# Patient Record
Sex: FEMALE | Race: WHITE | HISPANIC OR LATINO | Employment: OTHER | URBAN - METROPOLITAN AREA
[De-identification: names, ages, dates, MRNs, and addresses within clinical notes are randomized per-mention and may not be internally consistent; named-entity substitution may affect disease eponyms.]

---

## 2017-03-09 DIAGNOSIS — M54.16 BILATERAL LUMBAR RADICULOPATHY: ICD-10-CM

## 2017-03-09 DIAGNOSIS — G62.9 NEUROPATHY: ICD-10-CM

## 2017-03-09 RX ORDER — GABAPENTIN 300 MG/1
CAPSULE ORAL
Qty: 270 CAPSULE | Refills: 0 | Status: SHIPPED | OUTPATIENT
Start: 2017-03-09 | End: 2017-06-12 | Stop reason: SDUPTHER

## 2017-03-17 DIAGNOSIS — M75.110 INCOMPLETE TEAR OF ROTATOR CUFF, UNSPECIFIED LATERALITY: ICD-10-CM

## 2017-03-17 RX ORDER — DICLOFENAC SODIUM 10 MG/G
GEL TOPICAL
Qty: 300 G | Refills: 0 | Status: SHIPPED | OUTPATIENT
Start: 2017-03-17 | End: 2017-06-12 | Stop reason: SDUPTHER

## 2017-03-22 ENCOUNTER — TELEPHONE (OUTPATIENT)
Dept: NEUROLOGY | Facility: CLINIC | Age: 62
End: 2017-03-22

## 2017-04-13 ENCOUNTER — TELEPHONE (OUTPATIENT)
Dept: NEUROLOGY | Facility: CLINIC | Age: 62
End: 2017-04-13

## 2017-04-13 NOTE — TELEPHONE ENCOUNTER
Pt was incorrectly scheduled with Dr. Orourke. She is a patient of Dr. West. Left detailed message with new appointment time and date with Dr. West. Letter mailed.     Asked for a call back if time does not work

## 2017-04-13 NOTE — TELEPHONE ENCOUNTER
----- Message from Marino Orourke MD sent at 4/13/2017 12:27 PM CDT -----  This is a patient of Dr. West who has been scheduled with me at 1pm on 4/19.    Please reschedule her with Dr. West.  W[place], I have blocked my 1-1:30 for a phone conference.  Thank you.

## 2017-04-13 NOTE — TELEPHONE ENCOUNTER
Ms. Choi has been rescheduled with Dr. West and a letter sent with new time. Detailed vmail left for the patient.

## 2017-05-01 ENCOUNTER — TELEPHONE (OUTPATIENT)
Dept: NEUROLOGY | Facility: CLINIC | Age: 62
End: 2017-05-01

## 2017-05-01 DIAGNOSIS — H81.90 VESTIBULAR DISORDER, UNSPECIFIED LATERALITY: Primary | ICD-10-CM

## 2017-05-01 DIAGNOSIS — M54.16 BILATERAL LUMBAR RADICULOPATHY: ICD-10-CM

## 2017-05-01 NOTE — TELEPHONE ENCOUNTER
----- Message from Sonya Barraza sent at 4/28/2017  4:02 PM CDT -----  Contact: pt  _x  1st Request  _  2nd Request  _  3rd Request      Who:pt    Why: pt calling in regards to a referral for Therapy     What Number to Call Back: [phone]    When to Expect a call back: (Before the end of the day)   -- if call after 3:00 call back will be tomorrow.

## 2017-05-01 NOTE — TELEPHONE ENCOUNTER
Spoke to patient and she informed me that she needs an order for therapy. She said that she received one a while back but she never went. Patient also mentioned that Dr. West recommended a good facility in [place] because it is close to her home. Informed patient that Dr. West is out the office today and that I will forward the message over to him.

## 2017-05-02 ENCOUNTER — TELEPHONE (OUTPATIENT)
Dept: NEUROLOGY | Facility: CLINIC | Age: 62
End: 2017-05-02

## 2017-05-02 DIAGNOSIS — M54.16 BILATERAL LUMBAR RADICULOPATHY: ICD-10-CM

## 2017-05-02 DIAGNOSIS — H81.90 VESTIBULAR DISORDER, UNSPECIFIED LATERALITY: Primary | ICD-10-CM

## 2017-05-02 NOTE — TELEPHONE ENCOUNTER
For Vestibular Rehab (for dizziness), I only trust the [facility], because I have sent folks out into the community in [place] before and have had less than impressive results. If she can find a PT that does Vestibular Rehab, then she can get me the fax number and web site to vet the PT and then send orders in, otherwise I insist on quality.

## 2017-05-03 ENCOUNTER — TELEPHONE (OUTPATIENT)
Dept: NEUROLOGY | Facility: CLINIC | Age: 62
End: 2017-05-03

## 2017-05-03 NOTE — TELEPHONE ENCOUNTER
Ms. Choi states after 3 weeks at [facility] for vestibular PT, they told her they couldn't help her further. That is why she is questioning going through [facility] again for the samr thing    She states she is sure Dr. West mentioned a non [facility] facility on [address] [facility]t was very good for vestibular therapy.

## 2017-05-03 NOTE — TELEPHONE ENCOUNTER
----- Message from Claudia Short sent at 5/3/2017  9:19 AM CDT -----  Contact: Geovani Ferguson    Pt states she missed a call from you on yesterday and is returning that call    Contact number [phone]  Thanks

## 2017-05-08 ENCOUNTER — OFFICE VISIT (OUTPATIENT)
Dept: NEUROLOGY | Facility: CLINIC | Age: 62
End: 2017-05-08
Attending: PSYCHIATRY & NEUROLOGY
Payer: MEDICARE

## 2017-05-08 VITALS
HEIGHT: 61 IN | DIASTOLIC BLOOD PRESSURE: 88 MMHG | WEIGHT: 150.13 LBS | HEART RATE: 84 BPM | SYSTOLIC BLOOD PRESSURE: 158 MMHG | BODY MASS INDEX: 28.35 KG/M2

## 2017-05-08 DIAGNOSIS — I10 ESSENTIAL HYPERTENSION: ICD-10-CM

## 2017-05-08 DIAGNOSIS — H81.90 VESTIBULAR DISORDER, UNSPECIFIED LATERALITY: ICD-10-CM

## 2017-05-08 DIAGNOSIS — G45.9 TRANSIENT CEREBRAL ISCHEMIA, UNSPECIFIED TYPE: ICD-10-CM

## 2017-05-08 DIAGNOSIS — M54.12 CERVICAL RADICULOPATHY: Primary | ICD-10-CM

## 2017-05-08 PROCEDURE — 99214 OFFICE O/P EST MOD 30 MIN: CPT | Mod: S$GLB,,, | Performed by: PSYCHIATRY & NEUROLOGY

## 2017-05-08 PROCEDURE — 3079F DIAST BP 80-89 MM HG: CPT | Mod: S$GLB,,, | Performed by: PSYCHIATRY & NEUROLOGY

## 2017-05-08 PROCEDURE — 3077F SYST BP >= 140 MM HG: CPT | Mod: S$GLB,,, | Performed by: PSYCHIATRY & NEUROLOGY

## 2017-05-08 PROCEDURE — 99999 PR PBB SHADOW E&M-EST. PATIENT-LVL III: CPT | Mod: PBBFAC,,, | Performed by: PSYCHIATRY & NEUROLOGY

## 2017-05-08 PROCEDURE — 1160F RVW MEDS BY RX/DR IN RCRD: CPT | Mod: S$GLB,,, | Performed by: PSYCHIATRY & NEUROLOGY

## 2017-05-08 RX ORDER — LEVOTHYROXINE SODIUM 150 MCG
TABLET ORAL
Refills: 1 | COMMUNITY
Start: 2017-03-09 | End: 2017-06-19 | Stop reason: SDUPTHER

## 2017-05-08 NOTE — PROGRESS NOTES
Subjective:       Patient ID: Mary Choi is a 61 y.o. female.    Reason for Consult: Dizziness      Interval History:  Mary Choi is here for follow up. Their condition had markedly improved.  She notes that she still having episodes of transient dizziness that last 8-10 minutes.  She notes she has had at least 2 or 3 episodes of dizziness since her last visit.  She notes that she has significant alteration of awareness during these paroxysmal events.  She notes that there is no focal neurologic deficit except for the fact that she feels as if she has withdrawn into herself and feels generally altered without focal neurologic problems.     Objective:     Vitals:    05/08/17 0950   BP: (!) 158/88   Pulse: 84     Patient is awake alert oriented to person place and time.  Cranial nerves II through XII without focal deficit.  Strength is 5 out of 5 in all 4 extremities.  Gait and station within normal limits.  Focused examination was undertaken today. Over 50% of face to face time of 25 minute visit time was in giving guidance, counseling and discussing treatment options.    Results for orders placed or performed during the hospital encounter of 12/06/16   MRI Brain W WO Contrast    Narrative    MRI OF THE BRAIN WITHOUT AND WITH IV CONTRAST:     Technique: Multiplanar, multisequence MR images of the brain were obtained before and after the administration of 7 cc gadolinium-based IV contrast. 3-D time-of-flight MRA images of the brain were also obtained.    Comparison: MRI brain 4/29/2014       Findings:    MRI brain:  There is no restricted diffusion to suggest acute infarction. There is redemonstration of multiple foci of T2/FLAIR signal hyperintensity within the supratentorial white matter, the overall extent and distribution of which appear stable from prior MRI examination of 4/29/2014. There is no corresponding restricted diffusion or post contrast enhancement within these regions of flair signal  hyperintensity. Findings likely represent sequela of chronic microvascular ischemic change. There is no evidence of acute intracranial hemorrhage, hydrocephalus, midline shift or mass effect. No pathologic foci of enhancement are identified on postcontrast images.    The craniocervical junction and sellar region are unremarkable. Major skull base base T2 flow-voids are present. Visualized globes and orbits as well as the paranasal sinuses are unremarkable.    MRA brain:  The distal cervical, petrous, cavernous and supraclinoid segments of the ICAs are patent bilaterally. The ACAs and MCAs are patent.    The distal right vertebral artery is dominant. The left vertebral artery is hypoplastic, essentially ending in PICA. The basilar artery is patent. 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 There is a small focal outpouching which appears to be contiguous with the left superior cerebellar artery, likely representing small infundibulum. There is slight narrowing of the pre-pontine segment of the left superior cerebellar artery.    Impression    1. No evidence of acute intracranial abnormality, specifically no evidence of acute infarct.    2. Relatively stable appearing foci of T2/FLAIR signal hyperintensity in the supratentorial white matter, which is nonspecific but likely represent sequela of chronic microvascular ischemic change.    3. Diminutive caliber of the basilar artery, likely secondary to essentially persistent bilateral fetal configuration of the PCAs. Small left superior cerebellar artery infundibulum. Nonspecific slight narrowing of the left pre-pontine segment of the left superior cerebellar artery.        Electronically signed by: BRENDAN BARRY  Date:     12/07/16  Time:    05:26    Results for orders placed or performed during the hospital encounter of 12/06/16   MRA Brain without contrast     Narrative    MRI OF THE BRAIN WITHOUT AND WITH IV CONTRAST:     Technique: Multiplanar, multisequence MR images of the brain were obtained before and after the administration of 7 cc gadolinium-based IV contrast. 3-D time-of-flight MRA images of the brain were also obtained.    Comparison: MRI brain 4/29/2014       Findings:    MRI brain:  There is no restricted diffusion to suggest acute infarction. There is redemonstration of multiple foci of T2/FLAIR signal hyperintensity within the supratentorial white matter, the overall extent and distribution of which appear stable from prior MRI examination of 4/29/2014. There is no corresponding restricted diffusion or post contrast enhancement within these regions of flair signal hyperintensity. Findings likely represent sequela of chronic microvascular ischemic change. There is no evidence of acute intracranial hemorrhage, hydrocephalus, midline shift or mass effect. No pathologic foci of enhancement are identified on postcontrast images.    The craniocervical junction and sellar region are unremarkable. Major skull base base T2 flow-voids are present. Visualized globes and orbits as well as the paranasal sinuses are unremarkable.    MRA brain:  The distal cervical, petrous, cavernous and supraclinoid segments of the ICAs are patent bilaterally. The ACAs and MCAs are patent.    The distal right vertebral artery is dominant. The left vertebral artery is hypoplastic, essentially ending in PICA. The basilar artery is patent. 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 There is a small focal outpouching which appears to be contiguous with the left superior cerebellar artery, likely representing small infundibulum. There is slight narrowing of the pre-pontine segment of the left superior cerebellar artery.    Impression    1. No evidence of acute intracranial  abnormality, specifically no evidence of acute infarct.    2. Relatively stable appearing foci of T2/FLAIR signal hyperintensity in the supratentorial white matter, which is nonspecific but likely represent sequela of chronic microvascular ischemic change.    3. Diminutive caliber of the basilar artery, likely secondary to essentially persistent bilateral fetal configuration of the PCAs. Small left superior cerebellar artery infundibulum. Nonspecific slight narrowing of the left pre-pontine segment of the left superior cerebellar artery.        Electronically signed by: BRENDAN BARRY  Date:     12/07/16  Time:    05:26        Assessment/Plan:     Problem List Items Addressed This Visit        Neuro    TIA (transient ischemic attack)    Current Assessment & Plan     Concern for dizziness   MRI/MRA showed white matter disease   Will order Carotid and EEG studies for further eval of dizziness concerning for intracranial issues         Relevant Orders    Radiology US Carotid Bilateral    EEG,w/awake & asleep record    Vestibular disorder    Current Assessment & Plan     Will send to [facility] for vestibular disorder and cervical spine issues         Relevant Orders    Ambulatory Referral to Physical/Occupational Therapy       Cardiac    Essential hypertension    Overview     Will refer back to PCP for BP control.            Other Visit Diagnoses     Cervical radiculopathy    -  Primary    Relevant Orders    Ambulatory Referral to Physical/Occupational Therapy        61-year-old female presents for evaluation and follow-up of dizziness.  I have again counseled her extensively related to my concern that these are in fact cerebrovascular events.  We had previously checked for MRI and MRA changes and hasn't found significant white matter changes and counseled her regarding hypertension as a stroke risk factor.  She is again hypertensive on today's visit.  We have discussed this extensively today and I will refer her primary  care doctor for further discussion and evaluation of adjusting her medications for her hypertension.  At this time as she is losing awareness but not consciousness with these paroxysmal events of dizziness I will obtain carotid ultrasound as well as EEG.  She had not previously engaged in physical therapy for her vestibular condition or for her cervical radiculopathy.  I will resend the order for rehabilitation.  She notes that she will possibly be unable to pay for these diagnostic tests as well as as other diagnostic tests that have been ordered by her primary care doctor.  I will follow up with them in 1.5 month(s).  The patient verbalizes understanding and agreement with the treatment plan. I have discussed risks, benefits and alternatives to the treatment plan. Questions were sought and answered to her stated verbal satisfaction.        Dillon West MD    This note is dictated on Dragon Natural Speaking word recognition program. There are word recognition mistakes that are occasionally missed on review.

## 2017-05-08 NOTE — ASSESSMENT & PLAN NOTE
Concern for dizziness   MRI/MRA showed white matter disease   Will order Carotid and EEG studies for further eval of dizziness concerning for intracranial issues

## 2017-05-10 ENCOUNTER — HOSPITAL ENCOUNTER (OUTPATIENT)
Dept: RADIOLOGY | Facility: HOSPITAL | Age: 62
Discharge: HOME | End: 2017-05-10
Attending: PSYCHIATRY & NEUROLOGY
Payer: MEDICARE

## 2017-05-10 ENCOUNTER — HOSPITAL ENCOUNTER (OUTPATIENT)
Dept: NEUROLOGY | Facility: CLINIC | Age: 62
Discharge: HOME | End: 2017-05-10
Payer: MEDICARE

## 2017-05-10 DIAGNOSIS — G45.9 TRANSIENT CEREBRAL ISCHEMIA, UNSPECIFIED TYPE: ICD-10-CM

## 2017-05-10 PROCEDURE — 95816 PR EEG,W/AWAKE & DROWSY RECORD: ICD-10-PCS | Mod: S$GLB,,, | Performed by: PSYCHIATRY & NEUROLOGY

## 2017-05-10 PROCEDURE — 93880 EXTRACRANIAL BILAT STUDY: CPT | Mod: 26,,, | Performed by: RADIOLOGY

## 2017-05-10 PROCEDURE — 95816 EEG AWAKE AND DROWSY: CPT | Mod: S$GLB,,, | Performed by: PSYCHIATRY & NEUROLOGY

## 2017-05-10 PROCEDURE — 93880 EXTRACRANIAL BILAT STUDY: CPT | Mod: TC

## 2017-05-15 NOTE — PROCEDURES
Date of service  5/10/2017    Introduction  Electroencephalographic (EEG) recording is performed with electrodes placed according to the International 10-20 placement system. Thirty two (32) channels of digital signal (sampling rate of 512/sec) including T1 and T2 was simultaneously recorded from the scalp and may include EKG, EMG, and/or eye monitors. Recording band pass was 0.1 to 512 Hz. Digital video recording of the patient is simultaneously recorded with the EEG. The patient is instructed to report clinical symptoms which may occur during the recording session. EEG and video recording is stored and archived in digital format. Activation procedures which include photic stimulation, hyperventilation and instructing patients to perform simple tasks are done in selected patients.    The EEG is displayed on a monitor screen and can be reviewed using different montages. Computer assisted analysis is employed to detect spike and electrographic seizure activity. The entire record is submitted for computer analysis. The entire recording is visually reviewed and, the times identified by computer analysis as being spikes or seizures are reviewed again.     Compressed spectral analysis (CSA) is also performed on the activity recorded from each individual channel. This is displayed as a power display of frequencies from 0 to 30 Hz over time. The CSA is reviewed looking for asymmetries in power between homologous areas of the scalp and then compared with the original EEG recording.     Clinical History  The patient is a 61 y.o. female who presents with recurrent spells.  EEG was obtained to evaluate for evidence of seizures.    Findings  The patient's background consists of a posteriorly dominant 10 Hz alpha rhythm, which is well formed, well modulated, and abolishes with eye opening.  Anteriorly, the patient's background consists of predominantly alpha to beta range frequencies.    During the course of the recording, the  patient is noted to be awake, and subsequently becomes drowsy.  Normal sleep architecture is not appreciated.    Hyperventilation and photic stimulation were not performed.    There is no focal slowing.  There are no focal, lateralized, or epileptiform transients.  No electrographic seizures are seen.    EKG demonstrates sinus rhythm/sinus bradycardia.    Interpretation  This is a normal EEG in an awake and drowsy adult.  Please be aware that a normal EEG does not exclude the possibility of an underlying seizure disorder.

## 2017-06-12 DIAGNOSIS — M75.110 INCOMPLETE TEAR OF ROTATOR CUFF, UNSPECIFIED LATERALITY: ICD-10-CM

## 2017-06-12 DIAGNOSIS — G62.9 NEUROPATHY: ICD-10-CM

## 2017-06-12 DIAGNOSIS — M54.16 BILATERAL LUMBAR RADICULOPATHY: ICD-10-CM

## 2017-06-12 RX ORDER — DICLOFENAC SODIUM 10 MG/G
GEL TOPICAL
Qty: 300 G | Refills: 0 | Status: SHIPPED | OUTPATIENT
Start: 2017-06-12 | End: 2017-09-29 | Stop reason: SDUPTHER

## 2017-06-12 RX ORDER — GABAPENTIN 300 MG/1
CAPSULE ORAL
Qty: 270 CAPSULE | Refills: 2 | Status: SHIPPED | OUTPATIENT
Start: 2017-06-12 | End: 2018-05-04 | Stop reason: SDUPTHER

## 2017-06-19 ENCOUNTER — OFFICE VISIT (OUTPATIENT)
Dept: NEUROLOGY | Facility: CLINIC | Age: 62
End: 2017-06-19
Payer: MEDICARE

## 2017-06-19 VITALS
HEART RATE: 86 BPM | SYSTOLIC BLOOD PRESSURE: 148 MMHG | BODY MASS INDEX: 28.01 KG/M2 | DIASTOLIC BLOOD PRESSURE: 92 MMHG | WEIGHT: 148.38 LBS | HEIGHT: 61 IN

## 2017-06-19 DIAGNOSIS — R26.89 IMBALANCE: ICD-10-CM

## 2017-06-19 DIAGNOSIS — G62.9 NEUROPATHY: ICD-10-CM

## 2017-06-19 DIAGNOSIS — H81.90 VESTIBULAR DISORDER, UNSPECIFIED LATERALITY: Primary | ICD-10-CM

## 2017-06-19 DIAGNOSIS — H93.11 TINNITUS, RIGHT: ICD-10-CM

## 2017-06-19 PROCEDURE — 99999 PR PBB SHADOW E&M-EST. PATIENT-LVL III: CPT | Mod: PBBFAC,,, | Performed by: PSYCHIATRY & NEUROLOGY

## 2017-06-19 PROCEDURE — 99214 OFFICE O/P EST MOD 30 MIN: CPT | Mod: S$GLB,,, | Performed by: PSYCHIATRY & NEUROLOGY

## 2017-06-19 RX ORDER — LEVOTHYROXINE SODIUM 150 UG/1
150 TABLET ORAL DAILY
COMMUNITY
Start: 2017-06-12 | End: 2018-03-02

## 2017-06-19 NOTE — PROGRESS NOTES
Subjective:       Patient ID: Mary Choi is a 61 y.o. female.    Reason for Consult: Dizziness      Interval History:  Mary Choi is here for follow up. Their condition  is clinically stable.  She notes that she is only had 2 or 3 episodes of dizziness.  We have previously completed the workup for neurologic reasons for dizziness.  Her MRI was negative for acute stroke.  Further vascular studies show no intra-or extracranial stenosis.  EEG was negative.  We have discussed her neuropathy again but do not believe that the neuropathy is causing her dizziness and imbalance.  It has been at least 6 months since her thyroid studies have been checked although her medication has been adjusted.  We have discussed reordering labs to evaluate for this.    Objective:     Vitals:    06/19/17 1354   BP: (!) 148/92   Pulse: 86     Patient is awake alert oriented person place and time.  Gait and station within normal limits.  Cranial nerves II through XII without focal deficits.  Strength is 5 out of 5 in all 4 extremities.  Again demonstrated is a stocking glove neuropathy worsen bilateral lower extremities with decrement to sensation or pinprick and light touch less than 70% of normal.  Focused examination was undertaken today. Over 50% of face to face time of 25 minute visit time was in giving guidance, counseling and discussing treatment options.    Assessment/Plan:     Problem List Items Addressed This Visit        Neuro    Vestibular disorder - Primary    Overview     Will see ENT next month   [facility] apparently doesn't take insurance  Told by [facility] could not help            Other    Imbalance    Overview     MRI/MRA, EEG, Carotids negative during workup so far  ENT in July           Other Visit Diagnoses     Tinnitus, right        Neuropathy        Relevant Orders    TSH    T3    THYROID PEROXIDASE ANTIBODY        61-year-old female presents for evaluation and follow-up of dizziness and neuropathy.  At this  time I've explained that the neurology workup for dizziness has been inconclusive.  The patient is already scheduled to see otolaryngology next month.  I have suggested she keep that appointment.  We had previously tried vestibular rehabilitation for her with [facility] she was told that they can no longer help her, this is per patient report..  For now I'll defer any further treatment or workup of her condition with her associated dizziness to the ear nose and throat doctor.  I will see her back afterwards to continue her treatment for her neuropathy.  I will obtain labs and we'll review them and her next visit with me.  I will follow up with them in 2 month(s).  The patient verbalizes understanding and agreement with the treatment plan. I have discussed risks, benefits and alternatives to the treatment plan. Questions were sought and answered to her stated verbal satisfaction.        Dillon West MD    This note is dictated on Dragon Natural Speaking word recognition program. There are word recognition mistakes that are occasionally missed on review.

## 2017-07-12 ENCOUNTER — OFFICE VISIT (OUTPATIENT)
Dept: OTOLARYNGOLOGY | Facility: CLINIC | Age: 62
End: 2017-07-12
Payer: MEDICARE

## 2017-07-12 ENCOUNTER — CLINICAL SUPPORT (OUTPATIENT)
Dept: AUDIOLOGY | Facility: CLINIC | Age: 62
End: 2017-07-12
Payer: MEDICARE

## 2017-07-12 VITALS
TEMPERATURE: 97 F | BODY MASS INDEX: 27.77 KG/M2 | SYSTOLIC BLOOD PRESSURE: 113 MMHG | HEIGHT: 61 IN | HEART RATE: 56 BPM | DIASTOLIC BLOOD PRESSURE: 68 MMHG | WEIGHT: 147.06 LBS

## 2017-07-12 DIAGNOSIS — H53.19 VISUAL DISTORTIONS: ICD-10-CM

## 2017-07-12 DIAGNOSIS — R26.89 IMBALANCE: Primary | ICD-10-CM

## 2017-07-12 DIAGNOSIS — Z82.0 FAMILY HISTORY OF MIGRAINE: ICD-10-CM

## 2017-07-12 DIAGNOSIS — R26.89 IMBALANCE: ICD-10-CM

## 2017-07-12 DIAGNOSIS — R42 DIZZINESS: Primary | ICD-10-CM

## 2017-07-12 PROCEDURE — 92557 COMPREHENSIVE HEARING TEST: CPT | Mod: S$GLB,,, | Performed by: AUDIOLOGIST

## 2017-07-12 PROCEDURE — 99203 OFFICE O/P NEW LOW 30 MIN: CPT | Mod: S$GLB,,, | Performed by: OTOLARYNGOLOGY

## 2017-07-12 PROCEDURE — 99999 PR PBB SHADOW E&M-EST. PATIENT-LVL III: CPT | Mod: PBBFAC,,, | Performed by: OTOLARYNGOLOGY

## 2017-07-12 PROCEDURE — 92550 TYMPANOMETRY & REFLEX THRESH: CPT | Mod: S$GLB,,, | Performed by: AUDIOLOGIST

## 2017-07-12 NOTE — PATIENT INSTRUCTIONS
Audiometry WNL; note upward deflection with AD A.R.  Pt. encouraged to schedule for complete VNG re: dizziness sx; vertigo work-up literature provided  To ED for significant neurological changes/sx  Call for any sudden change in hearing; stat audiogram prn

## 2017-07-12 NOTE — LETTER
July 13, 2017      Shiva West III, MD  [address]  [address]  [facility] [address]           [facility] - Otorhinolaryngology  [address]  [facility] [address]  Phone: [phone]  Fax: [phone]          Patient: Mary Choi   MR Number: [id number]   YOB: 1955   Date of Visit: 7/12/2017       Dear Dr. Shiva West III:    Thank you for referring Mary Choi to me for evaluation. Attached you will find relevant portions of my assessment and plan of care.    If you have questions, please do not hesitate to call me. I look forward to following Mary Choi along with you.    Sincerely,    Branden Pate III, MD    Enclosure  CC:  No Recipients    If you would like to receive this communication electronically, please contact [email or web address] or [phone] to request more information on [employer] Link access.    For providers and/or their staff who would like to refer a patient to [facility], please contact us through our one-stop-shop provider referral line, [facility], at [phone].    If you feel you have received this communication in error or would no longer like to receive these types of communications, please e-mail [email or web address]

## 2017-07-12 NOTE — PROGRESS NOTES
Subjective:       Patient ID: Mary Choi is a 61 y.o. female.    Chief Complaint: No chief complaint on file.    HPI: Ms. Choi is a 61-year-old female who takes Requip at night for restless leg syndrome.  She speaks with a [place] accent.    She was referred here by  her neurologist with regard to imbalance symptoms.  His EMR notes of 6/19/17 indicates the patient's try 3 episodes of dizziness.  Her MRI was negative for acute stroke.  EEG was negative.  He does not believe that the neuropathy was causing her dizziness and imbalance symptoms.  She completed an MRI scan of the brain with and without contrast in December 2016 which indicated no evidence of acute intracranial abnormality nor infarction.  There is relatively stable appearance of the signal hyperintensity in the supratentorial white matter.  There was diminutive caliber of the basilar artery likely secondary to essential persistent bilateral fetal configuration of the PCAs.  There was a small left superior cerebellar artery infundibulum.  There was nonspecific slight narrowing of the left prepontine segment of the left superior cerebellar artery.    She was walking her dog the other day and she had a dizzy spell which occurred spontaneously.  She could not stand up right had to walk drifting to the left side for 5 minutes.  She admits to being scared and losing control of her body in this manner.  She had no episode like this one before.    She sought help from  [facility] ENT physician last year ; she had complained of a plugged up ear last year.  She fell like water was in the ear transiently then.    An audiometric study was within normal limits then.    She indicates symptoms of distorted vision and flashing lights during some of her dizzy spells.  She indicates loss of balance symptoms.  She denies a correlation of dizzy spells with headaches.   Her differential diagnosis includes vertigo, migraine and TIAs.   "Her mother suffers with migraine headaches treated with "sinus pills".  Her diagnoses remains a mystery to her and her physicians at this point.  She indicates a feeling as if she is in a "different dimension".  I feel I I have "lost it".    She indicates a significant fall/head injury  in 2001 when she fell off and exam table and hit her head.  She had double vision afterwards and dizziness.   All her symptoms have been subsequent to this traumatic episode..    ALLERGIES: Ativan, duloxetine  PMH: High blood pressure, thyroid disease, arthritis, osteoporosis, vertigo, probable gastric ulcer, fibromyalgia diagnosis, visual and balance issues, severe leg spasms  PSH: Thyroidectomy 2009, subtotal gastrectomy 2002  Family history: High blood pressure, stroke, migraine, arthritis  Habits: Past history of tobacco use 1 pack per day ( patient quit smoking 12 years ago); 1-2 drinks per day and occasionally; 1 cup of coffee per day  Occupation: Disabled professional  Review of Systems   Ears: Positive for ear pressure, ringing in ear and dizziness.    Nose:  Positive for postnasal drip.    Eyes:  Positive for visual change.   Cardiovascular:  Positive for chest pain and history of high blood pressure.   Gastrointestinal:  Positive for history of stomach ulcers or pain and acid reflux.   Other:  Positive for arthritis, weakness, disturbances in coordination, slurred, depression and anxiety. Negative for rash.     Her medical problem list includes transient cerebral ischemia, depression, rotator cuff tendinitis, antiphospholipid antibody positivity, fatigue, myalgia and myositis, migraine equivalent, bilateral knee pain, leg weakness, fibromyalgia, paresthesias, vitamin D deficiency, vestibular disorder, essential hypertension and imbalance.      Objective:     Blood pressure 113/68 pulse 56 height 5 feet 1 inch weight 147 pounds temperature 97.4  Gen.: Alert and oriented lady in no acute distress  Physical Exam "   Constitutional: She is oriented to person, place, and time. She appears well-developed and well-nourished.   HENT:   Head: Normocephalic.   Right Ear: Tympanic membrane and external ear normal. No drainage. No foreign bodies. No mastoid tenderness. Tympanic membrane is not perforated. No decreased hearing is noted.   Left Ear: Tympanic membrane and external ear normal. No drainage. No foreign bodies. No mastoid tenderness. Tympanic membrane is not perforated. No decreased hearing is noted.   Ears:    Nose: Nose normal. No nasal deformity, septal deviation or nasal septal hematoma. No epistaxis. Right sinus exhibits no maxillary sinus tenderness and no frontal sinus tenderness. Left sinus exhibits no maxillary sinus tenderness and no frontal sinus tenderness.   Mouth/Throat: Uvula is midline, oropharynx is clear and moist and mucous membranes are normal. No oral lesions. No trismus in the jaw. No uvula swelling. No oropharyngeal exudate or tonsillar abscesses.       Neck: Neck supple. No tracheal deviation present. No thyromegaly present.   Pulmonary/Chest: Effort normal. No stridor.   Lymphadenopathy:     She has no cervical adenopathy.   Neurological: She is alert and oriented to person, place, and time. She displays weakness.   Skin: No rash noted.   Psychiatric: Her speech is slurred.       Assessment:       1. Dizziness ; 2-3 significant episodes   2. Imbalance    3. Family history of migraine    4. Visual distortions        Plan:     Audiometry WNL; note upward deflection with AD A.R.  Pt. encouraged to schedule for complete VNG re: dizziness sx; vertigo work-up literature provided  To ED for significant neurological changes/sx  Call for any sudden change in hearing; stat audiogram prn

## 2017-07-19 ENCOUNTER — TELEPHONE (OUTPATIENT)
Dept: SPINE | Facility: CLINIC | Age: 62
End: 2017-07-19

## 2017-07-19 DIAGNOSIS — M54.50 LOW BACK PAIN WITHOUT SCIATICA, UNSPECIFIED BACK PAIN LATERALITY, UNSPECIFIED CHRONICITY: Primary | ICD-10-CM

## 2017-07-20 ENCOUNTER — HOSPITAL ENCOUNTER (OUTPATIENT)
Dept: RADIOLOGY | Facility: OTHER | Age: 62
Discharge: HOME | End: 2017-07-20
Attending: NEUROLOGICAL SURGERY
Payer: MEDICARE

## 2017-07-20 ENCOUNTER — OFFICE VISIT (OUTPATIENT)
Dept: SPINE | Facility: CLINIC | Age: 62
End: 2017-07-20
Payer: MEDICARE

## 2017-07-20 VITALS
BODY MASS INDEX: 27.75 KG/M2 | SYSTOLIC BLOOD PRESSURE: 136 MMHG | WEIGHT: 147 LBS | HEART RATE: 68 BPM | DIASTOLIC BLOOD PRESSURE: 66 MMHG | HEIGHT: 61 IN

## 2017-07-20 DIAGNOSIS — M51.37 DDD (DEGENERATIVE DISC DISEASE), LUMBOSACRAL: ICD-10-CM

## 2017-07-20 DIAGNOSIS — M54.50 ACUTE BILATERAL LOW BACK PAIN WITHOUT SCIATICA: ICD-10-CM

## 2017-07-20 DIAGNOSIS — M54.50 LOW BACK PAIN WITHOUT SCIATICA, UNSPECIFIED BACK PAIN LATERALITY, UNSPECIFIED CHRONICITY: ICD-10-CM

## 2017-07-20 DIAGNOSIS — M47.819 SPONDYLOSIS WITHOUT MYELOPATHY: ICD-10-CM

## 2017-07-20 PROCEDURE — 72100 X-RAY EXAM L-S SPINE 2/3 VWS: CPT | Mod: 26,,, | Performed by: RADIOLOGY

## 2017-07-20 PROCEDURE — 72120 X-RAY BEND ONLY L-S SPINE: CPT | Mod: 26,,, | Performed by: RADIOLOGY

## 2017-07-20 PROCEDURE — 99214 OFFICE O/P EST MOD 30 MIN: CPT | Mod: S$GLB,,, | Performed by: PHYSICIAN ASSISTANT

## 2017-07-20 PROCEDURE — 99999 PR PBB SHADOW E&M-EST. PATIENT-LVL IV: CPT | Mod: PBBFAC,,, | Performed by: PHYSICIAN ASSISTANT

## 2017-07-20 RX ORDER — METHYLPREDNISOLONE 4 MG/1
TABLET ORAL
Qty: 1 PACKAGE | Refills: 0 | Status: SHIPPED | OUTPATIENT
Start: 2017-07-20 | End: 2017-08-10

## 2017-07-20 NOTE — PROGRESS NOTES
"Subjective:     Patient ID:  Mary Choi is a 61 y.o. female.      Chief Complaint: Low back pain    HPI    Mary Choi is a 61 y.o. female who presents for follow up.  Since I saw her last year she would have back pain that would come and go.  About 2 weeks ago the pain got worse and then much worse about one week ago.  It has eased up since yesterday some.  Pain is across the low back that is constant and worse with reaching for something and better with sitting in a flexed position.  Some pain in the left hip and upper thigh region and this is described as an aching pain.  No shooting leg pain.    She is taking gabapentin for bilateral leg cramping that comes and goes.    Pain rated 8/10 today.    No PT.  No ESIs.  No spine surgery.  Patient is taking baclofen, gabapentin, zanaflex, and diclofenac.    Patient denies any recent accidents or trauma, no saddle anesthesias, and no bowel or bladder incontinence.      Review of Systems:  Constitution: Negative for chills, fever, night sweats and weight loss.   Musculoskeletal: Negative for falls.   Gastrointestinal: Negative for bowel incontinence, nausea and vomiting.   Genitourinary: Negative for bladder incontinence.   Neurological: Negative for disturbances in coordination and loss of balance.     Objective:      Vitals:    07/20/17 1044   BP: 136/66   Pulse: 68   Weight: 66.7 kg (147 lb)   Height: 5' 1" (1.549 m)   PainSc:   3   PainLoc: Back         Physical Exam:    General:  Mary Choi is well-developed, well-nourished, appears stated age, in no acute distress, alert and oriented to person, place, and time.    Pulmonary/Chest:  Respiratory effort normal  Abdominal: Exhibits no distension  Psychiatric:  Normal mood and affect.  Behavior is normal.  Judgement and thought content normal    Musculoskeletal:    Patient arises from a sitting to standing position without difficulty.  Patient walks to the door without evidence of limp, pain, or abnormality " of gait. Patient is able to walk on heels and toes without difficulty.    Lumbar ROM:   Pain in lumbar flexion and left lateral bending.  No pain in extension and right lateral bending.    Lumbar Spine Inspection:  Normal with no surgical scars with no visible rashes.    Lumbar Spine Palpation:  Mild tenderness to low back palpation.    SI Joint Palpation:  No tenderness to right SI Joint palpation.  Mild tenderness to left SI joint palpation.    Straight Leg Raise:  Negative right and left SLR.    Neurological:  Alert and oriented to person, place, and time    Muscle strength against resistance:     Right Left   Hip flexion  5 / 5 5 / 5   Hip extension 5 / 5 5 / 5   Hip abduction 5 / 5 5 / 5   Hip adduction  5 / 5 5 / 5   Knee extension  5 / 5 5 / 5   Knee flexion 5 / 5 5 / 5   Dorsiflexion  5 / 5 5 / 5   EHL  5 / 5 5 / 5   Plantar flexion  5 / 5 5 / 5   Inversion of the feet 5 / 5 5 / 5   Eversion of the feet  5 / 5 5 / 5     Reflexes:     Right Left   Patellar 1+ 2+   Achilles 2+ 2+     Clonus:  Negative bilaterally    On gross examination of the bilateral upper extremities, patient has full painfree ROM with no signs of clubbing, cyanosis, edema, or weakness.     XRAY Interpretation:     Lumbar spine ap/lateral/flexion/extension xrays were personally reviewed today.  No fractures.  No movement on flexion and extension.  DDD at L5-S1.    Assessment:          1. Spondylosis without myelopathy    2. DDD (degenerative disc disease), lumbosacral    3. Acute bilateral low back pain without sciatica             Plan:          Orders Placed This Encounter    Ambulatory referral to Physical Therapy - Lumbar    methylPREDNISolone (MEDROL, MAYNOR,) 4 mg tablet       L5-S1 DDD    -Medrol pack now with food  -PT through [facility]  -FU in three months and if symptoms get worse would need MRI lumbar spine updated.   Previous MRI showed small central HNP at L5-S1.      Follow-Up:  Return in 3 months (on 10/20/2017). If there are  any questions prior to this, the patient was instructed to contact the office.       NIURKA Bolaños, PA-C  Neurosurgery  [facility]  [facility]

## 2017-09-29 DIAGNOSIS — M75.110 INCOMPLETE TEAR OF ROTATOR CUFF, UNSPECIFIED LATERALITY: ICD-10-CM

## 2017-09-29 RX ORDER — DICLOFENAC SODIUM 10 MG/G
GEL TOPICAL
Qty: 300 G | Refills: 0 | Status: SHIPPED | OUTPATIENT
Start: 2017-09-29 | End: 2018-02-15 | Stop reason: SDUPTHER

## 2017-10-05 ENCOUNTER — CLINICAL SUPPORT (OUTPATIENT)
Dept: AUDIOLOGY | Facility: CLINIC | Age: 62
End: 2017-10-05
Payer: MEDICARE

## 2017-10-05 DIAGNOSIS — H81.8X9 OTHER DISORDERS OF VESTIBULAR FUNCTION, UNSPECIFIED EAR: Primary | ICD-10-CM

## 2017-10-05 PROCEDURE — 92540 BASIC VESTIBULAR EVALUATION: CPT | Mod: S$GLB,,, | Performed by: AUDIOLOGIST-HEARING AID FITTER

## 2017-10-05 PROCEDURE — 92537 CALORIC VSTBLR TEST W/REC: CPT | Mod: S$GLB,,, | Performed by: AUDIOLOGIST-HEARING AID FITTER

## 2017-10-05 NOTE — PROGRESS NOTES
VNG/Postuography Evaluation    Referring physician:  Dr. Pate    61 y.o. female complains of dizziness, double/blurred vision, lightheadedness and imbalance with several times of almost falling.  Symptoms occur spontaneously and have been recurring over the past year or so. Ms. Choi reported her episodes are brief, lasting seconds to several minutes. She stated that Dr. West (Neurology) recommended vestibular therapy but she never followed up with this.     Gaze nystagmus was absent.    Oculomotor function was normal.    Spontaneous nystagmus was absent.    The head-hanging left Hallpike was negative.    The head-hanging right Hallpike was negative.    Unilateral weakness: 15% ( right)  Directional preponderance 5% (left beating)    RC: 8 d/s   d/s  RW: 18 d/s  LW: 24 d/s    Fixation suppression was normal.    Impression: Normal VNG; no evidence of vestibular system dysfunction including BPPV.    Recommendations: Trial period with Cawthorne exercises. Ms. Choi was given a copy of these to try at home. May also consider formal vestibular rehabilitation.

## 2018-02-15 ENCOUNTER — HOSPITAL ENCOUNTER (OUTPATIENT)
Dept: RADIOLOGY | Facility: HOSPITAL | Age: 63
Discharge: HOME | End: 2018-02-15
Attending: NURSE PRACTITIONER
Payer: MEDICARE

## 2018-02-15 ENCOUNTER — OFFICE VISIT (OUTPATIENT)
Dept: ORTHOPEDICS | Facility: CLINIC | Age: 63
End: 2018-02-15
Payer: MEDICARE

## 2018-02-15 ENCOUNTER — TELEPHONE (OUTPATIENT)
Dept: ORTHOPEDICS | Facility: CLINIC | Age: 63
End: 2018-02-15

## 2018-02-15 VITALS — HEIGHT: 61 IN | WEIGHT: 155 LBS | BODY MASS INDEX: 29.27 KG/M2

## 2018-02-15 DIAGNOSIS — M25.552 LEFT HIP PAIN: Primary | ICD-10-CM

## 2018-02-15 DIAGNOSIS — M75.110 INCOMPLETE TEAR OF ROTATOR CUFF, UNSPECIFIED LATERALITY: ICD-10-CM

## 2018-02-15 DIAGNOSIS — M25.552 LEFT HIP PAIN: ICD-10-CM

## 2018-02-15 PROCEDURE — 73502 X-RAY EXAM HIP UNI 2-3 VIEWS: CPT | Mod: 26,LT,, | Performed by: RADIOLOGY

## 2018-02-15 PROCEDURE — 73502 X-RAY EXAM HIP UNI 2-3 VIEWS: CPT | Mod: TC,LT

## 2018-02-15 PROCEDURE — 99213 OFFICE O/P EST LOW 20 MIN: CPT | Mod: S$GLB,,, | Performed by: NURSE PRACTITIONER

## 2018-02-15 PROCEDURE — 3008F BODY MASS INDEX DOCD: CPT | Mod: S$GLB,,, | Performed by: NURSE PRACTITIONER

## 2018-02-15 PROCEDURE — 99999 PR PBB SHADOW E&M-EST. PATIENT-LVL III: CPT | Mod: PBBFAC,,, | Performed by: NURSE PRACTITIONER

## 2018-02-15 RX ORDER — DICLOFENAC SODIUM 10 MG/G
GEL TOPICAL
Qty: 300 G | Refills: 3 | Status: SHIPPED | OUTPATIENT
Start: 2018-02-15 | End: 2018-05-08 | Stop reason: SDUPTHER

## 2018-02-15 RX ORDER — METHYLPREDNISOLONE 4 MG/1
TABLET ORAL
Qty: 1 PACKAGE | Refills: 0 | Status: SHIPPED | OUTPATIENT
Start: 2018-02-15 | End: 2018-03-02

## 2018-02-18 NOTE — PROGRESS NOTES
"CC: Pain of the Left Hip      HPI: Pt with left hip pain for "months". The patient reports that the pain starts over the left buttock and radiates down to the ankle. The pain is burning and radiating. She admits to having "back problems" and sciatic nerve pain in the past. She has taken aleve without relief. She is ambulating without assistive device. There is not a limp.    ROS  General: denies fever and chills  Resp: no c/o sob  CVS: no c/o cp  MSK: c/o left hip pain which starts over the left buttock and radiates to the ankle. The pain is burning and radiates    PE  General: AAOx3, pleasant and cooperative  Resp: respirations even and unlabored  MSK: left hip exam  [facility]  - straight leg raise  - pain with internal rotation  - pain with external rotation  - pain over the greater trochanter    Xray:  Reviewed by me: No fracture dislocation bone destruction seen. There is mild DJD.    Assessment:  Sciatic nerve pain, left    Plan:  Discussed treatment options with the patient. Will try a medrol dose pack for now as she has several other upcoming appointments and doesn't feel that she has time for physical therapy at this time.   Nsaids prn  F/u with back and spine if no improvement     "

## 2018-03-02 ENCOUNTER — HOSPITAL ENCOUNTER (OUTPATIENT)
Dept: RADIOLOGY | Facility: HOSPITAL | Age: 63
Discharge: HOME | End: 2018-03-02
Attending: ORTHOPAEDIC SURGERY
Payer: MEDICARE

## 2018-03-02 ENCOUNTER — OFFICE VISIT (OUTPATIENT)
Dept: ORTHOPEDICS | Facility: CLINIC | Age: 63
End: 2018-03-02
Payer: MEDICARE

## 2018-03-02 VITALS — HEIGHT: 61 IN | BODY MASS INDEX: 28.94 KG/M2 | WEIGHT: 153.31 LBS

## 2018-03-02 DIAGNOSIS — M79.671 RIGHT FOOT PAIN: ICD-10-CM

## 2018-03-02 DIAGNOSIS — M19.079 ARTHRITIS OF FOOT: ICD-10-CM

## 2018-03-02 DIAGNOSIS — M79.671 RIGHT FOOT PAIN: Primary | ICD-10-CM

## 2018-03-02 PROCEDURE — 73630 X-RAY EXAM OF FOOT: CPT | Mod: 26,RT,, | Performed by: RADIOLOGY

## 2018-03-02 PROCEDURE — 99999 PR PBB SHADOW E&M-EST. PATIENT-LVL II: CPT | Mod: PBBFAC,,, | Performed by: ORTHOPAEDIC SURGERY

## 2018-03-02 PROCEDURE — 73630 X-RAY EXAM OF FOOT: CPT | Mod: TC,RT

## 2018-03-02 PROCEDURE — 99214 OFFICE O/P EST MOD 30 MIN: CPT | Mod: S$GLB,,, | Performed by: ORTHOPAEDIC SURGERY

## 2018-03-02 RX ORDER — LEVOTHYROXINE SODIUM 125 UG/1
TABLET ORAL
COMMUNITY
Start: 2018-02-09
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
## 2018-03-12 ENCOUNTER — OFFICE VISIT (OUTPATIENT)
Dept: ORTHOPEDICS | Facility: CLINIC | Age: 63
End: 2018-03-12
Payer: MEDICARE

## 2018-03-12 ENCOUNTER — HOSPITAL ENCOUNTER (OUTPATIENT)
Dept: RADIOLOGY | Facility: HOSPITAL | Age: 63
Discharge: HOME | End: 2018-03-12
Attending: ORTHOPAEDIC SURGERY
Payer: MEDICARE

## 2018-03-12 DIAGNOSIS — M75.40 ROTATOR CUFF IMPINGEMENT SYNDROME, UNSPECIFIED LATERALITY: ICD-10-CM

## 2018-03-12 DIAGNOSIS — M54.6 CHRONIC MIDLINE THORACIC BACK PAIN: ICD-10-CM

## 2018-03-12 DIAGNOSIS — M25.512 LEFT SHOULDER PAIN, UNSPECIFIED CHRONICITY: Primary | ICD-10-CM

## 2018-03-12 DIAGNOSIS — M25.512 LEFT SHOULDER PAIN, UNSPECIFIED CHRONICITY: ICD-10-CM

## 2018-03-12 DIAGNOSIS — G89.29 CHRONIC MIDLINE THORACIC BACK PAIN: ICD-10-CM

## 2018-03-12 PROCEDURE — 99999 PR PBB SHADOW E&M-EST. PATIENT-LVL III: CPT | Mod: PBBFAC,,, | Performed by: ORTHOPAEDIC SURGERY

## 2018-03-12 PROCEDURE — 99213 OFFICE O/P EST LOW 20 MIN: CPT | Mod: 25,S$GLB,, | Performed by: ORTHOPAEDIC SURGERY

## 2018-03-12 PROCEDURE — 73030 X-RAY EXAM OF SHOULDER: CPT | Mod: TC,FY,LT

## 2018-03-12 PROCEDURE — 20610 DRAIN/INJ JOINT/BURSA W/O US: CPT | Mod: LT,S$GLB,, | Performed by: ORTHOPAEDIC SURGERY

## 2018-03-12 PROCEDURE — 73030 X-RAY EXAM OF SHOULDER: CPT | Mod: 26,LT,, | Performed by: RADIOLOGY

## 2018-03-12 RX ORDER — TRIAMCINOLONE ACETONIDE 40 MG/ML
40 INJECTION, SUSPENSION INTRA-ARTICULAR; INTRAMUSCULAR
Status: COMPLETED | OUTPATIENT
Start: 2018-03-12 | End: 2018-03-12

## 2018-03-12 RX ORDER — TRAMADOL HYDROCHLORIDE 50 MG/1
50 TABLET ORAL EVERY 12 HOURS PRN
Qty: 40 TABLET | Refills: 0 | Status: SHIPPED | OUTPATIENT
Start: 2018-03-12 | End: 2018-03-22

## 2018-03-12 RX ADMIN — TRIAMCINOLONE ACETONIDE 40 MG: 40 INJECTION, SUSPENSION INTRA-ARTICULAR; INTRAMUSCULAR at 01:03

## 2018-03-12 NOTE — PROGRESS NOTES
HISTORY OF PRESENT ILLNESS:  Mrs. Choi seen previously for right shoulder   impingement, but now having problems in the opposite left shoulder.  Symptoms   have been present for about a month or two.  Symptoms are worse with lifting and   raising up the arm.  No recent trauma or injury is reported.  No numbness or   tingling reported.    PHYSICAL EXAMINATION:  LEFT SHOULDER:  No tenderness, no swelling.  Range of motion full, but she does   have a positive impingement sign with abduction, internal rotation left shoulder   causing pain.  Rotator cuff strength intact.  No instability.  NEUROLOGIC:  Normal.    X-RAYS:  AP and lateral, left shoulder, demonstrates some mild spurring at   anterolateral acromion and AC joint.    IMPRESSION:  Left shoulder impingement.    PLAN:  I recommended an injection.  After pause for timeout, the patient   identified the left shoulder injected with combination of Kenalog 40 mg, 2 mL   Xylocaine, sterile technique.    She tolerated the procedure well without complication.  Recommended ice,   anti-inflammatory medication by mouth and follow up in one month if symptoms   have not resolved.      RUFINA  dd: 03/12/2018 13:58:37 (CDT)  td: 03/13/2018 07:24:50 (CDT)  Doc ID   #[id number]  Job ID #[id number]    CC:

## 2018-03-27 ENCOUNTER — OFFICE VISIT (OUTPATIENT)
Dept: SPORTS MEDICINE | Facility: CLINIC | Age: 63
End: 2018-03-27
Payer: MEDICARE

## 2018-03-27 ENCOUNTER — HOSPITAL ENCOUNTER (OUTPATIENT)
Dept: RADIOLOGY | Facility: HOSPITAL | Age: 63
Discharge: HOME | End: 2018-03-27
Attending: ORTHOPAEDIC SURGERY
Payer: MEDICARE

## 2018-03-27 VITALS
BODY MASS INDEX: 28.89 KG/M2 | HEART RATE: 80 BPM | WEIGHT: 153 LBS | HEIGHT: 61 IN | SYSTOLIC BLOOD PRESSURE: 168 MMHG | DIASTOLIC BLOOD PRESSURE: 68 MMHG

## 2018-03-27 DIAGNOSIS — M25.561 PAIN IN BOTH KNEES, UNSPECIFIED CHRONICITY: Primary | ICD-10-CM

## 2018-03-27 DIAGNOSIS — M25.562 PAIN IN BOTH KNEES, UNSPECIFIED CHRONICITY: Primary | ICD-10-CM

## 2018-03-27 DIAGNOSIS — M17.11 OSTEOARTHRITIS OF RIGHT KNEE: ICD-10-CM

## 2018-03-27 DIAGNOSIS — M17.12 DEGENERATIVE ARTHRITIS OF LEFT KNEE: ICD-10-CM

## 2018-03-27 DIAGNOSIS — M25.562 PAIN IN BOTH KNEES, UNSPECIFIED CHRONICITY: ICD-10-CM

## 2018-03-27 DIAGNOSIS — M25.561 PAIN IN BOTH KNEES, UNSPECIFIED CHRONICITY: ICD-10-CM

## 2018-03-27 PROCEDURE — 3078F DIAST BP <80 MM HG: CPT | Mod: CPTII,S$GLB,, | Performed by: ORTHOPAEDIC SURGERY

## 2018-03-27 PROCEDURE — 73564 X-RAY EXAM KNEE 4 OR MORE: CPT | Mod: TC,50,FY,PO

## 2018-03-27 PROCEDURE — 73564 X-RAY EXAM KNEE 4 OR MORE: CPT | Mod: 26,RT,, | Performed by: RADIOLOGY

## 2018-03-27 PROCEDURE — 99214 OFFICE O/P EST MOD 30 MIN: CPT | Mod: S$GLB,,, | Performed by: ORTHOPAEDIC SURGERY

## 2018-03-27 PROCEDURE — 99999 PR PBB SHADOW E&M-EST. PATIENT-LVL III: CPT | Mod: PBBFAC,,, | Performed by: ORTHOPAEDIC SURGERY

## 2018-03-27 PROCEDURE — 73564 X-RAY EXAM KNEE 4 OR MORE: CPT | Mod: 26,LT,, | Performed by: RADIOLOGY

## 2018-03-27 PROCEDURE — 3077F SYST BP >= 140 MM HG: CPT | Mod: CPTII,S$GLB,, | Performed by: ORTHOPAEDIC SURGERY

## 2018-04-04 ENCOUNTER — TELEPHONE (OUTPATIENT)
Dept: INTERNAL MEDICINE | Facility: CLINIC | Age: 63
End: 2018-04-04

## 2018-04-04 DIAGNOSIS — Z12.39 SCREENING FOR BREAST CANCER: Primary | ICD-10-CM

## 2018-04-24 ENCOUNTER — OFFICE VISIT (OUTPATIENT)
Dept: SPORTS MEDICINE | Facility: CLINIC | Age: 63
End: 2018-04-24
Payer: MEDICARE

## 2018-04-24 VITALS
HEART RATE: 86 BPM | WEIGHT: 153 LBS | HEIGHT: 61 IN | DIASTOLIC BLOOD PRESSURE: 59 MMHG | SYSTOLIC BLOOD PRESSURE: 115 MMHG | BODY MASS INDEX: 28.89 KG/M2

## 2018-04-24 DIAGNOSIS — M17.11 OSTEOARTHRITIS OF RIGHT KNEE: Primary | ICD-10-CM

## 2018-04-24 DIAGNOSIS — M17.12 DEGENERATIVE ARTHRITIS OF LEFT KNEE: ICD-10-CM

## 2018-04-24 PROCEDURE — 99999 PR PBB SHADOW E&M-EST. PATIENT-LVL III: CPT | Mod: PBBFAC,,, | Performed by: ORTHOPAEDIC SURGERY

## 2018-04-24 PROCEDURE — 20610 DRAIN/INJ JOINT/BURSA W/O US: CPT | Mod: 50,S$GLB,, | Performed by: ORTHOPAEDIC SURGERY

## 2018-04-24 PROCEDURE — 99499 UNLISTED E&M SERVICE: CPT | Mod: S$GLB,,, | Performed by: ORTHOPAEDIC SURGERY

## 2018-04-24 NOTE — PROCEDURES
Large Joint Aspiration/Injection  Date/Time: 4/24/2018 4:01 PM  Performed by: MARY MICHELE  Authorized by: MARY MICHELE     Consent Done?:  Yes (Verbal)  Indications:  Pain  Procedure site marked: Yes    Timeout: Prior to procedure the correct patient, procedure, and site was verified      Location:  Knee  Site:  L knee and R knee  Prep: Patient was prepped and draped in usual sterile fashion    Ultrasonic Guidance for needle placement: No  Needle size:  22 G  Approach:  Superior  Medications:  16 mg hyaluronate 16 mg/2 mL; 16 mg hyaluronate 16 mg/2 mL  Patient tolerance:  Patient tolerated the procedure well with no immediate complications

## 2018-04-24 NOTE — PROGRESS NOTES
Euflexxa Injection Procedure #1     A time out was performed, including verification of patient ID, procedure, site and side, availability of information and equipment, review of safety issues, and agreement with consent, the procedure site was marked.     The patient was prepped aseptically with povidone-iodine swabsticks. A diagnostic and therapeutic injection of 2cc Euflexxa was given under sterile technique using a 21.5g x 1.5 needle from the superolateral aspect of the bilateral knee joints in the supine position.       Mary Choi had no adverse reactions to the medication. Pain decreased. She was instructed to apply ice to the joint for 20 minutes and avoid strenuous activities for 24-36 hours following the injection. She was warned of possible blood pressure changes during that time. Following that time, she can resume activities as prior to the injection.     She was reminded to call the clinic immediately for any adverse side effects as explained in clinic today.     Lot: [id number]  Exp: 2019-04-23      Plan:  1. Physical therapy at [facility]  2. Follow up next week for next injection

## 2018-05-01 ENCOUNTER — OFFICE VISIT (OUTPATIENT)
Dept: SPORTS MEDICINE | Facility: CLINIC | Age: 63
End: 2018-05-01
Payer: MEDICARE

## 2018-05-01 VITALS
BODY MASS INDEX: 28.89 KG/M2 | HEART RATE: 66 BPM | SYSTOLIC BLOOD PRESSURE: 116 MMHG | WEIGHT: 153 LBS | DIASTOLIC BLOOD PRESSURE: 68 MMHG | HEIGHT: 61 IN

## 2018-05-01 DIAGNOSIS — M17.11 OSTEOARTHRITIS OF RIGHT KNEE: Primary | ICD-10-CM

## 2018-05-01 DIAGNOSIS — M17.12 DEGENERATIVE ARTHRITIS OF LEFT KNEE: ICD-10-CM

## 2018-05-01 PROCEDURE — 99499 UNLISTED E&M SERVICE: CPT | Mod: S$GLB,,, | Performed by: ORTHOPAEDIC SURGERY

## 2018-05-01 PROCEDURE — 99999 PR PBB SHADOW E&M-EST. PATIENT-LVL III: CPT | Mod: PBBFAC,,, | Performed by: ORTHOPAEDIC SURGERY

## 2018-05-01 PROCEDURE — 20610 DRAIN/INJ JOINT/BURSA W/O US: CPT | Mod: 50,S$GLB,, | Performed by: ORTHOPAEDIC SURGERY

## 2018-05-01 NOTE — PROCEDURES
Large Joint Aspiration/Injection  Date/Time: 5/1/2018 3:46 PM  Performed by: MARY MICHELE  Authorized by: MARY MICHELE     Consent Done?:  Yes (Verbal)  Indications:  Pain  Procedure site marked: Yes    Timeout: Prior to procedure the correct patient, procedure, and site was verified      Location:  Knee  Site:  L knee and R knee  Prep: Patient was prepped and draped in usual sterile fashion    Ultrasonic Guidance for needle placement: No  Needle size:  22 G  Approach:  Superior  Medications:  20 mg sodium hyaluronate (EUFLEXXA) 10 mg/mL(mw 2.4 -3.6 million); 20 mg sodium hyaluronate (EUFLEXXA) 10 mg/mL(mw 2.4 -3.6 million)  Patient tolerance:  Patient tolerated the procedure well with no immediate complications

## 2018-05-01 NOTE — PROGRESS NOTES
Euflexxa Injection Procedure #2     A time out was performed, including verification of patient ID, procedure, site and side, availability of information and equipment, review of safety issues, and agreement with consent, the procedure site was marked.     The patient was prepped aseptically with povidone-iodine swabsticks. A diagnostic and therapeutic injection of 2cc Euflexxa was given under sterile technique using a 21.5g x 1.5 needle from the superolateral aspect of the bilateral knee joints in the supine position.       Mary Choi had no adverse reactions to the medication. Pain decreased. She was instructed to apply ice to the joint for 20 minutes and avoid strenuous activities for 24-36 hours following the injection. She was warned of possible blood pressure changes during that time. Following that time, she can resume activities as prior to the injection.     She was reminded to call the clinic immediately for any adverse side effects as explained in clinic today.     Lot: [id number]  Exp: 2019-04-23

## 2018-05-02 ENCOUNTER — HOSPITAL ENCOUNTER (OUTPATIENT)
Dept: RADIOLOGY | Facility: OTHER | Age: 63
Discharge: HOME | End: 2018-05-02
Attending: PHYSICIAN ASSISTANT
Payer: MEDICARE

## 2018-05-02 ENCOUNTER — TELEPHONE (OUTPATIENT)
Dept: SPINE | Facility: CLINIC | Age: 63
End: 2018-05-02

## 2018-05-02 ENCOUNTER — OFFICE VISIT (OUTPATIENT)
Dept: SPINE | Facility: CLINIC | Age: 63
End: 2018-05-02
Attending: ORTHOPAEDIC SURGERY
Payer: MEDICARE

## 2018-05-02 VITALS
HEART RATE: 58 BPM | WEIGHT: 153 LBS | DIASTOLIC BLOOD PRESSURE: 64 MMHG | BODY MASS INDEX: 28.89 KG/M2 | SYSTOLIC BLOOD PRESSURE: 139 MMHG | HEIGHT: 61 IN

## 2018-05-02 DIAGNOSIS — M54.6 THORACIC BACK PAIN, UNSPECIFIED BACK PAIN LATERALITY, UNSPECIFIED CHRONICITY: ICD-10-CM

## 2018-05-02 DIAGNOSIS — M54.2 NECK PAIN: ICD-10-CM

## 2018-05-02 DIAGNOSIS — R29.2: ICD-10-CM

## 2018-05-02 DIAGNOSIS — R29.818 POSITIVE ROMBERG TEST: ICD-10-CM

## 2018-05-02 DIAGNOSIS — M54.6 THORACIC BACK PAIN, UNSPECIFIED BACK PAIN LATERALITY, UNSPECIFIED CHRONICITY: Primary | ICD-10-CM

## 2018-05-02 DIAGNOSIS — R26.89 BALANCE PROBLEMS: Primary | ICD-10-CM

## 2018-05-02 PROCEDURE — 72070 X-RAY EXAM THORAC SPINE 2VWS: CPT | Mod: 26,,, | Performed by: RADIOLOGY

## 2018-05-02 PROCEDURE — 99214 OFFICE O/P EST MOD 30 MIN: CPT | Mod: S$GLB,,, | Performed by: PHYSICIAN ASSISTANT

## 2018-05-02 PROCEDURE — 3075F SYST BP GE 130 - 139MM HG: CPT | Mod: CPTII,S$GLB,, | Performed by: PHYSICIAN ASSISTANT

## 2018-05-02 PROCEDURE — 3078F DIAST BP <80 MM HG: CPT | Mod: CPTII,S$GLB,, | Performed by: PHYSICIAN ASSISTANT

## 2018-05-02 PROCEDURE — 99999 PR PBB SHADOW E&M-EST. PATIENT-LVL V: CPT | Mod: PBBFAC,,, | Performed by: PHYSICIAN ASSISTANT

## 2018-05-02 PROCEDURE — 3008F BODY MASS INDEX DOCD: CPT | Mod: CPTII,S$GLB,, | Performed by: PHYSICIAN ASSISTANT

## 2018-05-02 PROCEDURE — 72070 X-RAY EXAM THORAC SPINE 2VWS: CPT | Mod: TC,FY

## 2018-05-02 NOTE — PROGRESS NOTES
Subjective:      Patient ID: Mary Choi is a 62 y.o. female.    Chief Complaint: Follow-up      HPI  (Maribell/Mane)    History of antiphospholipid antibody positive, depression, HTN, FM, migraines.     Last seen by Maribell 7/20/17 for back pain. Known L5-S1 DDD. Given dose pack and sent to PT. Lost to follow up.     She complains of 6 month history of intermittent neck pain that is worse with turning her neck. She has sudden onset of pain in right > left side of her neck with tingling. No arm pain. Pain grabs her and improves after a few minutes. Pain is sharp and stabbing. She is seeing Dr. Young for her left shoulder. She rates her pain as a 1 on a scale of 1-10, when she has the pain it is an 8-10. No arm weakness. No injury to her neck.     No PT, injections, or surgery on her neck. She is on baclofen and neurontin.     Patient denies fevers, chills, night sweats, nausea, vomiting, and weight loss. Patient also denies bowel/bladder dysfunction, sexual dysfunction, and any saddle anesthesia.     Patient admits to balance issues, changes in handwriting, problems with hand dexterity, and frequent dropping of items.      Review of Systems   Constitution: Negative for chills, fever, night sweats and weight gain.   Gastrointestinal: Negative for bowel incontinence, nausea and vomiting.   Genitourinary: Negative for bladder incontinence.   Neurological: Negative for disturbances in coordination and loss of balance.           Objective:        General: Mary is well-developed, well-nourished, appears stated age, in no acute distress, alert and oriented to time, place and person.     Ortho/SPM Exam    Gait: normal, Romberg is positive and she can heel/toe stand. She has some difficulty with tandem walking.     On exam of the cervical spine, pt has mild posterior cervical and bilateral trapezial tenderness.     Patient has limited ROM of cervical spine in all planes due to pain.     Skin in the cervical region is warm  to the touch without visible rashes.     Strength Testing of Bilateral UEs shows  Right :  +5/5   Left :  +5/5  Right deltoid:  +5/5   Left deltoid:  +5/5  Right biceps:  5-/5   Left biceps:  +5/5  Right triceps:  5-/5   Left triceps:  +5/5  Right wrist extension:  +5/5  Left wrist extension:  +5/5  Right wrist flexion:  +5/5  Left wrist flexion:  +5/5  Right interosseus:  5-/5  Left interosseus:  +5/5    She does not give a good effort with above strength testing.     Sensation is grossly intact to light touch in C5, C6, C7, C8, T1 distribution.     Right shoulder has no pain with IR/ER. Good ROM of shoulder and no tenderness.   Left shoulder has no pain with IR/ER. Good ROM of shoulder and no tenderness.     negative clonus in bilateral LEs.    positive hoffmans bilateral UEs.    DTRs:  Right biceps:  +2     Left biceps:  +2   Right brachioradialis:  +2  Left brachioradialis:  +2    On gross exam of bilateral LEs, patient has full painfree ROM with no signs of clubbing, laxity, cyanosis, edema, instability, and weakness.       XRAY INTERPRETATION:  X-rays of thoracic spine (AP/lat) dated 5/2/18 are personally reviewed showing good gross maintenance of thoracic disc space.        Assessment:       1. Balance problems    2. Positive Romberg test    3. Plata's reflex positive    4. Neck pain           Plan:       Orders Placed This Encounter    MRI CERVICAL SPINE WITHOUT CONTRAST    X-Ray Cervical Spine AP Lat with Flexion  Extension       6 month history of intermittent neck pain that is worse with turning her neck. She has sudden onset of pain in right > left side of her neck with tingling. No arm pain. She admits to balance issues, changes in handwriting, problems with hand dexterity, and frequent dropping of items. She has positive romberg, positive hoffmans bialterally. Has been following for neurology (Brett) for balance issues and possible vestibular disorder. Treatment options reviewed with  patient and following plan made:     - MRI of cervical spine to evaluate for central stenosis/cervical myelopathy.   - XRs of cervical spine at same time as MRI.   - Continue with prn baclofen and neurontin from other providers.   - If MRI negative for stenosis, consider PT and/or injections with pain management.   - She will f/u with Maribell to review MRI.     Follow-up: Follow-up in 2 weeks (on 5/16/2018). If there are any questions prior to this, the patient was instructed to contact the office.

## 2018-05-04 DIAGNOSIS — G62.9 NEUROPATHY: ICD-10-CM

## 2018-05-04 DIAGNOSIS — G35 MULTIPLE SCLEROSIS: ICD-10-CM

## 2018-05-04 DIAGNOSIS — M54.16 BILATERAL LUMBAR RADICULOPATHY: ICD-10-CM

## 2018-05-04 RX ORDER — GABAPENTIN 300 MG/1
CAPSULE ORAL
Qty: 270 CAPSULE | Refills: 0 | Status: SHIPPED | OUTPATIENT
Start: 2018-05-04 | End: 2018-08-09 | Stop reason: SDUPTHER

## 2018-05-04 RX ORDER — BACLOFEN 10 MG/1
TABLET ORAL
Qty: 120 TABLET | Refills: 0 | Status: SHIPPED | OUTPATIENT
Start: 2018-05-04 | End: 2018-08-07 | Stop reason: SDUPTHER

## 2018-05-08 ENCOUNTER — OFFICE VISIT (OUTPATIENT)
Dept: SPORTS MEDICINE | Facility: CLINIC | Age: 63
End: 2018-05-08
Payer: MEDICARE

## 2018-05-08 VITALS
BODY MASS INDEX: 28.89 KG/M2 | SYSTOLIC BLOOD PRESSURE: 126 MMHG | HEIGHT: 61 IN | WEIGHT: 153 LBS | DIASTOLIC BLOOD PRESSURE: 66 MMHG

## 2018-05-08 DIAGNOSIS — M17.12 DEGENERATIVE ARTHRITIS OF LEFT KNEE: ICD-10-CM

## 2018-05-08 DIAGNOSIS — M25.552 LEFT HIP PAIN: ICD-10-CM

## 2018-05-08 DIAGNOSIS — M17.11 OSTEOARTHRITIS OF RIGHT KNEE: Primary | ICD-10-CM

## 2018-05-08 PROCEDURE — 99999 PR PBB SHADOW E&M-EST. PATIENT-LVL II: CPT | Mod: PBBFAC,,, | Performed by: ORTHOPAEDIC SURGERY

## 2018-05-08 PROCEDURE — 99499 UNLISTED E&M SERVICE: CPT | Mod: S$GLB,,, | Performed by: ORTHOPAEDIC SURGERY

## 2018-05-08 PROCEDURE — 20610 DRAIN/INJ JOINT/BURSA W/O US: CPT | Mod: 50,S$GLB,, | Performed by: ORTHOPAEDIC SURGERY

## 2018-05-08 RX ORDER — DICLOFENAC SODIUM 10 MG/G
GEL TOPICAL
Qty: 300 G | Refills: 3 | Status: SHIPPED | OUTPATIENT
Start: 2018-05-08 | End: 2018-05-21 | Stop reason: SDUPTHER

## 2018-05-08 NOTE — PROCEDURES
Large Joint Aspiration/Injection  Date/Time: 5/8/2018 3:35 PM  Performed by: MARY MICHELE  Authorized by: MARY MICHELE     Consent Done?:  Yes (Verbal)  Indications:  Pain  Procedure site marked: Yes    Timeout: Prior to procedure the correct patient, procedure, and site was verified      Location:  Knee  Site:  L knee and R knee  Prep: Patient was prepped and draped in usual sterile fashion    Ultrasonic Guidance for needle placement: No  Needle size:  22 G  Approach:  Superior  Medications:  20 mg sodium hyaluronate (EUFLEXXA) 10 mg/mL(mw 2.4 -3.6 million); 20 mg sodium hyaluronate (EUFLEXXA) 10 mg/mL(mw 2.4 -3.6 million)  Patient tolerance:  Patient tolerated the procedure well with no immediate complications

## 2018-05-08 NOTE — PROGRESS NOTES
Euflexxa Injection Procedure #3     A time out was performed, including verification of patient ID, procedure, site and side, availability of information and equipment, review of safety issues, and agreement with consent, the procedure site was marked.     The patient was prepped aseptically with povidone-iodine swabsticks. A diagnostic and therapeutic injection of 2cc Euflexxa was given under sterile technique using a 21.5g x 1.5 needle from the superolateral aspect of the bilateral knee joints in the supine position.       Mary Choi had no adverse reactions to the medication. Pain decreased. She was instructed to apply ice to the joint for 20 minutes and avoid strenuous activities for 24-36 hours following the injection. She was warned of possible blood pressure changes during that time. Following that time, she can resume activities as prior to the injection.     She was reminded to call the clinic immediately for any adverse side effects as explained in clinic today.     Lot: [id number]  Exp: 2019-04-30

## 2018-05-09 ENCOUNTER — HOSPITAL ENCOUNTER (OUTPATIENT)
Dept: RADIOLOGY | Facility: HOSPITAL | Age: 63
Discharge: HOME | End: 2018-05-09
Attending: PHYSICIAN ASSISTANT
Payer: MEDICARE

## 2018-05-09 DIAGNOSIS — M54.2 NECK PAIN: ICD-10-CM

## 2018-05-09 DIAGNOSIS — R26.89 BALANCE PROBLEMS: ICD-10-CM

## 2018-05-09 DIAGNOSIS — R29.2: ICD-10-CM

## 2018-05-09 DIAGNOSIS — R29.818 POSITIVE ROMBERG TEST: ICD-10-CM

## 2018-05-09 PROCEDURE — 72050 X-RAY EXAM NECK SPINE 4/5VWS: CPT | Mod: 26,,, | Performed by: RADIOLOGY

## 2018-05-09 PROCEDURE — 72050 X-RAY EXAM NECK SPINE 4/5VWS: CPT | Mod: TC,FY

## 2018-05-09 PROCEDURE — 72141 MRI NECK SPINE W/O DYE: CPT | Mod: 26,,, | Performed by: RADIOLOGY

## 2018-05-09 PROCEDURE — 72141 MRI NECK SPINE W/O DYE: CPT | Mod: TC

## 2018-05-17 ENCOUNTER — OFFICE VISIT (OUTPATIENT)
Dept: RHEUMATOLOGY | Facility: CLINIC | Age: 63
End: 2018-05-17
Payer: MEDICARE

## 2018-05-17 VITALS
BODY MASS INDEX: 28.17 KG/M2 | WEIGHT: 149.19 LBS | DIASTOLIC BLOOD PRESSURE: 66 MMHG | SYSTOLIC BLOOD PRESSURE: 121 MMHG | HEART RATE: 67 BPM | HEIGHT: 61 IN

## 2018-05-17 DIAGNOSIS — R22.9 MULTIPLE SKIN NODULES: ICD-10-CM

## 2018-05-17 DIAGNOSIS — M79.7 FIBROMYALGIA: ICD-10-CM

## 2018-05-17 DIAGNOSIS — R53.83 OTHER FATIGUE: ICD-10-CM

## 2018-05-17 DIAGNOSIS — R76.0 ANTIPHOSPHOLIPID ANTIBODY POSITIVE: Primary | ICD-10-CM

## 2018-05-17 PROCEDURE — 99999 PR PBB SHADOW E&M-EST. PATIENT-LVL III: CPT | Mod: PBBFAC,,, | Performed by: INTERNAL MEDICINE

## 2018-05-17 PROCEDURE — 99214 OFFICE O/P EST MOD 30 MIN: CPT | Mod: S$GLB,,, | Performed by: INTERNAL MEDICINE

## 2018-05-17 PROCEDURE — 3074F SYST BP LT 130 MM HG: CPT | Mod: CPTII,S$GLB,, | Performed by: INTERNAL MEDICINE

## 2018-05-17 PROCEDURE — 3008F BODY MASS INDEX DOCD: CPT | Mod: CPTII,S$GLB,, | Performed by: INTERNAL MEDICINE

## 2018-05-17 PROCEDURE — 3078F DIAST BP <80 MM HG: CPT | Mod: CPTII,S$GLB,, | Performed by: INTERNAL MEDICINE

## 2018-05-17 ASSESSMENT — ROUTINE ASSESSMENT OF PATIENT INDEX DATA (RAPID3)
MDHAQ FUNCTION SCORE: 1.2
TOTAL RAPID3 SCORE: 6
FATIGUE SCORE: 6
PATIENT GLOBAL ASSESSMENT SCORE: 6
WHEN YOU AWAKENED IN THE MORNING OVER THE LAST WEEK, PLEASE INDICATE THE AMOUNT OF TIME IT TAKES UNTIL YOU ARE AS LIMBER AS YOU WILL BE FOR THE DAY: 30 MINS
AM STIFFNESS SCORE: 1, YES
PAIN SCORE: 8
PSYCHOLOGICAL DISTRESS SCORE: 1.1

## 2018-05-17 NOTE — PROGRESS NOTES
"Subjective:       Patient ID: Mary Choi is a 62 y.o. female.    Chief Complaint: Fibromyalgia    HPI:  Mary Choi is a 62 y.o. female sent initially in consultation by Dr. Melinda Paul regarding possible antiphospholipid antibody syndrome. The patient has had since 2002 several episodes of loss of balance, distorted vision, and a electrical spasms in the legs he go back and forth. She was seen by Dr. Bryant in neurology who sent her to Dr. Paul. She also saw the rheumatologist Dr. Metzger who did lab work and rules out evidence of Lupus. She had an MRI done that showed evidence of nonspecific white matter T2 changes that could be ischemic versus demyelinating disorder. She has a history of oligoclonal band positivity in the CSF. She had antiphospholipid antibody studies done and on 2 separate occasions was found to have a positive anticardiolipin IgM and IgG. She was also found to have a weakly positive anticardiolipin antibody. She did not fit criteria for APLS.     She has fibromyalgia and complains of 2x a month hands cramping and feet started cramping daily but resolved for 4 days.  Occurs at rest after she does a slight movement.   She feels gabapentin 300 mg noon and 600 mg at night helps leg pains.    Review of Systems      Objective:   /66   Pulse 67   Ht 5' 1.2" (1.554 m)   Wt 67.7 kg (149 lb 3.2 oz)   BMI 28.01 kg/m²      Physical Exam   Constitutional: She is oriented to person, place, and time and well-developed, well-nourished, and in no distress.   HENT:   Head: Normocephalic and atraumatic.   Eyes: Conjunctivae and EOM are normal.   Neck: Neck supple.   Cardiovascular: Normal rate, regular rhythm and normal heart sounds.    Pulmonary/Chest: Effort normal and breath sounds normal.   Abdominal: Soft. Bowel sounds are normal.   Neurological: She is alert and oriented to person, place, and time. Gait normal.   Skin: Skin is warm and dry.     Psychiatric: Mood and affect normal. "   Musculoskeletal:   8/18 FM points painful            Assessment:       1. 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 Patient without obvious clinical event to help confirm diagnosis.  2. Arthralgias. Now dealing with OA of knee s/p viscosupplementation  3. History of positive VASILE   4. Firm nodules on arms and back diagnoses as fatty deposits but patient concerned about them and would like further evaluation.   5. Depression. Not currently on any treatment.   In the past she was unable Effexor, Cymbalta and Ativan.  She also used Buspar.  6. Fatigue  7.  Fibromyalgia.  Patient with multiple symptoms. WPI 16 and SS 7 in the past which does fit with fibromyalgia    8.  Low normal vitamin D  9.  Overweight.  Encouraged to go to gym 3 x a week.      Plan:       1. No evidence of lupus or rheumatoid arthritis by history or exam.    Will continue monitor periodically especially with family history of lupus in a sister.    2. Follow up with orthopedic/sports medicine  3. Patient to consider proceed with aspirin therapy as recommended by .  4. Refer dermatology for nodules that are increasing.    5. Patient declined glucosamine and chondroitin due to cost  6.  Continue gabapentin   7.  Labs external       Return to the office in 6 months/prn  Patient seen face to face for 25 minutes and greater than 50% spent in counseling regarding fibromyalgia.

## 2018-05-21 ENCOUNTER — OFFICE VISIT (OUTPATIENT)
Dept: ORTHOPEDICS | Facility: CLINIC | Age: 63
End: 2018-05-21
Payer: MEDICARE

## 2018-05-21 VITALS — HEIGHT: 61 IN | WEIGHT: 149 LBS | BODY MASS INDEX: 28.13 KG/M2

## 2018-05-21 DIAGNOSIS — M17.12 PRIMARY OSTEOARTHRITIS OF LEFT KNEE: ICD-10-CM

## 2018-05-21 DIAGNOSIS — M75.40 ROTATOR CUFF IMPINGEMENT SYNDROME, UNSPECIFIED LATERALITY: Primary | ICD-10-CM

## 2018-05-21 DIAGNOSIS — M17.11 PRIMARY OSTEOARTHRITIS OF RIGHT KNEE: ICD-10-CM

## 2018-05-21 PROCEDURE — 3008F BODY MASS INDEX DOCD: CPT | Mod: CPTII,S$GLB,, | Performed by: ORTHOPAEDIC SURGERY

## 2018-05-21 PROCEDURE — 99999 PR PBB SHADOW E&M-EST. PATIENT-LVL II: CPT | Mod: PBBFAC,,, | Performed by: ORTHOPAEDIC SURGERY

## 2018-05-21 PROCEDURE — 20610 DRAIN/INJ JOINT/BURSA W/O US: CPT | Mod: RT,S$GLB,, | Performed by: ORTHOPAEDIC SURGERY

## 2018-05-21 PROCEDURE — 99213 OFFICE O/P EST LOW 20 MIN: CPT | Mod: 25,S$GLB,, | Performed by: ORTHOPAEDIC SURGERY

## 2018-05-21 RX ORDER — TRIAMCINOLONE ACETONIDE 40 MG/ML
40 INJECTION, SUSPENSION INTRA-ARTICULAR; INTRAMUSCULAR
Status: COMPLETED | OUTPATIENT
Start: 2018-05-21 | End: 2018-05-21

## 2018-05-21 RX ORDER — DICLOFENAC SODIUM 10 MG/G
GEL TOPICAL
Qty: 300 G | Refills: 3 | Status: SHIPPED | OUTPATIENT
Start: 2018-05-21 | End: 2018-05-29

## 2018-05-21 RX ADMIN — TRIAMCINOLONE ACETONIDE 40 MG: 40 INJECTION, SUSPENSION INTRA-ARTICULAR; INTRAMUSCULAR at 02:05

## 2018-05-21 NOTE — PROGRESS NOTES
HISTORY OF PRESENT ILLNESS:  Mrs. Choi seen previously for shoulder symptoms.    She is doing better with the left shoulder, but having a flare-up in the right   shoulder now.  Symptoms worse with use, particularly elevation.    PHYSICAL EXAMINATION:  RIGHT SHOULDER:  There is some tenderness posterolaterally along the joint line   right shoulder.  Range of motion full.  Positive impingement sign.  No   instability.  Rotator cuff strength is intact.    IMPRESSION:  Right shoulder impingement.    PLAN:  After pause for timeout, the patient identified the right shoulder   injected with combination of Kenalog 40 mg, 2 mL Xylocaine, sterile technique.    She tolerated the procedure well without complication.    I have also recommended she continue with Advil or Motrin by mouth.  We will   also order the Voltaren gel for topical use on her shoulder.  Follow up in 1-2   months or p.r.n.      CLYDE/MARGARET  dd: 05/21/2018 14:57:57 (CDT)  td: 05/22/2018 11:47:34 (CDT)  Doc ID   #[id number]  Job ID #[id number]    CC:

## 2018-05-22 ENCOUNTER — TELEPHONE (OUTPATIENT)
Dept: ORTHOPEDICS | Facility: CLINIC | Age: 63
End: 2018-05-22

## 2018-05-22 NOTE — TELEPHONE ENCOUNTER
----- Message from Dahiana Mendiola sent at 5/22/2018 10:30 AM CDT -----  Contact: Tamica martin/ [facility] [phone] ext [phone]  Tamica is requesting more information regarding prior authorization for diclofenac sodium 1 % Gel. Please advise.

## 2018-05-22 NOTE — TELEPHONE ENCOUNTER
----- Message from Maame Dyson sent at 5/22/2018 10:11 AM CDT -----  Contact: [phone]/ self  Patient would like call back regarding [employer] sending over prior authorization for diclofenac sodium 1 % Gel. Patient is requesting a 90 day supply. Please advise.

## 2018-05-23 ENCOUNTER — TELEPHONE (OUTPATIENT)
Dept: ORTHOPEDICS | Facility: CLINIC | Age: 63
End: 2018-05-23

## 2018-05-23 ENCOUNTER — INITIAL CONSULT (OUTPATIENT)
Dept: DERMATOLOGY | Facility: CLINIC | Age: 63
End: 2018-05-23
Payer: MEDICARE

## 2018-05-23 DIAGNOSIS — Z12.83 SCREENING EXAM FOR SKIN CANCER: ICD-10-CM

## 2018-05-23 DIAGNOSIS — L81.4 LENTIGO: ICD-10-CM

## 2018-05-23 DIAGNOSIS — L72.0 EPIDERMOID CYST: ICD-10-CM

## 2018-05-23 DIAGNOSIS — D17.9 LIPOMA, UNSPECIFIED SITE: Primary | ICD-10-CM

## 2018-05-23 PROCEDURE — 99213 OFFICE O/P EST LOW 20 MIN: CPT | Mod: S$GLB,,, | Performed by: DERMATOLOGY

## 2018-05-23 PROCEDURE — 99999 PR PBB SHADOW E&M-EST. PATIENT-LVL II: CPT | Mod: PBBFAC,,, | Performed by: DERMATOLOGY

## 2018-05-23 NOTE — PROGRESS NOTES
Subjective:       Patient ID:  Mary Choi is a 62 y.o. female who presents for   Chief Complaint   Patient presents with    Mass     on right arm, lower back, thighs      Mass  - Initial  Affected locations: right arm, left upper leg and right upper leg  Duration: 9 years  Signs / symptoms: pain  Aggravated by: nothing  Relieving factors/Treatments tried: nothing    Also has firm one on buttock.  None of these areas are painful all the time but sporadically.  Not itchy, hurting, rapidly growing.  Newest ones are left lower back. Right forearm has 3.  Interested in removal options.    Review of Systems   Constitutional: Positive for fatigue. Negative for fever, chills and malaise.   Skin: Positive for activity-related sunscreen use. Negative for daily sunscreen use.   Hematologic/Lymphatic: Bruises/bleeds easily.        Bruise        Objective:    Physical Exam   Constitutional: She appears well-developed and well-nourished. No distress.   Neurological: She is alert and oriented to person, place, and time. She is not disoriented.   Psychiatric: She has a normal mood and affect.   Skin:   Areas Examined (abnormalities noted in diagram):   Head / Face Inspection Performed  Neck Inspection Performed  Chest / Axilla Inspection Performed  Abdomen Inspection Performed  Genitals / Buttocks / Groin Inspection Performed  Back Inspection Performed  RUE Inspected  LUE Inspection Performed  RLE Inspected  LLE Inspection Performed  Nails and Digits Inspection Performed              Diagram Legend     Erythematous scaling macule/papule c/w actinic keratosis       Vascular papule c/w angioma      Pigmented verrucoid papule/plaque c/w seborrheic keratosis      Yellow umbilicated papule c/w sebaceous hyperplasia      Irregularly shaped tan macule c/w lentigo     1-2 mm smooth white papules consistent with Milia      Movable subcutaneous cyst with punctum c/w epidermal inclusion cyst      Subcutaneous movable cyst c/w pilar  cyst      Firm pink to brown papule c/w dermatofibroma      Pedunculated fleshy papule(s) c/w skin tag(s)      Evenly pigmented macule c/w junctional nevus     Mildly variegated pigmented, slightly irregular-bordered macule c/w mildly atypical nevus      Flesh colored to evenly pigmented papule c/w intradermal nevus       Pink pearly papule/plaque c/w basal cell carcinoma      Erythematous hyperkeratotic cursted plaque c/w SCC      Surgical scar with no sign of skin cancer recurrence      Open and closed comedones      Inflammatory papules and pustules      Verrucoid papule consistent consistent with wart     Erythematous eczematous patches and plaques     Dystrophic onycholytic nail with subungual debris c/w onychomycosis     Umbilicated papule    Erythematous-base heme-crusted tan verrucoid plaque consistent with inflamed seborrheic keratosis     Erythematous Silvery Scaling Plaque c/w Psoriasis     See annotation      Assessment / Plan:        Lipoma, unspecified site   - discussed surgical excision  - favor benign lipomas/angiolipomas  -reassured  - will leave a scar, patient to call back to schedule surgery of site of her choice    Epidermoid cyst  - calcified cyst/lipoma right buttock - can excise if pt would like    Lentigo  This is a benign hyperpigmented sun induced lesion. Daily sun protection will reduce the number of new lesions. Treatment of these benign lesions are considered cosmetic.    Screening exam for skin cancer    Total body skin examination performed today including at least 12 points as noted in physical examination. No lesions suspicious for malignancy noted.             No Follow-up on file.

## 2018-05-23 NOTE — LETTER
May 23, 2018      Jaye Alcocer MD  [address]  [place] [address]           [facility]  [address]  [place] [address]  Phone: [phone]  Fax: [phone]          Patient: Mary Choi   MR Number: [id number]   YOB: 1955   Date of Visit: 5/23/2018       Dear Dr. Jaye Alcocer:    Thank you for referring Mary Chio to me for evaluation. Attached you will find relevant portions of my assessment and plan of care.    If you have questions, please do not hesitate to call me. I look forward to following Mary Choi along with you.    Sincerely,    Bria Christian MD    Enclosure  CC:  No Recipients    If you would like to receive this communication electronically, please contact [email or web address] or [phone] to request more information on [email or web address] ([employer] Link access.    For providers and/or their staff who would like to refer a patient to [facility], please contact us through our one-stop-shop provider referral line, [facility], at [phone].    If you feel you have received this communication in error or would no longer like to receive these types of communications, please e-mail [email or web address]

## 2018-05-23 NOTE — TELEPHONE ENCOUNTER
Spoke with [employer] rep and gave information for VAUGHN king voltaren gel 1%. Understanding verbalized and will fax results over to office.

## 2018-05-24 ENCOUNTER — CLINICAL SUPPORT (OUTPATIENT)
Dept: REHABILITATION | Facility: HOSPITAL | Age: 63
End: 2018-05-24
Payer: MEDICARE

## 2018-05-24 DIAGNOSIS — M25.669 DECREASED RANGE OF MOTION (ROM) OF KNEE: ICD-10-CM

## 2018-05-24 DIAGNOSIS — Z74.09 DECREASED MOBILITY AND ENDURANCE: ICD-10-CM

## 2018-05-24 DIAGNOSIS — R53.1 DECREASED STRENGTH: ICD-10-CM

## 2018-05-24 PROCEDURE — G8979 MOBILITY GOAL STATUS: HCPCS | Mod: CK,PN

## 2018-05-24 PROCEDURE — 97110 THERAPEUTIC EXERCISES: CPT | Mod: PN

## 2018-05-24 PROCEDURE — G8978 MOBILITY CURRENT STATUS: HCPCS | Mod: CL,PN

## 2018-05-24 PROCEDURE — 97161 PT EVAL LOW COMPLEX 20 MIN: CPT | Mod: PN
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
## 2018-05-25 ENCOUNTER — OFFICE VISIT (OUTPATIENT)
Dept: SPINE | Facility: CLINIC | Age: 63
End: 2018-05-25
Payer: MEDICARE

## 2018-05-25 ENCOUNTER — TELEPHONE (OUTPATIENT)
Dept: ORTHOPEDICS | Facility: CLINIC | Age: 63
End: 2018-05-25

## 2018-05-25 VITALS
SYSTOLIC BLOOD PRESSURE: 151 MMHG | BODY MASS INDEX: 27.38 KG/M2 | HEART RATE: 73 BPM | WEIGHT: 145 LBS | HEIGHT: 61 IN | DIASTOLIC BLOOD PRESSURE: 72 MMHG

## 2018-05-25 DIAGNOSIS — M47.812 CERVICAL SPONDYLOSIS WITHOUT MYELOPATHY: ICD-10-CM

## 2018-05-25 DIAGNOSIS — M50.30 DEGENERATION OF CERVICAL INTERVERTEBRAL DISC: ICD-10-CM

## 2018-05-25 DIAGNOSIS — M54.2 NECK PAIN: ICD-10-CM

## 2018-05-25 PROCEDURE — 3008F BODY MASS INDEX DOCD: CPT | Mod: CPTII,S$GLB,, | Performed by: PHYSICIAN ASSISTANT

## 2018-05-25 PROCEDURE — 3078F DIAST BP <80 MM HG: CPT | Mod: CPTII,S$GLB,, | Performed by: PHYSICIAN ASSISTANT

## 2018-05-25 PROCEDURE — 99214 OFFICE O/P EST MOD 30 MIN: CPT | Mod: S$GLB,,, | Performed by: PHYSICIAN ASSISTANT

## 2018-05-25 PROCEDURE — 99999 PR PBB SHADOW E&M-EST. PATIENT-LVL V: CPT | Mod: PBBFAC,,, | Performed by: PHYSICIAN ASSISTANT

## 2018-05-25 PROCEDURE — 3077F SYST BP >= 140 MM HG: CPT | Mod: CPTII,S$GLB,, | Performed by: PHYSICIAN ASSISTANT

## 2018-05-25 NOTE — PROGRESS NOTES
"Subjective:     Patient ID:  Mary Choi is a 62 y.o. female.      Chief Complaint: Neck pain    HPI    Mary Choi is a 62 y.o. female who presents for xray and MRI follow up.  She saw Chasity at her last visit for neck pain.  I had seen her in the past for back pain.    Neck pain started about 6 months ago.  Pain comes and goes rated 6-7/10.  No arm pain.  Hand and feet cramp up on and off.    Patient denies any recent accidents or trauma, no saddle anesthesias, and no bowel or bladder incontinence.        Review of Systems:  Constitution: Negative for chills, fever, night sweats and weight loss.   Musculoskeletal: Negative for falls.   Gastrointestinal: Negative for bowel incontinence, nausea and vomiting.   Genitourinary: Negative for bladder incontinence.   Neurological: Positive for disturbances in coordination and loss of balance.      Objective:      Vitals:    05/25/18 1414   BP: (!) 151/72   Pulse: 73   Weight: 65.8 kg (145 lb)   Height: 5' 1" (1.549 m)   PainSc:   7   PainLoc: Neck         Physical Exam:    General:  Mary Choi is well-developed, well-nourished, appears stated age, in no acute distress, alert and oriented to person, place, and time.    Patient sits comfortably in the exam room and answers questions appropriately. Grossly patient is able to move bilateral upper extremities without difficulty.     Xray/MRI Interpretation:     Cervical spine ap/lateral/flexion/extension xrays were personally reviewed today.  No fractures.  No movement on flexion and extension.  Mild DDD at C5-6.    Cervical spine MRI was personally reviewed today.  Mild DDD at C5-6 with BBDD and slight indentation of the spinal cord anteriorly.      Assessment:          1. Cervical spondylosis without myelopathy    2. Degeneration of cervical intervertebral disc    3. Neck pain            Plan:          Orders Placed This Encounter    Ambulatory referral to Physical Therapy - Cervical     C5-6 DDD/BBDD    -PT " through [facility] in three months and would consider injections if needed in the future      Follow-Up:  Follow-up in 3 months (on 8/25/2018). If there are any questions prior to this, the patient was instructed to contact the office.       NIURKA Bolaños, PA-C  Neurosurgery  [facility]  [facility]

## 2018-05-25 NOTE — TELEPHONE ENCOUNTER
----- Message from Vernell Rodriguez RN sent at 5/25/2018  9:36 AM CDT -----  Contact: self   Dr. Young prescribed this medication for the pt.    ----- Message -----  From: Felipa Stern  Sent: 5/25/2018   9:17 AM  To: Lloyd MUNGUIA Staff    Pt would like a prior authorization for diclofenac sodium 1 % Gel sent to [email or web address]. Pt can be reached at [phone].

## 2018-05-29 DIAGNOSIS — M17.12 PRIMARY OSTEOARTHRITIS OF LEFT KNEE: ICD-10-CM

## 2018-05-29 DIAGNOSIS — M17.11 PRIMARY OSTEOARTHRITIS OF RIGHT KNEE: ICD-10-CM

## 2018-05-29 RX ORDER — DICLOFENAC SODIUM 10 MG/G
2 GEL TOPICAL 4 TIMES DAILY
Qty: 1 TUBE | Refills: 2 | Status: SHIPPED | OUTPATIENT
Start: 2018-05-29 | End: 2018-06-08

## 2018-05-29 NOTE — TELEPHONE ENCOUNTER
----- Message from Steffen Root MA sent at 5/29/2018  8:43 AM CDT -----  Contact: Pt  Pt called and have been sending massages.The patient would like a call back from the nurse.          Pt can  be reached at .      Thanks

## 2018-06-05 ENCOUNTER — CLINICAL SUPPORT (OUTPATIENT)
Dept: REHABILITATION | Facility: HOSPITAL | Age: 63
End: 2018-06-05
Payer: MEDICARE

## 2018-06-05 DIAGNOSIS — M25.669 DECREASED RANGE OF MOTION (ROM) OF KNEE: ICD-10-CM

## 2018-06-05 DIAGNOSIS — R53.1 DECREASED STRENGTH: ICD-10-CM

## 2018-06-05 DIAGNOSIS — Z74.09 DECREASED MOBILITY AND ENDURANCE: ICD-10-CM

## 2018-06-05 PROCEDURE — 97110 THERAPEUTIC EXERCISES: CPT | Mod: PN

## 2018-06-05 NOTE — PROGRESS NOTES
"DAILY TREATMENT NOTE    DATE: 6/5/2018    Start Time:  206  Stop Time:  300    PROCEDURES:    TIMED  Procedure Min.   Therex 54             Total Timed Minutes:  54  Total Timed Units:  4  Total Untimed Units:  0  Charges Billed/# of units:  4 TE      Progress/Current Status    Subjective:     Patient ID: Mary Choi is a 62 y.o. female.  Diagnosis:   1. Decreased range of motion (ROM) of knee     2. Decreased strength     3. Decreased mobility and endurance       Pain: 1 /10 Knees  "I just don't feel well today - all over."    Objective:     Patinet initiated session on bike x 5 minutes for B LE ROM and joint nutrition, supervised.  Patient then performed all therex as per log with 1:1 by PTA x 54 including instruction of then performance of added therex as noted. Required several short breaks between therex due to complaint of LE "cramping."    DATE 6/5/18 5/24/2018   VISIT 2/12 1/12   POC 7/24/18      G CODE 2/10 1/10   FOTO 2/5 1/5   Cap Visit  Cap Total 121.28  234.48 113.20  113.20   Bike 5'     MHP --     MT --     Stretches:      HS stretch 30"x2 B w/strap + stairs 1x30'' R   IT band stretch      Supine:      Quad sets 5"x10 B 5x5'' R   SLR 2x10 B     SAQ      Knee flexion stretch      Heel slides 1x10 R 5x R   SL hip abd 2x10 B 4x R   Clams      Hip add      Bridges 2x10 10x          Prone:      Quad stretch      HS curls             Seated:      LAQ 2x10 B     Marching 2x10 B             Standing:      Heel raises next     Calf stretch on fitter      Steamboats next     TKEs      SLS      Tandem walking      Cybex HS curls      Cybex LAQ      Leg Press      CP      INITIALS DH 1/6 SHAUN         Assessment:     All therex performed with B LE today due to reports of both Knees hurting intermittently.  Required increased time to perform therex, but able to complete same without complaint of increased pain after session.    Patient Education/Response:     Patient educated to continue HEP.  Verbalized " understanding.    Plans and Goals:     Short Term Goals:  4 weeks  1.Report decreased B knee pain </=5/10 with moderate activities to increase tolerance for ADLs and increased functional mobility.  2. Increase B knee AROM to 0-120 in order to be able to perform ADLs without difficulty.  3. Increase strength by 1/3 MMT grade in BLE  to increase tolerance for ADL and work activities.  4. Pt to tolerate HEP to improve ROM and independence with ADL's.     Long Term Goals: 8 weeks  1.Report decreased B knee pain </= 2/10  to increase tolerance for ADLs.  2.Patient goal: Just to get better.  3.Increase strength to >/= 4+/5 in BLE to increase tolerance for ADL and work activities.  4. Pt will report at CK level (</= 55% impaired) on KNEE FOTO to demonstrate increase in LE function with every day tasks.   5. Pt will negotiate 8 steps and ambulate community distances at >/= Mod I for increased functional mobility.  6. Increase B knee AROM to 0-130 in order to be able to perform ADLs without difficulty.

## 2018-06-11 ENCOUNTER — TELEPHONE (OUTPATIENT)
Dept: ORTHOPEDICS | Facility: CLINIC | Age: 63
End: 2018-06-11

## 2018-06-11 NOTE — TELEPHONE ENCOUNTER
----- Message from Samson Sutherland sent at 6/11/2018  9:55 AM CDT -----  Contact: self   Patient ask for a sooner appointment due to in pain now with left hip Patient can be reached at

## 2018-06-12 ENCOUNTER — LAB VISIT (OUTPATIENT)
Dept: LAB | Facility: OTHER | Age: 63
End: 2018-06-12
Payer: MEDICARE

## 2018-06-12 ENCOUNTER — OFFICE VISIT (OUTPATIENT)
Dept: NEUROLOGY | Facility: CLINIC | Age: 63
End: 2018-06-12
Payer: MEDICARE

## 2018-06-12 VITALS
WEIGHT: 144.81 LBS | HEIGHT: 61 IN | SYSTOLIC BLOOD PRESSURE: 142 MMHG | DIASTOLIC BLOOD PRESSURE: 65 MMHG | HEART RATE: 65 BPM | BODY MASS INDEX: 27.34 KG/M2

## 2018-06-12 DIAGNOSIS — G62.9 NEUROPATHY: ICD-10-CM

## 2018-06-12 DIAGNOSIS — R76.0 ANTIPHOSPHOLIPID ANTIBODY POSITIVE: ICD-10-CM

## 2018-06-12 DIAGNOSIS — G45.9 TRANSIENT CEREBRAL ISCHEMIA, UNSPECIFIED TYPE: ICD-10-CM

## 2018-06-12 DIAGNOSIS — H81.90 DISORDER OF VESTIBULAR FUNCTION, UNSPECIFIED LATERALITY: ICD-10-CM

## 2018-06-12 DIAGNOSIS — R26.89 IMBALANCE: ICD-10-CM

## 2018-06-12 DIAGNOSIS — H81.90 VESTIBULAR DISORDER, UNSPECIFIED LATERALITY: Primary | ICD-10-CM

## 2018-06-12 LAB
T3 SERPL-MCNC: 72 NG/DL
THYROPEROXIDASE IGG SERPL-ACNC: <6 IU/ML
TSH SERPL DL<=0.005 MIU/L-ACNC: 1.23 UIU/ML

## 2018-06-12 PROCEDURE — 3078F DIAST BP <80 MM HG: CPT | Mod: CPTII,S$GLB,, | Performed by: PSYCHIATRY & NEUROLOGY

## 2018-06-12 PROCEDURE — 84480 ASSAY TRIIODOTHYRONINE (T3): CPT

## 2018-06-12 PROCEDURE — 99999 PR PBB SHADOW E&M-EST. PATIENT-LVL III: CPT | Mod: PBBFAC,,, | Performed by: PSYCHIATRY & NEUROLOGY

## 2018-06-12 PROCEDURE — 36415 COLL VENOUS BLD VENIPUNCTURE: CPT

## 2018-06-12 PROCEDURE — 3008F BODY MASS INDEX DOCD: CPT | Mod: CPTII,S$GLB,, | Performed by: PSYCHIATRY & NEUROLOGY

## 2018-06-12 PROCEDURE — 86376 MICROSOMAL ANTIBODY EACH: CPT

## 2018-06-12 PROCEDURE — 3077F SYST BP >= 140 MM HG: CPT | Mod: CPTII,S$GLB,, | Performed by: PSYCHIATRY & NEUROLOGY

## 2018-06-12 PROCEDURE — 84443 ASSAY THYROID STIM HORMONE: CPT

## 2018-06-12 PROCEDURE — 99214 OFFICE O/P EST MOD 30 MIN: CPT | Mod: S$GLB,,, | Performed by: PSYCHIATRY & NEUROLOGY

## 2018-06-12 NOTE — PATIENT INSTRUCTIONS

## 2018-06-12 NOTE — PROGRESS NOTES
Subjective:       Patient ID: Mary Choi is a 62 y.o. female.    Reason for Consult: Fall      Interval History:  Mary Choi is here for follow up. Their condition has changed.  She was supposed to have seen me 2 months after her last visit.  She missed that appointment and is not scheduled a visit until now.  She notes that she is having new vague indescribable symptoms.  Speaking in Serbian the patient forms me that she will have these episodes 2 to 3 times a week which she has to hold on to pieces of furniture because she has a change of her cognition to a different plane of existence.  When asked to specify this further she states that during these episodes she is fully conscious and does not have any motor problems nor any bowel or bladder incontinence.  She notes there are no tremors and no shaking.  She notes that she feels as if she cannot control her arms and her legs for up to 8-10 minutes however notes that she goes back to baseline after this episode is done.        Objective:     Vitals:    06/12/18 1523   BP: (!) 142/65   Pulse: 65     Patient is awake alert oriented to person place and time.  Moves all 4 extremities against gravity.  Gait and station within normal limits.  Cranial nerves 2-12 were without focal deficits.  Focused examination was undertaken today. Over 50% of face to face time of 25 minute visit time was in giving guidance, counseling and discussing treatment options.    Results for orders placed or performed during the hospital encounter of 12/06/16   MRI Brain W WO Contrast    Narrative    MRI OF THE BRAIN WITHOUT AND WITH IV CONTRAST:     Technique: Multiplanar, multisequence MR images of the brain were obtained before and after the administration of 7 cc gadolinium-based IV contrast. 3-D time-of-flight MRA images of the brain were also obtained.    Comparison: MRI brain 4/29/2014       Findings:    MRI brain:  There is no restricted diffusion to suggest acute  infarction. There is redemonstration of multiple foci of T2/FLAIR signal hyperintensity within the supratentorial white matter, the overall extent and distribution of which appear stable from prior MRI examination of 4/29/2014. There is no corresponding restricted diffusion or post contrast enhancement within these regions of flair signal hyperintensity. Findings likely represent sequela of chronic microvascular ischemic change. There is no evidence of acute intracranial hemorrhage, hydrocephalus, midline shift or mass effect. No pathologic foci of enhancement are identified on postcontrast images.    The craniocervical junction and sellar region are unremarkable. Major skull base base T2 flow-voids are present. Visualized globes and orbits as well as the paranasal sinuses are unremarkable.    MRA brain:  The distal cervical, petrous, cavernous and supraclinoid segments of the ICAs are patent bilaterally. The ACAs and MCAs are patent.    The distal right vertebral artery is dominant. The left vertebral artery is hypoplastic, essentially ending in PICA. The basilar artery is patent. 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 There is a small focal outpouching which appears to be contiguous with the left superior cerebellar artery, likely representing small infundibulum. There is slight narrowing of the pre-pontine segment of the left superior cerebellar artery.    Impression    1. No evidence of acute intracranial abnormality, specifically no evidence of acute infarct.    2. Relatively stable appearing foci of T2/FLAIR signal hyperintensity in the supratentorial white matter, which is nonspecific but likely represent sequela of chronic microvascular ischemic change.    3. Diminutive caliber of the basilar artery, likely secondary to essentially persistent bilateral fetal configuration of the PCAs. Small  left superior cerebellar artery infundibulum. Nonspecific slight narrowing of the left pre-pontine segment of the left superior cerebellar artery.        Electronically signed by: BRENDAN BARRY  Date:     12/07/16  Time:    05:26    Results for orders placed or performed during the hospital encounter of 12/06/16   MRA Brain without contrast    Narrative    MRI OF THE BRAIN WITHOUT AND WITH IV CONTRAST:     Technique: Multiplanar, multisequence MR images of the brain were obtained before and after the administration of 7 cc gadolinium-based IV contrast. 3-D time-of-flight MRA images of the brain were also obtained.    Comparison: MRI brain 4/29/2014       Findings:    MRI brain:  There is no restricted diffusion to suggest acute infarction. There is redemonstration of multiple foci of T2/FLAIR signal hyperintensity within the supratentorial white matter, the overall extent and distribution of which appear stable from prior MRI examination of 4/29/2014. There is no corresponding restricted diffusion or post contrast enhancement within these regions of flair signal hyperintensity. Findings likely represent sequela of chronic microvascular ischemic change. There is no evidence of acute intracranial hemorrhage, hydrocephalus, midline shift or mass effect. No pathologic foci of enhancement are identified on postcontrast images.    The craniocervical junction and sellar region are unremarkable. Major skull base base T2 flow-voids are present. Visualized globes and orbits as well as the paranasal sinuses are unremarkable.    MRA brain:  The distal cervical, petrous, cavernous and supraclinoid segments of the ICAs are patent bilaterally. The ACAs and MCAs are patent.    The distal right vertebral artery is dominant. The left vertebral artery is hypoplastic, essentially ending in PICA. The basilar artery is patent. Basilar artery is slightly diminutive in caliber, likely secondary to bilateral fetal (ICA origin with ipsilateral  P1 hypoplasia) configuration of the PCAs. There is a small focal outpouching which appears to be contiguous with the left superior cerebellar artery, likely representing small infundibulum. There is slight narrowing of the pre-pontine segment of the left superior cerebellar artery.    Impression    1. No evidence of acute intracranial abnormality, specifically no evidence of acute infarct.    2. Relatively stable appearing foci of T2/FLAIR signal hyperintensity in the supratentorial white matter, which is nonspecific but likely represent sequela of chronic microvascular ischemic change.    3. Diminutive caliber of the basilar artery, likely secondary to essentially persistent bilateral fetal configuration of the PCAs. Small left superior cerebellar artery infundibulum. Nonspecific slight narrowing of the left pre-pontine segment of the left superior cerebellar artery.        Electronically signed by: BRENDAN BARRY  Date:     12/07/16  Time:    05:26        Assessment/Plan:     Problem List Items Addressed This Visit        Neuro    Transient cerebral ischemia    Relevant Orders    MRI Brain Without Contrast    MRA Brain without contrast    EEG,w/awake & asleep record       ENT    Vestibular disorder    Overview     Multiple ENT visits yield no specific treatable entity, dizziness not strictly peripheral, question supratentorial              Hematology    Antiphospholipid antibody positive    Relevant Orders    MRI Brain Without Contrast    MRA Brain without contrast    EEG,w/awake & asleep record       Other    Imbalance    Overview     MRI/MRA, EEG, Carotids negative during workup so far in 2016  New symptoms of vague symptoms feeling out of body and having no motor control for 8-10 minutes at a time, without chronic neurologic deficits, suggests imbalance             Other Visit Diagnoses     Vestibular disorder, unspecified laterality    -  Primary    Relevant Orders    MRI Brain Without Contrast    MRA Brain  without contrast    EEG,w/awake & asleep record        62-year-old right-handed  female presents for evaluation and follow-up.  At this time she is having a new onset episode of vague symptoms.  At this point in time I am concerned that this could be a cerebrovascular event or an electrographic complex seizure although this med is less likely given the fact that she is able to remain alert and cognizant of her surroundings at the time of these attacks.  If the imaging shows no progression of white matter change or chronic microvascular ischemic disease due to her chronic anti phospholipid issues then I have explained to the patient that I would go ahead and treat her for vestibular migraine and/or refer her to Psychiatry for non-neurologic and nonphysiologic etiology of what still sound like an odd presentation of vestibular issues.  I have noted that the patient has been sent for labs by her rheumatologist and notes that she is concerned about her overall health but there seems to be some cognitive disonance as the patient has skipped her lab appointment.   I will follow up with them in 2 month(s).  The patient verbalizes understanding and agreement with the treatment plan. I have discussed risks, benefits and alternatives to the treatment plan. Questions were sought and answered to her stated verbal satisfaction.        Dillon West MD    This note is dictated on Dragon Natural Speaking word recognition program. There are word recognition mistakes that are occasionally missed on review.

## 2018-06-13 ENCOUNTER — CLINICAL SUPPORT (OUTPATIENT)
Dept: REHABILITATION | Facility: HOSPITAL | Age: 63
End: 2018-06-13
Payer: MEDICARE

## 2018-06-13 DIAGNOSIS — Z74.09 DECREASED MOBILITY AND ENDURANCE: ICD-10-CM

## 2018-06-13 DIAGNOSIS — R53.1 DECREASED STRENGTH: ICD-10-CM

## 2018-06-13 DIAGNOSIS — M25.669 DECREASED RANGE OF MOTION (ROM) OF KNEE: ICD-10-CM

## 2018-06-13 PROCEDURE — 97110 THERAPEUTIC EXERCISES: CPT | Mod: PN

## 2018-06-13 NOTE — PROGRESS NOTES
"DAILY TREATMENT NOTE    DATE: 6/13/2018    Start Time:  1100  Stop Time:  1205    PROCEDURES:    TIMED  Procedure Min.   Therex  60         UNTIMED  Procedure Min.   Bike 5         Total Timed Minutes:  60  Total Timed Units:  4  Total Untimed Units:  0  Charges Billed/# of units:  4 TE      Progress/Current Status    Subjective:     Patient ID: Mary Choi is a 62 y.o. female.  Diagnosis:   1. Decreased range of motion (ROM) of knee     2. Decreased strength     3. Decreased mobility and endurance       Patient reports "no knee pain right now, I get some numbness/tenderness and feeling like its going to break;  but my left hip has been killing me.(2/10 now).  I also have problems with my balance sometimes."   Multiple complaints of other medical issues. Requests to try squats today.  "I noticed I can't squat down.  Can I work on squatting."    Objective:     Christienet initiated session on bike x 5 minutes for B LE ROM and joint nutrition, supervised.  Patient then performed all therex as per log with 1:1 by PTA x 60 including instruction of then performance of added therex as noted. Required several short breaks between therex due to complaint of LE "heavy".    DATE 6/13/18 6/5/18 5/24/2018   VISIT 3/12 2/12 1/12   POC 7/24/18       G CODE 3/10 2/10 1/10   FOTO 3/5 2/5 1/5   Cap Visit  Cap Total 121.28  355.76 121.28  234.48 113.20  113.20   Bike 5' 5'     MHP  --     MT  --     Stretches:       HS stretch 30"x3 B w/strap 30"x2 B w/strap + stairs 1x30'' R   IT band stretch       Supine:       Quad sets 5"x10 B 5"x10 B 5x5'' R   SLR 4x5 R  2x10 L 2x10 B     SAQ       Knee flexion stretch       Heel slides 1x10 R 1x10 R 5x R   SL hip abd 2x10 B 2x10 B 4x R   Clams       Hip add w/bridges      Bridges 5" 2x10 w/ball 2x10 10x           Prone:       Quad stretch       HS curls               Seated:       LAQ oot 2x10 B     Marching oot 2x10 B              Standing:       Heel raises 2x10 DL next     Calf stretch on " "fitter 30"x3 DL  stairs      Mini squats // 2x5     Steamboats 2x10 B  abd/march next     TKEs       SLS       Tandem walking       Cybex HS curls       Cybex LAQ       Leg Press NEXT      CP       INITIALS DH 2/6 DH 1/6 SHAUN       Assessment:     Reports "heaviness and difficulty" with R SLR, able to complete with modification of reps.  Multiple intermittent reports of difficulty with all therex, but able to complete with increased activity with encouragement.  Reports "Less tightness now" after treatment.      Patient Education/Response:     Patient educated to continue HEP.  Verbalized understanding.    Plans and Goals:     Continue PT per POC, progress as able.    Short Term Goals:  4 weeks  1.Report decreased B knee pain </=5/10 with moderate activities to increase tolerance for ADLs and increased functional mobility.  2. Increase B knee AROM to 0-120 in order to be able to perform ADLs without difficulty.  3. Increase strength by 1/3 MMT grade in BLE  to increase tolerance for ADL and work activities.  4. Pt to tolerate HEP to improve ROM and independence with ADL's.     Long Term Goals: 8 weeks  1.Report decreased B knee pain </= 2/10  to increase tolerance for ADLs.  2.Patient goal: Just to get better.  3.Increase strength to >/= 4+/5 in BLE to increase tolerance for ADL and work activities.  4. Pt will report at CK level (</= 55% impaired) on KNEE FOTO to demonstrate increase in LE function with every day tasks.   5. Pt will negotiate 8 steps and ambulate community distances at >/= Mod I for increased functional mobility.  6. Increase B knee AROM to 0-130 in order to be able to perform ADLs without difficulty.     "

## 2018-06-14 ENCOUNTER — CLINICAL SUPPORT (OUTPATIENT)
Dept: REHABILITATION | Facility: HOSPITAL | Age: 63
End: 2018-06-14
Payer: MEDICARE

## 2018-06-14 DIAGNOSIS — R53.1 DECREASED STRENGTH: ICD-10-CM

## 2018-06-14 DIAGNOSIS — Z74.09 DECREASED MOBILITY AND ENDURANCE: ICD-10-CM

## 2018-06-14 DIAGNOSIS — M25.669 DECREASED RANGE OF MOTION (ROM) OF KNEE: ICD-10-CM

## 2018-06-14 PROCEDURE — 97110 THERAPEUTIC EXERCISES: CPT | Mod: PN

## 2018-06-14 NOTE — PROGRESS NOTES
"DAILY TREATMENT NOTE    DATE: 6/14/2018    Start Time:  1:00 PM  Stop Time:  2:00 PM    PROCEDURES:    TIMED  Procedure Min.   Therex  25         UNTIMED  Procedure Min.   Bike 5         Total Timed Minutes:  25  Total Timed Units:  2  Total Untimed Units:  0  Charges Billed/# of units:  2 TE      Progress/Current Status    Subjective:     Patient ID: Mary Choi is a 62 y.o. female.  Diagnosis:   1. Decreased range of motion (ROM) of knee     2. Decreased strength     3. Decreased mobility and endurance       Pain: 2/10  Pt reports that her knees do still bother her and has been doing exercises at home. She wants to be able to leave the house more.    Objective:     Patinet initiated session on bike x 5 minutes for B LE ROM and joint nutrition, supervised.  Patient then performed therex as per log with 1:1 by PT x 25. Pt then performed supervised TE x 30 mins per log below.    DATE 6/14/18 6/13/18 6/5/18 5/24/2018   VISIT 4/12 3/12 2/12 1/12   POC 7/24/18        G CODE 4/10 3/10 2/10 1/10   FOTO 4/5 3/5 2/5 1/5   Cap Visit  Cap Total 60.64  416.40 121.28  355.76 121.28  234.48 113.20  113.20   Bike 5' 5' 5'     MHP   --     MT   --     Stretches:        HS stretch 3x30'' B w/ strap 30"x3 B w/strap 30"x2 B w/strap + stairs 1x30'' R   IT band stretch        Supine:        Quad sets 5''x15 B towel under knee 5"x10 B 5"x10 B 5x5'' R   SLR 2x10 B  Poor SLR height 4x5 R  2x10 L 2x10 B     SAQ        Knee flexion stretch        Heel slides 10x B 1x10 R 1x10 R 5x R   SL hip abd 2x10 B 2x10 B 2x10 B 4x R   Clams        Hip add W/ bridges w/bridges      Bridges 5'' 2x10 w/ hip add 5" 2x10 w/ball 2x10 10x            Prone:        Quad stretch        HS curls                 Seated:        LAQ oot oot 2x10 B     Marching oot oot 2x10 B               Standing:        Heel raises 2x10 DL 2x10 DL next     Calf stretch on fitter 3x30'' DL 30"x3 DL  stairs      Mini squats NT // 2x5     Steamboats oot 2x10 B  abd/march next "   TKEs        SLS        Tandem walking        Cybex HS curls        Cybex LAQ        Leg Press 2x10 DL  3.5 plates NEXT      CP        INITIALS SHAUN DH 2/6 DH 1/6 SHAUN       Assessment:     Pt required increased time for all activities, and encouragement to increase pace of activities. Pt demo poor SLR height and difficulty with all activities Consider moving to standing TE next visit.    Patient Education/Response:     Patient educated to continue HEP.  Verbalized understanding.    Plans and Goals:     Continue PT per POC, progress as able.    Short Term Goals:  4 weeks  1.Report decreased B knee pain </=5/10 with moderate activities to increase tolerance for ADLs and increased functional mobility.   2. Increase B knee AROM to 0-120 in order to be able to perform ADLs without difficulty.  3. Increase strength by 1/3 MMT grade in BLE  to increase tolerance for ADL and work activities.  4. Pt to tolerate HEP to improve ROM and independence with ADL's.     Long Term Goals: 8 weeks  1.Report decreased B knee pain </= 2/10  to increase tolerance for ADLs.  2.Patient goal: Just to get better.  3.Increase strength to >/= 4+/5 in BLE to increase tolerance for ADL and work activities.  4. Pt will report at CK level (</= 55% impaired) on KNEE FOTO to demonstrate increase in LE function with every day tasks.   5. Pt will negotiate 8 steps and ambulate community distances at >/= Mod I for increased functional mobility.  6. Increase B knee AROM to 0-130 in order to be able to perform ADLs without difficulty.

## 2018-06-16 LAB
ALDOLASE SERPL-CCNC: 5.2 U/L
ANA PAT SER IF-IMP: ABNORMAL
ANA SER QL IF: POSITIVE
ANA TITR SER IF: ABNORMAL TITER
CCP IGG SERPL-ACNC: <16 UNITS
CK SERPL-CCNC: 66 U/L (ref 29–143)
CRP SERPL-MCNC: 0.3 MG/L
ENA SS-A AB SER IA-ACNC: NORMAL AI
ENA SS-B AB SER IA-ACNC: NORMAL AI
RHEUMATOID FACT SERPL-ACNC: <14 IU/ML

## 2018-06-20 ENCOUNTER — HOSPITAL ENCOUNTER (OUTPATIENT)
Dept: RADIOLOGY | Facility: HOSPITAL | Age: 63
Discharge: HOME | End: 2018-06-20
Attending: PSYCHIATRY & NEUROLOGY
Payer: MEDICARE

## 2018-06-20 ENCOUNTER — CLINICAL SUPPORT (OUTPATIENT)
Dept: REHABILITATION | Facility: HOSPITAL | Age: 63
End: 2018-06-20
Payer: MEDICARE

## 2018-06-20 ENCOUNTER — HOSPITAL ENCOUNTER (OUTPATIENT)
Dept: NEUROLOGY | Facility: CLINIC | Age: 63
Discharge: HOME | End: 2018-06-20
Payer: MEDICARE

## 2018-06-20 DIAGNOSIS — H81.90 VESTIBULAR DISORDER, UNSPECIFIED LATERALITY: ICD-10-CM

## 2018-06-20 DIAGNOSIS — Z74.09 DECREASED MOBILITY AND ENDURANCE: ICD-10-CM

## 2018-06-20 DIAGNOSIS — R53.1 DECREASED STRENGTH: ICD-10-CM

## 2018-06-20 DIAGNOSIS — R76.0 ANTIPHOSPHOLIPID ANTIBODY POSITIVE: ICD-10-CM

## 2018-06-20 DIAGNOSIS — M25.669 DECREASED RANGE OF MOTION (ROM) OF KNEE: ICD-10-CM

## 2018-06-20 DIAGNOSIS — G45.9 TRANSIENT CEREBRAL ISCHEMIA, UNSPECIFIED TYPE: ICD-10-CM

## 2018-06-20 PROCEDURE — 70544 MR ANGIOGRAPHY HEAD W/O DYE: CPT | Mod: TC,59

## 2018-06-20 PROCEDURE — 70551 MRI BRAIN STEM W/O DYE: CPT | Mod: TC

## 2018-06-20 PROCEDURE — 70551 MRI BRAIN STEM W/O DYE: CPT | Mod: 26,,, | Performed by: RADIOLOGY

## 2018-06-20 PROCEDURE — 97110 THERAPEUTIC EXERCISES: CPT | Mod: PN

## 2018-06-20 NOTE — PROGRESS NOTES
"DAILY TREATMENT NOTE    DATE: 6/20/2018    Start Time: 1405  Stop Time:  1504    PROCEDURES:    TIMED  Procedure Min.   TE supervised 24   TE 30     UNTIMED  Procedure Min.   Bike 5         Total Timed Minutes: 30  Total Timed Units:  2  Total Untimed Units: 2  Charges Billed/# of units: TE-2      Progress/Current Status    Subjective:     Patient ID: Mary Choi is a 62 y.o. female.  Diagnosis:   1. Decreased range of motion (ROM) of knee     2. Decreased strength     3. Decreased mobility and endurance       Pain: 0/10    Pt reports LE weakness more than pain. Pt reports she is doing better today as she did have pain last session.    Objective:     Bike supervised x 5' for B LE ROM and joint nutrition f/b TE supervised x 24' per log. TE 1:1 with PT x 30' per log. FOTO next.    DATE 6/20/18 6/14/18 6/13/18 6/5/18 5/24/2018   VISIT 5/12 4/12 3/12 2/12 1/12   POC 7/24/18         G CODE 5/10  4/10 3/10 2/10 1/10   FOTO 5/5 next 4/5 3/5 2/5 1/5   Cap Visit  Cap Total 60.64  477.04 60.64  416.40 121.28  355.76 121.28  234.48 113.20  113.20   Bike 5' 5' 5' 5'     MHP    --     MT    --     Stretches:         HS stretch 3x30'' B w/ strap 3x30'' B w/ strap 30"x3 B w/strap 30"x2 B w/strap + stairs 1x30'' R   IT band stretch                 Supine:         Quad sets 5"x15 R  10"x10 L 5''x15 B towel under knee 5"x10 B 5"x10 B 5x5'' R   SLR 2x10 B 2x10 B  Poor SLR height 4x5 R  2x10 L 2x10 B     SAQ         Knee flexion stretch         Heel slides 10x B with pillowcase over foot 10x B 1x10 R 1x10 R 5x R   SL hip abd 2x15 B 2x10 B 2x10 B 2x10 B 4x R   Clams         Hip add w/ 1st set of bridges W/ bridges w/bridges      Bridges 5''x10 w/ ball squeeze  5x10 w/o ball 5'' 2x10 w/ hip add 5" 2x10 w/ball 2x10 10x             Prone:         Quad stretch         HS curls                   Seated:         LAQ  oot oot 2x10 B     Marching  oot oot 2x10 B                Standing:         Heel raises 2x15 DL 2x10 DL 2x10 DL next   " "  Calf stretch on fitter L1 3x30" DL 3x30'' DL 30"x3 DL  stairs      Mini squats B/w mini and 9090  2x10 B UE sup in // NT // 2x5     Steamboats Resume next oot 2x10 B  abd/march next     TKEs         SLS         Tandem walking         Cybex HS curls         Cybex LAQ         Leg Press 3.5 2x10 DL 2x10 DL  3.5 plates NEXT      CP         INITIALS CC SHAUN DH 2/6 DH 1/6 SHAUN     Assessment:     Increased time to perform TE, VC to speed performance. Improved SLR height with tactile cues during initial performance. Ball squeeze removed during bridges as pt was experiencing cramps in adductors. Overall mod VC to perform stretches and exercises through pain free range.     Patient Education/Response:     Patient educated to continue HEP.  Verbalized understanding.    Plans and Goals:     Continue PT per POC, progress as able.    Short Term Goals:  4 weeks  1.Report decreased B knee pain </=5/10 with moderate activities to increase tolerance for ADLs and increased functional mobility.   2. Increase B knee AROM to 0-120 in order to be able to perform ADLs without difficulty.  3. Increase strength by 1/3 MMT grade in BLE  to increase tolerance for ADL and work activities.  4. Pt to tolerate HEP to improve ROM and independence with ADL's.     Long Term Goals: 8 weeks  1.Report decreased B knee pain </= 2/10  to increase tolerance for ADLs.  2.Patient goal: Just to get better.  3.Increase strength to >/= 4+/5 in BLE to increase tolerance for ADL and work activities.  4. Pt will report at CK level (</= 55% impaired) on KNEE FOTO to demonstrate increase in LE function with every day tasks.   5. Pt will negotiate 8 steps and ambulate community distances at >/= Mod I for increased functional mobility.  6. Increase B knee AROM to 0-130 in order to be able to perform ADLs without difficulty.     "

## 2018-06-21 ENCOUNTER — HOSPITAL ENCOUNTER (OUTPATIENT)
Dept: NEUROLOGY | Facility: CLINIC | Age: 63
Discharge: HOME | End: 2018-06-21
Payer: MEDICARE

## 2018-06-21 DIAGNOSIS — R76.0 ANTIPHOSPHOLIPID ANTIBODY POSITIVE: ICD-10-CM

## 2018-06-21 DIAGNOSIS — G45.9 TRANSIENT CEREBRAL ISCHEMIA, UNSPECIFIED TYPE: ICD-10-CM

## 2018-06-21 DIAGNOSIS — H81.90 VESTIBULAR DISORDER, UNSPECIFIED LATERALITY: ICD-10-CM

## 2018-06-21 PROCEDURE — 95816 PR EEG,W/AWAKE & DROWSY RECORD: ICD-10-PCS | Mod: S$GLB,,, | Performed by: PSYCHIATRY & NEUROLOGY

## 2018-06-21 PROCEDURE — 95816 EEG AWAKE AND DROWSY: CPT | Mod: S$GLB,,, | Performed by: PSYCHIATRY & NEUROLOGY

## 2018-06-22 NOTE — PROCEDURES
DATE OF SERVICE:  06/21/2018    DURATION:  27 minutes and 30 seconds.    ELECTROENCEPHALOGRAM REPORT    METHODOLOGY:  Electroencephalographic (EEG) recording is recorded with   electrodes placed according to the International 10-20 placement system.  Thirty   two (32) channels of digital signal (sampling rate of 512/sec), including T1   and T2, were simultaneously recorded from the scalp and may include EKG, EMG,   and/or eye monitors.  Recording band pass was 0.1 to 512 Hz.  Digital video   recording of the patient is simultaneously recorded with the EEG.  The patient   is instructed to report clinical symptoms which may occur during the recording   session.  EEG and video recording are stored and archived in digital format.    Activation procedures, which include photic stimulation, hyperventilation and   instructing patients to perform simple tasks, are done in selected patients.    The EEG is displayed on a monitor screen and can be reviewed using different   montages.  Computer assisted-analysis is employed to detect spike and   electrographic seizure activity.  The entire record is submitted for computer   analysis.  The entire recording is visually reviewed, and the times identified   by computer analysis as being spikes or seizures are reviewed again.    Compressed spectral analysis (CSA) is also performed on the activity recorded   from each individual channel.  This is displayed as a power display of   frequencies from 0 to 30 Hz over time.  The CSA is reviewed looking for   asymmetries in power between homologous areas of the scalp, then compared with   the original EEG recording.    [employer] software was also utilized in the review of this study.  This software   suite analyzes the EEG recording in multiple domains.  Coherence and rhythmicity   are computed to identify EEG sections which may contain organized seizures.    Each channel undergoes analysis to detect the presence of spike and sharp waves    which have special and morphological characteristics of epileptic activity.  The   routine EEG recording is converted from special into frequency domain.  This is   then displayed comparing homologous areas to identify areas of significant   asymmetry.  Algorithm to identify non-cortically generated artifact is used to   separate artifact from the EEG.    EEG FINDINGS:  This record contains medium amplitude activity with a mix of   alpha and beta activity and a well-formed, well-modulated posterior dominant   rhythm of 10 Hz was best seen in the occipital channels.  Hyperventilation is   performed early on and causes a breakup of the alpha rhythm and some mild   diffuse slowing.  Photic stimulation is then performed, which reveals bilateral   symmetrical occipital driving with some harmonic driving response as well, but   all normal.  As the record progresses, mixed frequency activity with abundant   movement artifact and eye blink artifact is seen in that point forward.  There   are no epileptiform discharges or seizures.  Drowsiness is seen, but stage II   sleep is not.  No other remarkable findings are seen.    INTERPRETATION:  Normal awake and drowsy EEG.      NBB/IN  dd: 06/21/2018 16:48:36 (CDT)  td: 06/21/2018 17:03:56 (CDT)  Doc ID   #[id number]  Job ID #[id number]    CC:

## 2018-06-25 ENCOUNTER — TELEPHONE (OUTPATIENT)
Dept: RHEUMATOLOGY | Facility: CLINIC | Age: 63
End: 2018-06-25

## 2018-06-25 NOTE — TELEPHONE ENCOUNTER
Labs on June 13, 2018 quest  CPK 66 normal  Aldolase 5.2 normal  SSA negative  SSB negative  CCP less than 16 normal  Rheumatoid factor less than 14 normal  C reactive protein 0.3 (normal less than 8)  VASILE positive 1-80 in a homogeneous pattern (low antibody level)    Lab similar to those done in the past previously VASILE titer was 1-320 homogeneous.  Will monitor patient.  Staff to inform patient that there are no changes in her labs when compared to those from the past

## 2018-06-26 ENCOUNTER — OFFICE VISIT (OUTPATIENT)
Dept: ORTHOPEDICS | Facility: CLINIC | Age: 63
End: 2018-06-26
Payer: MEDICARE

## 2018-06-26 VITALS — HEIGHT: 61 IN | BODY MASS INDEX: 27.3 KG/M2 | WEIGHT: 144.63 LBS

## 2018-06-26 DIAGNOSIS — M70.62 TROCHANTERIC BURSITIS OF LEFT HIP: ICD-10-CM

## 2018-06-26 PROCEDURE — 99999 PR PBB SHADOW E&M-EST. PATIENT-LVL III: CPT | Mod: PBBFAC,,, | Performed by: NURSE PRACTITIONER

## 2018-06-26 PROCEDURE — 3008F BODY MASS INDEX DOCD: CPT | Mod: CPTII,S$GLB,, | Performed by: NURSE PRACTITIONER

## 2018-06-26 PROCEDURE — 20610 DRAIN/INJ JOINT/BURSA W/O US: CPT | Mod: LT,S$GLB,, | Performed by: NURSE PRACTITIONER

## 2018-06-26 PROCEDURE — 99213 OFFICE O/P EST LOW 20 MIN: CPT | Mod: 25,S$GLB,, | Performed by: NURSE PRACTITIONER

## 2018-06-26 RX ADMIN — TRIAMCINOLONE ACETONIDE 40 MG: 40 INJECTION, SUSPENSION INTRA-ARTICULAR; INTRAMUSCULAR at 08:06

## 2018-06-26 NOTE — PROGRESS NOTES
CC: Pain of the Left Hip      HPI: Pt with left hip pain which is lateral and aching for over a year. She has been seen in clinic previously and wanted to try voltaren gel, but that was too expensive so she hasn't taken anything. She is ready to consider the cortisone injection that we discussed at her last visit. She is ambulating without assistive device. There is not a limp.    ROS  General: denies fever and chills  Resp: no c/o sob  CVS: no c/o cp  MSK: c/o left lateral hip pain which is aching and worse with increased activity and laying on the left side    PE  General: AAOx3, pleasant and cooperative  Resp: respirations even and unlabored  [facility]  - [facility]  - pain with internal rotation  - pain with external rotation  + pain over the greater trochanter    Xray:  Reviewed by me:No fracture dislocation bone destruction seen. There is mild DJD     Assessment:  Left hip trochanteric bursitis    Plan:  Left hip cortisone injection for trochanteric bursitis today  nsaids prn  F/u as needed    Greater Trochanter Bursa Injection Procedure Note   Diagnosis: left greater trochanteric bursitis  Indications: left hip pain  Procedure Details: Verbal consent was obtained for the procedure. The injection site was identified and the skin was prepared with alcohol. The left hip was injected from a lateral approach with 1 ml of Kenalog and 3 ml Lidocaine under sterile technique using a 25 gauge 1 1/2 inch needle. The needle was removed and the area cleansed and dressed.  Complications:  Patient tolerated the procedure well.    she was advised to rest the leg today, using ice and Tylenol as needed for comfort and swelling. she may have an increase in discomfort tonight followed by steady improvement over the next several days. It may take 1-3 weeks following the injection to get the full benefit of the medication.

## 2018-06-27 ENCOUNTER — CLINICAL SUPPORT (OUTPATIENT)
Dept: REHABILITATION | Facility: HOSPITAL | Age: 63
End: 2018-06-27
Payer: MEDICARE

## 2018-06-27 DIAGNOSIS — M25.669 DECREASED RANGE OF MOTION (ROM) OF KNEE: ICD-10-CM

## 2018-06-27 DIAGNOSIS — Z74.09 DECREASED MOBILITY AND ENDURANCE: ICD-10-CM

## 2018-06-27 DIAGNOSIS — R53.1 DECREASED STRENGTH: ICD-10-CM

## 2018-06-27 PROCEDURE — 97110 THERAPEUTIC EXERCISES: CPT | Mod: PN

## 2018-06-27 NOTE — PROGRESS NOTES
"DAILY TREATMENT NOTE    DATE: 6/27/2018    Start Time: 1:00  Stop Time:  1:50    PROCEDURES:    TIMED  Procedure Min.       TE 40     UNTIMED  Procedure Min.             Total Timed Minutes: 40  Total Timed Units:  3  Total Untimed Units:   Charges Billed/# of units: 3      Progress/Current Status    Subjective:     Patient ID: Mary Choi is a 62 y.o. female.  Diagnosis:   1. Decreased range of motion (ROM) of knee     2. Decreased strength     3. Decreased mobility and endurance       Pain: Pt reported 3,4/10 pain in L hip "  It's a dull achy pain when I'm walking".   .    Objective:     Pt innitiated session with neuro re-ed and endurance training with B extremities for reciprocal motion of all limbs on sci-fit x 5 min at level 1.5 at > or equal to 50 spm w/o rest.  Ended session with therex as per logged below 1:1 by SPTA under direct supervision of Pramod Sterling PTA   supervised x 40'.     DATE 6/27/18 6/20/18 6/14/18 6/13/18 6/5/18 5/24/2018   VISIT 6/12 5/12 4/12 3/12 2/12 1/12   POC 7/24/18          G CODE 6/10 5/10  4/10 3/10 2/10 1/10   FOTO DONE 5/5 next 4/5 3/5 2/5 1/5   Cap Visit  Cap Total 90.96  568.00 60.64  477.04 60.64  416.40 121.28  355.76 121.28  234.48 113.20  113.20   Bike 5' 5' 5' 5' 5'     MHP     --     MT     --     Stretches:          HS stretch 3x30'' B w/ strap 3x30'' B w/ strap 3x30'' B w/ strap 30"x3 B w/strap 30"x2 B w/strap + stairs 1x30'' R   IT band stretch                   Supine:          Quad sets 5"x15 R  10"x10 L 5"x15 R  10"x10 L 5''x15 B towel under knee 5"x10 B 5"x10 B 5x5'' R   SLR 2x10 B 2x10 B 2x10 B  Poor SLR height 4x5 R  2x10 L 2x10 B     SAQ          Knee flexion stretch          Heel slides 10x B with pillowcase over foot 10x B with pillowcase over foot 10x B 1x10 R 1x10 R 5x R   SL hip abd  2x15 B 2x10 B 2x10 B 2x10 B 4x R   Clams          Hip add w/ 1st set of bridges w/ 1st set of bridges W/ bridges w/bridges      Bridges 5''x10 w/ ball squeeze  5x10 w/o " "ball 5''x10 w/ ball squeeze  5x10 w/o ball 5'' 2x10 w/ hip add 5" 2x10 w/ball 2x10 10x              Prone:          Quad stretch          HS curls                     Seated:          LAQ oot  oot oot 2x10 B     Marching oot  oot oot 2x10 B                 Standing:          Heel raises 2x15 DL 2x15 DL 2x10 DL 2x10 DL next     Calf stretch on fitter  3x30" L1 3x30" DL 3x30'' DL 30"x3 DL  stairs      Mini squats nt B/w mini and 9090  2x10 B UE sup in // NT // 2x5     Steamboats 2 x 10 Resume next oot 2x10 B  abd/march next     TKEs          SLS          Tandem walking          Cybex HS curls          Cybex LAQ          Leg Press 3.5 2x10 DL 3.5 2x10 DL 2x10 DL  3.5 plates NEXT      CP          INITIALS TL/JA 1/6 CC SHAUN DH 2/6 DH 1/6 SHAUN     Assessment:     Pt requires verbal instructions during therex for proper technique and motivation. Ms. Choi requires several breaks due to sensation of "cramping" in lower L extremity.  Pt had no complaints of increased pain following session.      Plans and Goals:     Continue PT per POC, progress as able.    Short Term Goals:  4 weeks  1.Report decreased B knee pain </=5/10 with moderate activities to increase tolerance for ADLs and increased functional mobility.   2. Increase B knee AROM to 0-120 in order to be able to perform ADLs without difficulty.  3. Increase strength by 1/3 MMT grade in BLE  to increase tolerance for ADL and work activities.  4. Pt to tolerate HEP to improve ROM and independence with ADL's.     Long Term Goals: 8 weeks  1.Report decreased B knee pain </= 2/10  to increase tolerance for ADLs.  2.Patient goal: Just to get better.  3.Increase strength to >/= 4+/5 in BLE to increase tolerance for ADL and work activities.  4. Pt will report at CK level (</= 55% impaired) on KNEE FOTO to demonstrate increase in LE function with every day tasks.   5. Pt will negotiate 8 steps and ambulate community distances at >/= Mod I for increased functional mobility.  6. " Increase B knee AROM to 0-130 in order to be able to perform ADLs without difficulty.    Session conducted by Ramesh Cooley under direct supervision of Pramod Sterling PTA

## 2018-06-28 ENCOUNTER — CLINICAL SUPPORT (OUTPATIENT)
Dept: REHABILITATION | Facility: HOSPITAL | Age: 63
End: 2018-06-28
Payer: MEDICARE

## 2018-06-28 DIAGNOSIS — M25.669 DECREASED RANGE OF MOTION (ROM) OF KNEE: ICD-10-CM

## 2018-06-28 DIAGNOSIS — R53.1 DECREASED STRENGTH: ICD-10-CM

## 2018-06-28 DIAGNOSIS — Z74.09 DECREASED MOBILITY AND ENDURANCE: ICD-10-CM

## 2018-06-28 PROCEDURE — 97110 THERAPEUTIC EXERCISES: CPT | Mod: PN

## 2018-06-28 RX ORDER — TRIAMCINOLONE ACETONIDE 40 MG/ML
40 INJECTION, SUSPENSION INTRA-ARTICULAR; INTRAMUSCULAR
Status: COMPLETED | OUTPATIENT
Start: 2018-06-26 | End: 2018-06-26

## 2018-06-28 NOTE — PROGRESS NOTES
"DAILY TREATMENT NOTE    DATE: 6/28/2018    Start Time: 4:00  Stop Time:  5:00    PROCEDURES:    TIMED  Procedure Min.       TE 25     UNTIMED  Procedure Min.   Bike 5   Supervised TE 30     Total Timed Minutes: 25  Total Timed Units:  2  Total Untimed Units: 0  Charges Billed/# of units: 2 TE      Progress/Current Status    Subjective:     Patient ID: Mary Choi is a 62 y.o. female.  Diagnosis:   1. Decreased range of motion (ROM) of knee     2. Decreased strength     3. Decreased mobility and endurance       Pain: Pt reported 3/10 pain in L hip and L knee  .  Objective:     Pt performed bike supervised x 5 mins f/b TE 1:1 with PT x 25 mins per log below. Pt then performed supervised TE x 30 mins per log below.    DATE 6/28/18 6/27/18 6/20/18 6/14/18 6/13/18 6/5/18 5/24/2018   VISIT 7/12 6/12 5/12 4/12 3/12 2/12 1/12   POC 7/24/18           G CODE 7/10 6/10 5/10  4/10 3/10 2/10 1/10   FOTO 7/10 DONE 5/5 next 4/5 3/5 2/5 1/5   Cap Visit  Cap Total 60.64  628.64 90.96  568.00 60.64  477.04 60.64  416.40 121.28  355.76 121.28  234.48 113.20  113.20   Bike 5' 5' 5' 5' 5' 5'     MHP      --     MT      --     Stretches:           HS stretch 3x30'' B 3x30'' B w/ strap 3x30'' B w/ strap 3x30'' B w/ strap 30"x3 B w/strap 30"x2 B w/strap + stairs 1x30'' R   IT band stretch                     Supine:           Quad sets 10x10'' B 5"x15 R  10"x10 L 5"x15 R  10"x10 L 5''x15 B towel under knee 5"x10 B 5"x10 B 5x5'' R   SLR 2x10 B  Min A for full ROM 2x10 B 2x10 B 2x10 B  Poor SLR height 4x5 R  2x10 L 2x10 B     SAQ           Knee flexion stretch           Heel slides refused 10x B with pillowcase over foot 10x B with pillowcase over foot 10x B 1x10 R 1x10 R 5x R   SL hip abd   2x15 B 2x10 B 2x10 B 2x10 B 4x R   Clams           Hip add refused w/ 1st set of bridges w/ 1st set of bridges W/ bridges w/bridges      Bridges refused 5''x10 w/ ball squeeze  5x10 w/o ball 5''x10 w/ ball squeeze  5x10 w/o ball 5'' 2x10 w/ hip add " "5" 2x10 w/ball 2x10 10x               Prone:           Quad stretch           HS curls                       Seated:           LAQ NT oot  oot oot 2x10 B     Marching NT oot  oot oot 2x10 B                  Standing:           Heel raises 2x15 DL 2x15 DL 2x15 DL 2x10 DL 2x10 DL next     Calf stretch on fitter 3x30''  3x30" L1 3x30" DL 3x30'' DL 30"x3 DL  stairs      Mini squats 2x10 in // nt B/w mini and 9090  2x10 B UE sup in // NT // 2x5     Steamboats 2x10 B 2 x 10 Resume next oot 2x10 B  abd/march next     TKEs           SLS           Tandem walking           Cybex HS curls           Cybex LAQ           Leg Press 4.0 2x10 DL 3.5 2x10 DL 3.5 2x10 DL 2x10 DL  3.5 plates NEXT      CP           INITIALS SHAUN TL/JA 1/6 CC SHAUN DH 2/6 DH 1/6 SHAUN     Assessment:     Pt required increased time due to pt reporting L upper thigh and knee burning that she needed to stand and shake her L leg out. Pt only able to lift heel off the mat about 3-4 inches on each leg, and then Min A to complete full SLR to about 45 degrees. Pt requires mod cues for all activities and increased time for completion. Pt compliant with standing activities and tolerated increased weight and reps on leg press today. Consider starting standing TE and progress from there.    Plans and Goals:     Continue PT per POC, progress as able.    Short Term Goals:  4 weeks  1.Report decreased B knee pain </=5/10 with moderate activities to increase tolerance for ADLs and increased functional mobility.   2. Increase B knee AROM to 0-120 in order to be able to perform ADLs without difficulty.  3. Increase strength by 1/3 MMT grade in BLE  to increase tolerance for ADL and work activities.  4. Pt to tolerate HEP to improve ROM and independence with ADL's.     Long Term Goals: 8 weeks  1.Report decreased B knee pain </= 2/10  to increase tolerance for ADLs.  2.Patient goal: Just to get better.  3.Increase strength to >/= 4+/5 in BLE to increase tolerance for ADL and " work activities.  4. Pt will report at CK level (</= 55% impaired) on KNEE FOTO to demonstrate increase in LE function with every day tasks.   5. Pt will negotiate 8 steps and ambulate community distances at >/= Mod I for increased functional mobility.  6. Increase B knee AROM to 0-130 in order to be able to perform ADLs without difficulty.    Session conducted by Ramesh Cooley under direct supervision of Sudarshan Hinton, PT

## 2018-07-10 ENCOUNTER — CLINICAL SUPPORT (OUTPATIENT)
Dept: REHABILITATION | Facility: HOSPITAL | Age: 63
End: 2018-07-10
Payer: MEDICARE

## 2018-07-10 DIAGNOSIS — M25.669 DECREASED RANGE OF MOTION (ROM) OF KNEE: ICD-10-CM

## 2018-07-10 DIAGNOSIS — R53.1 DECREASED STRENGTH: ICD-10-CM

## 2018-07-10 DIAGNOSIS — Z74.09 DECREASED MOBILITY AND ENDURANCE: ICD-10-CM

## 2018-07-10 PROCEDURE — 97110 THERAPEUTIC EXERCISES: CPT | Mod: PN

## 2018-07-10 NOTE — PROGRESS NOTES
"DAILY TREATMENT NOTE    DATE: 7/10/2018    Start Time: 1:00  Stop Time:  2:00    PROCEDURES:    TIMED  Procedure Min.       TE 25     UNTIMED  Procedure Min.   Bike 5   Supervised TE 30     Total Timed Minutes: 25  Total Timed Units:  2  Total Untimed Units: 0  Charges Billed/# of units: 2 TE      Progress/Current Status    Subjective:     Patient ID: Mary Choi is a 62 y.o. female.  Diagnosis:   1. Decreased range of motion (ROM) of knee     2. Decreased strength     3. Decreased mobility and endurance       Pain: Pt reported 3/10 pain in L hip and L knee  .  Objective:     Pt performed bike supervised x 5 mins f/b TE 1:1 with PT x 25 mins per log below. Pt then performed supervised TE x 30 mins per log below.    DATE 7/10/18 6/28/18 6/27/18 6/20/18 6/14/18 6/13/18 6/5/18 5/24/2018   VISIT 8/12 7/12 6/12 5/12 4/12 3/12 2/12 1/12   POC 7/24/18            G CODE 8/10 7/10 6/10 5/10  4/10 3/10 2/10 1/10   FOTO 8/10 7/10 DONE 5/5 next 4/5 3/5 2/5 1/5   Cap Visit  Cap Total 60.64  689.28 60.64  628.64 90.96  568.00 60.64  477.04 60.64  416.40 121.28  355.76 121.28  234.48 113.20  113.20   Bike 5' L3 5' 5' 5' 5' 5' 5'     MHP       --     MT       --     Stretches:            HS stretch 3x30" B 3x30'' B 3x30'' B w/ strap 3x30'' B w/ strap 3x30'' B w/ strap 30"x3 B w/strap 30"x2 B w/strap + stairs 1x30'' R   IT band stretch                       Supine:            Quad sets 10"x10 B 10x10'' B 5"x15 R  10"x10 L 5"x15 R  10"x10 L 5''x15 B towel under knee 5"x10 B 5"x10 B 5x5'' R   SLR 2x10 B AROM full  w/TA 2x10 B  Min A for full ROM 2x10 B 2x10 B 2x10 B  Poor SLR height 4x5 R  2x10 L 2x10 B     SAQ            Knee flexion stretch            Heel slides 10x10" B refused 10x B with pillowcase over foot 10x B with pillowcase over foot 10x B 1x10 R 1x10 R 5x R   SL hip abd    2x15 B 2x10 B 2x10 B 2x10 B 4x R   Clams            Hip add  refused w/ 1st set of bridges w/ 1st set of bridges W/ bridges w/bridges      Bridges " "2x10 B 3" hold refused 5''x10 w/ ball squeeze  5x10 w/o ball 5''x10 w/ ball squeeze  5x10 w/o ball 5'' 2x10 w/ hip add 5" 2x10 w/ball 2x10 10x                Prone:            Quad stretch            HS curls                         Seated:            LAQ  NT oot  oot oot 2x10 B     Marching  NT oot  oot oot 2x10 B                   Standing:            Heel raises 2x19 B 2x15 DL 2x15 DL 2x15 DL 2x10 DL 2x10 DL next     Calf stretch on fitter 3x30" B 3x30''  3x30" L1 3x30" DL 3x30'' DL 30"x3 DL  stairs      Mini squats 2x10 B 2x10 in // nt B/w mini and 9090  2x10 B UE sup in // NT // 2x5     Steamboats 2x10 B 2x10 B 2 x 10 Resume next oot 2x10 B  abd/march next     TKEs            SLS            Tandem walking            Cybex HS curls            Cybex LAQ            Leg Press 4.0 2x10 B 4.0 2x10 DL 3.5 2x10 DL 3.5 2x10 DL 2x10 DL  3.5 plates NEXT      CP            INITIALS MB 1/6 SHAUN TL/JA 1/6 CC SHAUN DH 2/6 DH 1/6 SHAUN     Assessment:     Pt tolerated well. Needs redirection. .Was able to perform full SLR with TA activation.Pt requires mod cues for all activities and increased time for completion. Pt compliant with standing activities and tolerated increased weight and reps on leg press today. Consider starting standing TE and progress from there.    Plans and Goals:     Continue PT per POC, progress as able.    Short Term Goals:  4 weeks  1.Report decreased B knee pain </=5/10 with moderate activities to increase tolerance for ADLs and increased functional mobility.   2. Increase B knee AROM to 0-120 in order to be able to perform ADLs without difficulty.  3. Increase strength by 1/3 MMT grade in BLE  to increase tolerance for ADL and work activities.  4. Pt to tolerate HEP to improve ROM and independence with ADL's.     Long Term Goals: 8 weeks  1.Report decreased B knee pain </= 2/10  to increase tolerance for ADLs.  2.Patient goal: Just to get better.  3.Increase strength to >/= 4+/5 in BLE to increase " tolerance for ADL and work activities.  4. Pt will report at CK level (</= 55% impaired) on KNEE FOTO to demonstrate increase in LE function with every day tasks.   5. Pt will negotiate 8 steps and ambulate community distances at >/= Mod I for increased functional mobility.  6. Increase B knee AROM to 0-130 in order to be able to perform ADLs without difficulty.    Session conducted by Ramesh Cooley under direct supervision of Fazal Paz PTA

## 2018-07-12 ENCOUNTER — CLINICAL SUPPORT (OUTPATIENT)
Dept: REHABILITATION | Facility: HOSPITAL | Age: 63
End: 2018-07-12
Payer: MEDICARE

## 2018-07-12 DIAGNOSIS — Z74.09 DECREASED MOBILITY AND ENDURANCE: ICD-10-CM

## 2018-07-12 DIAGNOSIS — M25.669 DECREASED RANGE OF MOTION (ROM) OF KNEE: ICD-10-CM

## 2018-07-12 DIAGNOSIS — R53.1 DECREASED STRENGTH: ICD-10-CM

## 2018-07-12 PROCEDURE — 97110 THERAPEUTIC EXERCISES: CPT | Mod: PN

## 2018-07-12 NOTE — PROGRESS NOTES
"DAILY TREATMENT NOTE    DATE: 7/12/2018    Start Time: 1:00  Stop Time:  2:00    PROCEDURES:    TIMED  Procedure Min.       TE 55     UNTIMED  Procedure Min.   Bike 5   Supervised TE      Total Timed Minutes: 55  Total Timed Units:  4  Total Untimed Units: 0  Charges Billed/# of units: 4 TE      Progress/Current Status    Subjective:     Patient ID: Mary Choi is a 62 y.o. female.  Diagnosis:   1. Decreased range of motion (ROM) of knee     2. Decreased strength     3. Decreased mobility and endurance       Pain: Pt reported 3/10 pain in L hip and L knee  .  Objective:     Pt performed bike supervised x 5 mins f/b TE 1:1 with PT x 55 mins per log below.     DATE 7/12/18 7/10/18 6/28/18 6/27/18 6/20/18 6/14/18 6/13/18 6/5/18 5/24/2018   VISIT 9/12 8/12 7/12 6/12 5/12 4/12 3/12 2/12 1/12   POC 7/24/18             G CODE 9/10 8/10 7/10 6/10 5/10  4/10 3/10 2/10 1/10   FOTO 9/10 8/10 7/10 DONE 5/5 next 4/5 3/5 2/5 1/5   Cap Visit  Cap Total 121.28  810.56 60.64  689.28 60.64  628.64 90.96  568.00 60.64  477.04 60.64  416.40 121.28  355.76 121.28  234.48 113.20  113.20   Bike 5' L3 5' L3 5' 5' 5' 5' 5' 5'     MHP        --     MT        --     Stretches:             HS stretch 3x30'' B 3x30" B 3x30'' B 3x30'' B w/ strap 3x30'' B w/ strap 3x30'' B w/ strap 30"x3 B w/strap 30"x2 B w/strap + stairs 1x30'' R   IT band stretch                         Supine:             Quad sets D/c 10"x10 B 10x10'' B 5"x15 R  10"x10 L 5"x15 R  10"x10 L 5''x15 B towel under knee 5"x10 B 5"x10 B 5x5'' R   SLR 3x10 B w/ TA 2x10 B AROM full  w/TA 2x10 B  Min A for full ROM 2x10 B 2x10 B 2x10 B  Poor SLR height 4x5 R  2x10 L 2x10 B     SAQ             Knee flexion stretch             Heel slides NT 10x10" B refused 10x B with pillowcase over foot 10x B with pillowcase over foot 10x B 1x10 R 1x10 R 5x R   SL hip abd 2x15 B    2x15 B 2x10 B 2x10 B 2x10 B 4x R   Clams             Hip add   refused w/ 1st set of bridges w/ 1st set of bridges W/ " "bridges w/bridges      Bridges 3x10 B 2x10 B 3" hold refused 5''x10 w/ ball squeeze  5x10 w/o ball 5''x10 w/ ball squeeze  5x10 w/o ball 5'' 2x10 w/ hip add 5" 2x10 w/ball 2x10 10x                 Prone:             Quad stretch             HS curls 3x10 B  1# next                          Seated:             LAQ   NT oot  oot oot 2x10 B     Marching   NT oot  oot oot 2x10 B                    Standing:             Heel raises 3x10 DL  Off // curb 2x15 B 2x15 DL 2x15 DL 2x15 DL 2x10 DL 2x10 DL next     Calf stretch on fitter 3x30'' B 3x30" B 3x30''  3x30" L1 3x30" DL 3x30'' DL 30"x3 DL  stairs      Mini squats 2x10 B 2x10 B 2x10 in // nt B/w mini and 9090  2x10 B UE sup in // NT // 2x5     Steamboats NT 2x10 B 2x10 B 2 x 10 Resume next oot 2x10 B  abd/march next     TKEs             SLS             Tandem walking             Cybex HS curls             Cybex LAQ             Leg Press 4.0 3x10 DL 4.0 2x10 B 4.0 2x10 DL 3.5 2x10 DL 3.5 2x10 DL 2x10 DL  3.5 plates NEXT      CP             INITIALS SHAUN MB 1/6 SHAUN TL/JA 1/6 CC SHAUN DH 2/6 DH 1/6 SHAUN     Assessment:     Min cues for form/techniques for all activities today; pt did tolerate increased reps and weight today well with no pain. Pt reports increased B quad strain that feels more as pain for her at times; education needed and pt would benefit from quad stretching B per pt tolerance.    Plans and Goals:     Continue PT per POC, progress as able.    Short Term Goals:  4 weeks  1.Report decreased B knee pain </=5/10 with moderate activities to increase tolerance for ADLs and increased functional mobility.   2. Increase B knee AROM to 0-120 in order to be able to perform ADLs without difficulty.  3. Increase strength by 1/3 MMT grade in BLE  to increase tolerance for ADL and work activities.  4. Pt to tolerate HEP to improve ROM and independence with ADL's.     Long Term Goals: 8 weeks  1.Report decreased B knee pain </= 2/10  to increase tolerance for " ADLs.  2.Patient goal: Just to get better.  3.Increase strength to >/= 4+/5 in BLE to increase tolerance for ADL and work activities.  4. Pt will report at CK level (</= 55% impaired) on KNEE FOTO to demonstrate increase in LE function with every day tasks.   5. Pt will negotiate 8 steps and ambulate community distances at >/= Mod I for increased functional mobility.  6. Increase B knee AROM to 0-130 in order to be able to perform ADLs without difficulty.    Session conducted by Ramesh Cooley under direct supervision of Sudarshan Hinton, PT

## 2018-07-17 ENCOUNTER — CLINICAL SUPPORT (OUTPATIENT)
Dept: REHABILITATION | Facility: HOSPITAL | Age: 63
End: 2018-07-17
Payer: MEDICARE

## 2018-07-17 DIAGNOSIS — M25.669 DECREASED RANGE OF MOTION (ROM) OF KNEE: ICD-10-CM

## 2018-07-17 DIAGNOSIS — Z74.09 DECREASED MOBILITY AND ENDURANCE: ICD-10-CM

## 2018-07-17 DIAGNOSIS — R53.1 DECREASED STRENGTH: ICD-10-CM

## 2018-07-17 PROCEDURE — 97110 THERAPEUTIC EXERCISES: CPT | Mod: PN

## 2018-07-17 NOTE — PROGRESS NOTES
"DAILY TREATMENT NOTE    DATE: 7/17/2018    Start Time: 12:00  Stop Time:  1:00    PROCEDURES:    TIMED  Procedure Min.       TE 30     UNTIMED  Procedure Min.   Bike 5   Supervised TE 25     Total Timed Minutes: 55  Total Timed Units:  2  Total Untimed Units: 0  Charges Billed/# of units: 2 TE      Progress/Current Status    Subjective:     Patient ID: Mary Choi is a 62 y.o. female.  Diagnosis:   1. Decreased range of motion (ROM) of knee     2. Decreased strength     3. Decreased mobility and endurance       Pain: Pt reported 3/10 pain in L hip and L knee  .  Objective:     Pt performed bike supervised x 5 mins f/b TE 1:1 with PT x 55 mins per log below.     DATE 7/17/18 7/12/18 7/10/18 6/28/18 6/27/18 6/20/18 6/14/18 6/13/18 6/5/18 5/24/2018   VISIT 10/12 9/12 8/12 7/12 6/12 5/12 4/12 3/12 2/12 1/12   POC 7/24/18              G CODE 10/10 9/10 8/10 7/10 6/10 5/10  4/10 3/10 2/10 1/10   FOTO DONE 10 9/10 8/10 7/10 DONE 5/5 next 4/5 3/5 2/5 1/5   Cap Visit  Cap Total 60.64  871.20 121.28  810.56 60.64  689.28 60.64  628.64 90.96  568.00 60.64  477.04 60.64  416.40 121.28  355.76 121.28  234.48 113.20  113.20   Bike 5' L4 5' L3 5' L3 5' 5' 5' 5' 5' 5'     MHP         --     MT         --     Stretches:              HS stretch 3x30" B 3x30'' B 3x30" B 3x30'' B 3x30'' B w/ strap 3x30'' B w/ strap 3x30'' B w/ strap 30"x3 B w/strap 30"x2 B w/strap + stairs 1x30'' R   IT band stretch                           Supine:              Quad sets  D/c 10"x10 B 10x10'' B 5"x15 R  10"x10 L 5"x15 R  10"x10 L 5''x15 B towel under knee 5"x10 B 5"x10 B 5x5'' R   SLR 3x10 B 3x10 B w/ TA 2x10 B AROM full  w/TA 2x10 B  Min A for full ROM 2x10 B 2x10 B 2x10 B  Poor SLR height 4x5 R  2x10 L 2x10 B     SAQ              Knee flexion stretch              Heel slides  NT 10x10" B refused 10x B with pillowcase over foot 10x B with pillowcase over foot 10x B 1x10 R 1x10 R 5x R   SL hip abd 2x15 B 2x15 B    2x15 B 2x10 B 2x10 B 2x10 B 4x R " "  Clams              Hip add    refused w/ 1st set of bridges w/ 1st set of bridges W/ bridges w/bridges      Bridges 3x10 B 3x10 B 2x10 B 3" hold refused 5''x10 w/ ball squeeze  5x10 w/o ball 5''x10 w/ ball squeeze  5x10 w/o ball 5'' 2x10 w/ hip add 5" 2x10 w/ball 2x10 10x                  Prone:              Quad stretch              HS curls 3x10 1.5 # B 3x10 B  1# next                           Seated:              LAQ    NT oot  oot oot 2x10 B     Marching    NT oot  oot oot 2x10 B                     Standing:              Heel raises 3x10 B  Off curb 3x10 DL  Off // curb 2x15 B 2x15 DL 2x15 DL 2x15 DL 2x10 DL 2x10 DL next     Calf stretch on fitter 3x30" B 3x30'' B 3x30" B 3x30''  3x30" L1 3x30" DL 3x30'' DL 30"x3 DL  stairs      Mini squats 2x10 B 2x10 B 2x10 B 2x10 in // nt B/w mini and 9090  2x10 B UE sup in // NT // 2x5     Steamboats  NT 2x10 B 2x10 B 2 x 10 Resume next oot 2x10 B  abd/march next     TKEs              SLS              Tandem walking 3 laps intermittent UE sup             Cybex HS curls              Cybex LAQ              Leg Press 5.0 3x10 B 4.0 3x10 DL 4.0 2x10 B 4.0 2x10 DL 3.5 2x10 DL 3.5 2x10 DL 2x10 DL  3.5 plates NEXT      CP              INITIALS MB 1/6 SHAUN MB 1/6 SHAUN TL/JA 1/6 CC SHAUN DH 2/6 DH 1/6 SHAUN     Assessment:     VC for proper technique. Increased reps and resistance per log pain free. Pt reports increased B quad strain that feels more as pain for her at times; education needed and pt would benefit from quad stretching B per pt tolerance.    Plans and Goals:     Continue PT per POC, progress as able.    Short Term Goals:  4 weeks  1.Report decreased B knee pain </=5/10 with moderate activities to increase tolerance for ADLs and increased functional mobility.   2. Increase B knee AROM to 0-120 in order to be able to perform ADLs without difficulty.  3. Increase strength by 1/3 MMT grade in BLE  to increase tolerance for ADL and work activities.  4. Pt to tolerate HEP to " improve ROM and independence with ADL's.     Long Term Goals: 8 weeks  1.Report decreased B knee pain </= 2/10  to increase tolerance for ADLs.  2.Patient goal: Just to get better.  3.Increase strength to >/= 4+/5 in BLE to increase tolerance for ADL and work activities.  4. Pt will report at CK level (</= 55% impaired) on KNEE FOTO to demonstrate increase in LE function with every day tasks.   5. Pt will negotiate 8 steps and ambulate community distances at >/= Mod I for increased functional mobility.  6. Increase B knee AROM to 0-130 in order to be able to perform ADLs without difficulty.    Session conducted by Ramesh Cooley under direct supervision of Fazal Paz PTA

## 2018-07-24 ENCOUNTER — CLINICAL SUPPORT (OUTPATIENT)
Dept: REHABILITATION | Facility: HOSPITAL | Age: 63
End: 2018-07-24
Payer: MEDICARE

## 2018-07-24 DIAGNOSIS — R53.1 DECREASED STRENGTH: ICD-10-CM

## 2018-07-24 DIAGNOSIS — Z74.09 DECREASED MOBILITY AND ENDURANCE: ICD-10-CM

## 2018-07-24 DIAGNOSIS — M25.669 DECREASED RANGE OF MOTION (ROM) OF KNEE: ICD-10-CM

## 2018-07-24 PROCEDURE — G8980 MOBILITY D/C STATUS: HCPCS | Mod: CL,PN

## 2018-07-24 PROCEDURE — 97110 THERAPEUTIC EXERCISES: CPT | Mod: PN

## 2018-07-24 PROCEDURE — G8979 MOBILITY GOAL STATUS: HCPCS | Mod: CK,PN

## 2018-07-24 NOTE — PROGRESS NOTES
"DAILY TREATMENT NOTE    DATE: 7/24/2018    Start Time: 11:00  Stop Time:  12:00    PROCEDURES:    TIMED  Procedure Min.       TE 55     UNTIMED  Procedure Min.   Bike 5   Supervised TE      Total Timed Minutes: 55  Total Timed Units:  4  Total Untimed Units: 0  Charges Billed/# of units: 4 TE      Progress/Current Status    Subjective:     Patient ID: Mary Choi is a 62 y.o. female.  Diagnosis:   1. Decreased range of motion (ROM) of knee     2. Decreased strength     3. Decreased mobility and endurance       Pain: Pt reported 3/10 pain in L hip and L knee  Pt reports R knee pain was an 8/10 last night though. Pt feels that she has improved overall with therapy, and she has less stiffness. Still gets episodes of increased pain that she is unsure what causes it.  .  Objective:     Pt performed bike supervised x 5 mins f/b TE 1:1 with PT x 55 mins per log below along with tests and measures for discharge today.    DATE 7/24/18 7/17/18 7/12/18 7/10/18 6/28/18 6/27/18 6/20/18 6/14/18 6/13/18 6/5/18 5/24/2018   VISIT 11/12 10/12 9/12 8/12 7/12 6/12 5/12 4/12 3/12 2/12 1/12   POC 7/24/18               G CODE 1/10 10/10 9/10 8/10 7/10 6/10 5/10  4/10 3/10 2/10 1/10   FOTO 1/10 DONE 10 9/10 8/10 7/10 DONE 5/5 next 4/5 3/5 2/5 1/5   Cap Visit  Cap Total 121.28  992.48 60.64  871.20 121.28  810.56 60.64  689.28 60.64  628.64 90.96  568.00 60.64  477.04 60.64  416.40 121.28  355.76 121.28  234.48 113.20  113.20   Bike 5' L4 5' L4 5' L3 5' L3 5' 5' 5' 5' 5' 5'     MHP          --     MT          --     Stretches:               HS stretch 3x30'' B 3x30" B 3x30'' B 3x30" B 3x30'' B 3x30'' B w/ strap 3x30'' B w/ strap 3x30'' B w/ strap 30"x3 B w/strap 30"x2 B w/strap + stairs 1x30'' R   IT band stretch                             Supine:               Quad sets D/c  D/c 10"x10 B 10x10'' B 5"x15 R  10"x10 L 5"x15 R  10"x10 L 5''x15 B towel under knee 5"x10 B 5"x10 B 5x5'' R   SLR 2x15 B 3x10 B 3x10 B w/ TA 2x10 B AROM " "full  w/TA 2x10 B  Min A for full ROM 2x10 B 2x10 B 2x10 B  Poor SLR height 4x5 R  2x10 L 2x10 B     SAQ               Knee flexion stretch               Heel slides   NT 10x10" B refused 10x B with pillowcase over foot 10x B with pillowcase over foot 10x B 1x10 R 1x10 R 5x R   SL hip abd 2x15 B 2x15 B 2x15 B    2x15 B 2x10 B 2x10 B 2x10 B 4x R   Clams               Hip add     refused w/ 1st set of bridges w/ 1st set of bridges W/ bridges w/bridges      Bridges 2x15  3x10 B 3x10 B 2x10 B 3" hold refused 5''x10 w/ ball squeeze  5x10 w/o ball 5''x10 w/ ball squeeze  5x10 w/o ball 5'' 2x10 w/ hip add 5" 2x10 w/ball 2x10 10x                   Prone:               Quad stretch               HS curls -- 3x10 1.5 # B 3x10 B  1# next                            Seated:               LAQ     NT oot  oot oot 2x10 B     Marching     NT oot  oot oot 2x10 B                      Standing:               Heel raises -- 3x10 B  Off curb 3x10 DL  Off // curb 2x15 B 2x15 DL 2x15 DL 2x15 DL 2x10 DL 2x10 DL next     Calf stretch on fitter -- 3x30" B 3x30'' B 3x30" B 3x30''  3x30" L1 3x30" DL 3x30'' DL 30"x3 DL  stairs      Mini squats -- 2x10 B 2x10 B 2x10 B 2x10 in // nt B/w mini and 9090  2x10 B UE sup in // NT // 2x5     Steamboats   NT 2x10 B 2x10 B 2 x 10 Resume next oot 2x10 B  abd/march next     TKEs               SLS               Tandem walking -- 3 laps intermittent UE sup             Cybex HS curls               Cybex LAQ               Leg Press -- 5.0 3x10 B 4.0 3x10 DL 4.0 2x10 B 4.0 2x10 DL 3.5 2x10 DL 3.5 2x10 DL 2x10 DL  3.5 plates NEXT      CP               INITIALS SHAUN MB 1/6 SHAUN MB 1/6 SHAUN TL/JA 1/6 CC SHAUN DH 2/6 DH 1/6 SHAUN       Assessment:     See discharge assessment below.    Plans and Goals:     See discharge assessment below.    REHAB SERVICES OUTPATIENT DISCHARGE SUMMARY  Physical Therapy      Name:  Maryyesy Choi  Date:  7/24/18  Date of Evaluation:  5/24/18  Physician:  Daniel Desai MD  Total # Of " Visits:  11  Diagnosis:    1. Decreased range of motion (ROM) of knee     2. Decreased strength     3. Decreased mobility and endurance         Physical/Functional Status:  At time of discharge, patient was able to perform ADLs with increased ease though still had episodes of increased B knee pain upwards to 8/10 pain. Pt does feel that B knee stiffness has improved and demo improved B knee ROM and BLE strength scores today for discharge. Pt wanted to pursue getting PT for her neck and shoulder.    EVAL:  Range of Motion/Strength:  * = knee pain with testing  Hip   Right     Left   Pain/Dysfunction with Movement     AROM PROM MMT AROM PROM MMT     Flexion WFL WFL 4-/5* 60 90 2+/5 L hip pain with L MMT/ROM   Extension WFL WFL 2+/5 WFL WFL 2+/5     Abduction WFL WFL 3+/5* WFL WFL 3+/5 B hip pain with testing   Adduction WFL WFL 4-/5* WFL WFL 4-/5* L hip pain with ROM/MMT   Internal rotation WFL WFL 4-/5* WFL WFL 2+/5 L hip pain with L testing   External rotation WFL WFL 4-/5* WFL WFL 2+/5 L hip pain with L testing      Knee   Right     Left   Pain/Dysfunction with Movement     AROM PROM MMT AROM PROM MMT     Flexion 119 124 3+/5 109 130 2+/5     Extension 0 0 3+/5 00 0 2+/5        Ankle   Right     Left   Pain/Dysfunction with Movement     AROM PROM MMT AROM PROM MMT     Plantarflexion WFL WFL 5/5 WFL WFL 5/5     Dorsiflexion WFL WFL 5/5 WFL WFL 5/5     Inversion WFL WFL NT WFL WFL NT     Eversion WFL WFL NT WFL WFL NT        Flexibility: decreased B HS length  Gait: Without AD  Analysis: decreased cordell, increased R trunk lean  Bed Mobility: Independent  Transfers: Independent  Special Tests:   Damien's Sign = positive B  Patellar Grind Test = negative B  Knee Varus Stress Test = positive on B  Knee Valgus Stress Test = positive B    D/C:  Range of Motion/Strength:  * = knee pain with testing  Hip   Right     Left   Pain/Dysfunction with Movement     AROM PROM MMT AROM PROM MMT     Flexion WFL WFL 4/5 60 90 3+/5     Extension WFL WFL 2+/5 WFL WFL 3+/5     Abduction WFL WFL 4/5 WFL WFL 3+/5    Adduction WFL WFL 4/5 WFL WFL 4-/5*    Internal rotation WFL WFL 4/5 WFL WFL 2+/5    External rotation WFL WFL 4/5 WFL WFL 2+/5       Knee   Right     Left   Pain/Dysfunction with Movement     AROM PROM MMT AROM PROM MMT     Flexion 125* 126* 4/5 125* 130* 2+/5*     Extension 0 0 4/5 0 0 2+/5*        Ankle   Right     Left   Pain/Dysfunction with Movement     AROM PROM MMT AROM PROM MMT     Plantarflexion WFL WFL 5/5 WFL WFL 5/5     Dorsiflexion WFL WFL 5/5 WFL WFL 5/5     Inversion WFL WFL NT WFL WFL NT     Eversion WFL WFL NT WFL WFL NT        Flexibility: decreased B HS length  Gait: Without AD  Analysis: decreased cordell, increased R trunk lean  Bed Mobility:Independent  Transfers: Independent  Special Tests:   Damien's Sign = negative B  Patellar Grind Test = negative B  Knee Varus Stress Test = positive L varus test, negative on R  Knee Valgus Stress Test = negative B    The patient is to be discharged from our Therapy service for the following reason(s):  Patient has reached the maximum rehab potential for the present time    Degree of Goal Achievement:  Patient has partially met goals  Short Term Goals:  4 weeks  1.Report decreased B knee pain </=5/10 with moderate activities to increase tolerance for ADLs and increased functional mobility. - partially met  2. Increase B knee AROM to 0-120 in order to be able to perform ADLs without difficulty. - Met  3. Increase strength by 1/3 MMT grade in BLE  to increase tolerance for ADL and work activities. - not met  4. Pt to tolerate HEP to improve ROM and independence with ADL's. - not met     Long Term Goals: 8 weeks  1.Report decreased B knee pain </= 2/10  to increase tolerance for ADLs. - not met  2.Patient goal: Just to get better. - met  3.Increase strength to >/= 4+/5 in BLE to increase tolerance for ADL and work activities. - not met  4. Pt will report at CK level (</= 55%  impaired) on KNEE FOTO to demonstrate increase in LE function with every day tasks.  - not met  5. Pt will negotiate 8 steps and ambulate community distances at >/= Mod I for increased functional mobility. - not met  6. Increase B knee AROM to 0-130 in order to be able to perform ADLs without difficulty. - not met    Patient Education:  Cont B knee HEP    Discharge Plan:  Home Program

## 2018-08-02 ENCOUNTER — CLINICAL SUPPORT (OUTPATIENT)
Dept: REHABILITATION | Facility: HOSPITAL | Age: 63
End: 2018-08-02
Payer: MEDICARE

## 2018-08-02 DIAGNOSIS — M62.81 DECREASED MUSCLE STRENGTH: ICD-10-CM

## 2018-08-02 DIAGNOSIS — R29.898 DECREASED ROM OF NECK: ICD-10-CM

## 2018-08-02 DIAGNOSIS — M54.2 NECK PAIN: ICD-10-CM

## 2018-08-02 PROBLEM — M25.669 DECREASED RANGE OF MOTION (ROM) OF KNEE: Status: RESOLVED | Noted: 2018-05-24 | Resolved: 2018-08-02

## 2018-08-02 PROBLEM — R53.1 DECREASED STRENGTH: Status: RESOLVED | Noted: 2018-05-24 | Resolved: 2018-08-02

## 2018-08-02 PROBLEM — Z74.09 DECREASED MOBILITY AND ENDURANCE: Status: RESOLVED | Noted: 2018-05-24 | Resolved: 2018-08-02

## 2018-08-02 PROCEDURE — 97161 PT EVAL LOW COMPLEX 20 MIN: CPT | Mod: PN

## 2018-08-02 PROCEDURE — G8984 CARRY CURRENT STATUS: HCPCS | Mod: CK,PN

## 2018-08-02 PROCEDURE — 97110 THERAPEUTIC EXERCISES: CPT | Mod: PN

## 2018-08-02 PROCEDURE — G8982 BODY POS GOAL STATUS: HCPCS | Mod: CK,PN

## 2018-08-02 NOTE — PLAN OF CARE
TIME RECORD    Date: 8/2/2018    Start Time:  2:10  Stop Time:  3:00    PROCEDURES:    TIMED  Procedure Min.   TE 10                     UNTIMED  Procedure Min.   IE 40         Total Timed Minutes:  10  Total Timed Units:  1 TE  Total Untimed Units:  1 LCE  Charges Billed/# of units:  1 TE + 1 LCE    [facility]    PHYSICAL THERAPY EVALUATION  Onset Date: 2/2017  Medical Diagnosis:   M47.812 (ICD-10-CM) - Cervical spondylosis without myelopathy   M50.30 (ICD-10-CM) - Degeneration of cervical intervertebral disc   M54.2 (ICD-10-CM) - Neck pain     Treatment Diagnosis:   1. Neck pain     2. Decreased muscle strength     3. Decreased ROM of neck       Physician: Anne Coleman PA-C  Past Medical History:   Diagnosis Date    Acid reflux     Anxiety     Arthritis     Cataract     Depression     Dry eyes     Dry mouth     Essential hypertension 5/8/2017    Rotator cuff tendinitis 4/3/2014    Thyroid disease     Ulcer      Precautions: anxiety, arthritis, depression, HTN, thyroid disease  Prior Therapy: Yes for B knees  Medications: Mary Choi has a current medication list which includes the following prescription(s): aspirin, baclofen, cholecalciferol (vitamin d3), diclofenac sodium, estrace, gabapentin, losartan, raloxifene, ropinirole, synthroid, tizanidine, and zostavax (pf), and the following Facility-Administered Medications: sodium hyaluronate (euflexxa), sodium hyaluronate (euflexxa), sodium hyaluronate (euflexxa), and sodium hyaluronate (euflexxa).  Nutrition:  Normal  History of Present Illness: See subjective below  Prior Level of Function: Independent  Social History: not working  Place of Residence (Steps/Adaptations): [facility]  Functional Deficits Leading to Referral/Nature of Injury: looking up or sideways, driving,   Patient Therapy Goals: For my neck to be more flexible and turn my head better.    Subjective     Mary Choi states there her neck pain started  bothering her about 1.5 years ago. Pt reports that she has had therapy for her R clavicle due to an overuse injury moving luggage, and that she feels popping in her R sternoclaviclar joint again just recently. Neck pain is mainly L side of neck, but also gets tingling her her B UT and neck at times. Neck pain has been getting overall worse, and will gets sharp pain at times though is always stiff. Pt has history of B shoulder rotator cuff tears a long time ago, but has mostly resolved. Pt is currently getting treated for double vision vertically and horizontally along with balance issues that has been long standing before neck pain started.    Pain:  Location: neck   Description: Aching, Tingling and Sharp  Activities Which Increase Pain: Standing and moving head, driving  Activities Which Decrease Pain: pain medication and topical muscle medication  Pain Scale: 0/10 at best 1/10 now  10/10 at worst    Objective     Posture: increased thoracic kyphosis, forward head, depressed R shoulder  Palpation: increased tissue tension at L cervical paraspinals  Sensation: light touch intact  DTRs: NT  Range of Motion/Strength:   * = neck pain with testing  AROM: CERVICAL   Flexion 58*   Extension 20* (greater than flexion)   Right side bending 27   Left side bending 16   Right rotation 32*   Left rotation 42      Shoulder  Right   Left  Pain/Dysfunction with Movement    AROM PROM MMT AROM PROM MMT    flexion WFL WFL 3+/5* 110 WFL 4+/5    extension WFL WFL 4-/5 WFL WFL 4/5    abduction 160 WFL 4-/5 125 WFL 4+/5    adduction WFL WFL 4/5 WFL WFL 4+/5    Internal rotation WFL WFL 5/5 WFL WFL 5/5    ER at 0° abd WFL WFL 5/5* WFL WFL 5/5    R handed  Flexibility: WFL  Gait: Without AD  Bed Mobility:Independent  Transfers: Independent  Special Tests:   Sharp Marcy = NT  Alar Ligament = NT  Shoulder abd test = NT  Spurling's test = negative B  Maximal Cervical Compression Test = NT  Cervical Distraction Test = negative  Deep Neck  Flexor Endurance Test (Longissimus capitis and colli strength test) = NT  Cervical Side-glides assessment = decreased L to R sideglide at C5-C8      CMS Impairment/Limitation/Restriction for FOTO Neck Survey    Therapist reviewed FOTO scores for Mary Choi on 8/2/2018.   FOTO documents entered into [employer] - see Media section.    Limitation Score: 59%  Category: Carrying    Current : CK = at least 40% but < 60% impaired, limited or restricted  Goal: CK = at least 40% but < 60% impaired, limited or restricted       TREATMENT     Time In: 2:50  Time Out: 3:00    PT Evaluation Completed? Yes  Discussed Plan of Care with patient: Yes    Mary received 10 minutes of therapeutic exercise & instruction including:  DATE 8/2/2018   VISIT 1/12   POC 10/2/18    G CODE 1/10   FOTO 1/5   Cap Visit  Cap Total 113.20  113.20   MHP    MT    Stretches:    UT stretch 20'' B  Pain, CGA required   Levator scap stretch    Supine:    Chin tuck 5x5''   Chin tuck with iso c/s extension    Isometric cervical extension Next    Scapular retractions 5x5''   DNF    Cervical rotations 5x5'' B   Dowel flexion Next    Serratus punches Next    Thoracic rotations    SL abd Next    SL flexion    SL gator        Prone:    Quadruped chin tuck    Chin tuck holds    Rows    I's/T's/Y's    6 Pack back        Standing:    Rows    Lat pull downs    Brueggers    Horizontal abd    Clocks    Wall walks        CP    INITIALS SHAUN     Written Home Exercises Provided: Yes  Mary demo fair understanding of the education provided. Patient demo fair return demo of skill of exercises.    See EMR in Patient Instructions for HEP given: Yes      Assessment       Initial Assessment (Pertinent finding, problem list and factors affecting outcome):   Pt is a 63 yo female dx with Cervical spondylosis without myelopathy, Neck pain, and Degeneration of cervical intervertebral disc presenting to PT at [facility]. Pt currently presents with neck  pain, decreased cervical ROM, decreased BUE strength, poor posture, impaired balance, and functional deficits with driving and lifting/carrying activities. Pt would benefit from skilled PT consisting of gait training, vestibular training, muscular skeletal stretching/strengthening, manual therapy, neuro muscular re-education, and modalities prn to address limitations and increase functional mobility.      History  Co-morbidities and personal factors that may impact the plan of care Co-morbidities:   angina, arthritis, back pain, cancer, depression, GI disease, headaches, HTN, osteoporosis, stroke/TIA, visual impairment      Personal Factors:   coping style     high   Examination  Body Structures and Functions, activity limitations and participation restrictions that may impact the plan of care Body Regions:   head  neck  back  upper extremities  trunk    Body Systems:    gross symmetry  ROM  strength  gross coordinated movement  balance  gait  transfers  transitions  motor control    Participation Restrictions:   none    Activity limitations:   Learning and applying knowledge  no deficits    General Tasks and Commands  no deficits    Communication  no deficits    Mobility  lifting and carrying objects  driving (bike, car, motorcycle)    Self care  no deficits    Domestic Life  doing house work (cleaning house, washing dishes, laundry)    Interactions/Relationships  no deficits    Life Areas  no deficits    Community and Social Life  no deficits         high   Clinical Presentation stable and uncomplicated low   Decision Making/ Complexity Score: low       Rehab Potiential: excellent  Spiritual/Cultural Needs: None  Barriers to Rehab: None  GOALS: Short Term Goals: 4 weeks  1. Pt will demo good deep neck flexor strength to improve cervical lordosis and stabilization.  2. Increase cervical ROM by 5-10 degrees in order to perform ADLs with decreased difficulty.  3. Pt will demo good sitting and standing posture for  improved spine health and decreased neck pain.  4. Pt to tolerate HEP to improve ROM and independence with ADL's    Long Term Goals: 8 weeks  1. Report decreased neck pain </=1/10 with no episodes of pain >5/10 to increase tolerance for ADLs.  2. Increase cervical AROM to WFL with min neck pain for increased functional mobility.  3. Increased strength in B shoulders/scapular stabilizers to >/= 4/5 MMT grade to increase tolerance for ADL and work activities.  4. Pt will report at CK level (40-60% impaired) on FOTO neck score for neck pain disability to demonstrate decrease in disability and improvement in neck pain.      Plan     Certification Period: 8/2/18 to 10/2/18  Recommended Treatment Plan: 2 times per week for 8 weeks: Cervical/Lumbar Traction, Electrical Stimulation IFC/Russian, Gait Training, Manual Therapy, Moist Heat/ Ice, Neuromuscular Re-ed, Orthotic Management and Training, Patient Education, Self Care, Therapeutic Activites and Therapeutic Exercise  Other Recommendations: modalities prn, ASTYM prn, kinesiotape prn, Functional Dry Needling prn       Sudarshan Hinton, PT  8/2/2018      I CERTIFY THE NEED FOR THESE SERVICES FURNISHED UNDER THIS PLAN OF TREATMENT AND WHILE UNDER MY CARE    Physician's comments: ________________________________________________________________________________________________________________________________________________      Physician's Name: ___________________________________

## 2018-08-02 NOTE — PROGRESS NOTES
See initial eval for neck pain in treatment section.    NIURKA Bolaños, PA-C  Neurosurgery  [facility]  [facility]    08/02/18

## 2018-08-07 ENCOUNTER — CLINICAL SUPPORT (OUTPATIENT)
Dept: REHABILITATION | Facility: HOSPITAL | Age: 63
End: 2018-08-07
Payer: MEDICARE

## 2018-08-07 DIAGNOSIS — G35 MULTIPLE SCLEROSIS: ICD-10-CM

## 2018-08-07 DIAGNOSIS — R29.898 DECREASED ROM OF NECK: ICD-10-CM

## 2018-08-07 DIAGNOSIS — M54.2 NECK PAIN: ICD-10-CM

## 2018-08-07 DIAGNOSIS — M62.81 DECREASED MUSCLE STRENGTH: ICD-10-CM

## 2018-08-07 PROCEDURE — 97140 MANUAL THERAPY 1/> REGIONS: CPT | Mod: PN

## 2018-08-07 PROCEDURE — 97110 THERAPEUTIC EXERCISES: CPT | Mod: PN

## 2018-08-07 RX ORDER — BACLOFEN 10 MG/1
TABLET ORAL
Qty: 120 TABLET | Refills: 0 | Status: SHIPPED | OUTPATIENT
Start: 2018-08-07 | End: 2018-09-14 | Stop reason: SDUPTHER

## 2018-08-07 NOTE — PROGRESS NOTES
"  Physical Therapy Daily Treatment Note     Name: Mary Choi  Clinic Number: [id number]    Therapy Diagnosis:   Encounter Diagnoses   Name Primary?    Neck pain     Decreased muscle strength     Decreased ROM of neck      Physician: Daniel Desai MD    Visit Date: 8/7/2018  Physician Orders: PT eval and treat  Medical Diagnosis:   M47.812 (ICD-10-CM) - Cervical spondylosis without myelopathy   M50.30 (ICD-10-CM) - Degeneration of cervical intervertebral disc   M54.2 (ICD-10-CM) - Neck pain   Evaluation Date: 8/2/2018  Authorization Period Expiration: 10/2/2018  Plan of Care Certification Period: 8/2/18 to 10/2/18  Visit #/Visits authorized: 2/ 12    Time In: 1555  Time Out: 1640  Total Billable Time: 45 minutes (TE-1, MT-1)    Precautions:anxiety, arthritis, depression, HTN, thyroid disease    Subjective     Pt reports: neck not really bothering her today, but when it does its on L side and feels tight  She was compliant with home exercise program.  Response to previous treatment: no adverse reaction  Functional change: none    Pain: 0/10  Location: left neck      Objective     [place] received therapeutic exercises to develop strength and ROM for 35 minutes with PTA 1:1 including:  See log  DATE 8/7/18 8/2/2018   VISIT 2/12 1/12   POC 10/2/18      G CODE 2/10 1/10   FOTO 2/5 1/5   Cap Visit  Cap Total  113.20  113.20   MHP      MT 15'     Stretches:      UT stretch 30" x 2 B 20'' B  Pain, CGA required   Levator scap stretch      Supine:      Chin tuck 10x5" 5x5''   Chin tuck with iso c/s extension      Isometric cervical extension 10x5" Next    Scapular retractions 10x5" 5x5''   DNF      Cervical rotations 5x5" B 5x5'' B   Dowel flexion 2x10 Next    Serratus punches 2x10 Next    Thoracic rotations      SL abd 2x10 Next    SL flexion      SL gator             Prone:      Quadruped chin tuck      Chin tuck holds      Rows      I's/T's/Y's      6 Pack back             Standing:      Rows      Lat pull " downs      [facility]      Horizontal abd      Clocks      Wall walks             CP      INITIALS SR 1/6 SHAUN         Luann received the following manual therapy techniques: Myofacial release and Soft tissue Mobilization were applied to the: cervical/upper trap area for 10 minutes, including:  Light manual cervical traction, STM B UT and LS      Home Exercises Provided and Patient Education Provided     Education provided:   - cont HEP as tolerates regularly    Written Home Exercises Provided: Patient instructed to cont prior HEP.  Exercises were reviewed and Luann was able to demonstrate them prior to the end of the session.  Luann demonstrated good  understanding of the education provided.     See EMR under Patient Instructions for exercises provided prior visit.    Assessment   Pt relays decrease in tightness on L UT area upon completion of MT.  Tolerates therex without difficulties or c/o increased pain  Luann is progressing well towards her goals.   Pt prognosis is Excellent.     Pt will continue to benefit from skilled outpatient physical therapy to address the deficits listed in the problem list box on initial evaluation, provide pt/family education and to maximize pt's level of independence in the home and community environment.     Pt's spiritual, cultural and educational needs considered and pt agreeable to plan of care and goals.    Anticipated barriers to physical therapy: none    Goals:   Short Term Goals: 4 weeks  1. Pt will demo good deep neck flexor strength to improve cervical lordosis and stabilization.  2. Increase cervical ROM by 5-10 degrees in order to perform ADLs with decreased difficulty.  3. Pt will demo good sitting and standing posture for improved spine health and decreased neck pain.  4. Pt to tolerate HEP to improve ROM and independence with ADL's     Long Term Goals: 8 weeks  1. Report decreased neck pain </=1/10 with no episodes of pain >5/10 to increase tolerance for ADLs.  2.  Increase cervical AROM to WFL with min neck pain for increased functional mobility.  3. Increased strength in B shoulders/scapular stabilizers to >/= 4/5 MMT grade to increase tolerance for ADL and work activities.  4. Pt will report at CK level (40-60% impaired) on FOTO neck score for neck pain disability to demonstrate decrease in disability and improvement in neck pain.    Plan     Cont POC to progress towards established goals    Adeola Pace, PTA

## 2018-08-09 ENCOUNTER — CLINICAL SUPPORT (OUTPATIENT)
Dept: REHABILITATION | Facility: HOSPITAL | Age: 63
End: 2018-08-09
Payer: MEDICARE

## 2018-08-09 DIAGNOSIS — M54.2 NECK PAIN: ICD-10-CM

## 2018-08-09 DIAGNOSIS — M62.81 DECREASED MUSCLE STRENGTH: ICD-10-CM

## 2018-08-09 DIAGNOSIS — R29.898 DECREASED ROM OF NECK: ICD-10-CM

## 2018-08-09 DIAGNOSIS — M54.16 BILATERAL LUMBAR RADICULOPATHY: ICD-10-CM

## 2018-08-09 DIAGNOSIS — G62.9 NEUROPATHY: ICD-10-CM

## 2018-08-09 PROCEDURE — 97110 THERAPEUTIC EXERCISES: CPT | Mod: PN

## 2018-08-09 PROCEDURE — 97140 MANUAL THERAPY 1/> REGIONS: CPT | Mod: PN

## 2018-08-09 NOTE — PROGRESS NOTES
"  Physical Therapy Daily Treatment Note     Name: Mary Choi  Clinic Number: [id number]    Therapy Diagnosis:   Encounter Diagnoses   Name Primary?    Neck pain     Decreased muscle strength     Decreased ROM of neck      Physician: Daniel Desai MD    Visit Date: 8/9/2018  Physician Orders: PT eval and treat  Medical Diagnosis:   M47.812 (ICD-10-CM) - Cervical spondylosis without myelopathy   M50.30 (ICD-10-CM) - Degeneration of cervical intervertebral disc   M54.2 (ICD-10-CM) - Neck pain   Evaluation Date: 8/2/2018  Authorization Period Expiration: 10/2/2018  Plan of Care Certification Period: 8/2/18 to 10/2/18  Visit #/Visits authorized: 3/12    Time In: 3:05  Time Out: 4:00  Total Billable Time: 55 3TE + 1 MT  Precautions:anxiety, arthritis, depression, HTN, thyroid disease    Subjective     Pt reports: her neck is doing fine, but has feelings of malaise and has not been performing HEP for neck or knee  She was compliant with home exercise program.  Response to previous treatment: no adverse reaction  Functional change: none    Pain: 1/10  Location: left neck      Objective     Luann received therapeutic exercises to develop strength and ROM for 40 minutes with PT 1:1 including:  See log    Charge amount for when pt came in for knee added into cap total today.  DATE 8/9/18 8/7/18 8/2/2018   VISIT 3/12 2/12 1/12   POC 10/2/18       G CODE 3/10 2/10 1/10   FOTO 3/5 2/5 1/5   Cap Visit  Cap Total 118.42  1110.90 57.78  170.98 113.20  113.20   MHP       MT 15' 15'     Stretches:       UT stretch MT 30" x 2 B 20'' B  Pain, CGA required   Levator scap stretch       Supine:       Chin tuck 3x10 5'' holds 10x5" 5x5''   Chin tuck with iso c/s extension 15x5''      Isometric cervical extension NT 10x5" Next    Scapular retractions  10x5" 5x5''   DNF 10x5'' w/ minimal chin tuck present      Cervical rotations 10x5'' B 5x5" B 5x5'' B   Dowel flexion 2x10 1# 2x10 Next    Serratus punches 2x10 1# dowel 2x10 Next "    Thoracic rotations       SL abd 2x15 B 2x10 Next    SL flexion       SL gator       SL ER 2x15 B      Prone:       Quadruped chin tuck       Chin tuck holds       Rows       I's/T's/Y's       6 Pack back               Standing:       Rows Next       Lat pull downs Next       Brueggers       Horizontal abd       Clocks       Wall slides Next               CP       INITIALS SHAUN SR 1/6 SHAUN       Luann received the following manual therapy techniques: Myofacial release and Soft tissue Mobilization were applied to the: cervical/upper trap area for 15 minutes, including:  intermittent cervical manual traction, B UT stretch, and cervical sideglides.      Home Exercises Provided and Patient Education Provided     Education provided:   - cont HEP as tolerates regularly    Written Home Exercises Provided: Patient instructed to cont prior HEP.  Exercises were reviewed and Luann was able to demonstrate them prior to the end of the session.  Luann demonstrated good  understanding of the education provided.     See EMR under Patient Instructions for exercises provided prior visit.    Assessment   Pt demo no vertebral bony restrictions in manual and full cervical AROM. Pt unable to lift head off of mat for chin tuck plus head lifts (DNF) even with pt hand assist. Pt able to lift head without chin tucks without any assistance or issue. Cont TE, and decreased MT next visit to work on cervical strengthening and stability.    Luann is progressing well towards her goals.   Pt prognosis is Excellent.     Pt will continue to benefit from skilled outpatient physical therapy to address the deficits listed in the problem list box on initial evaluation, provide pt/family education and to maximize pt's level of independence in the home and community environment.     Pt's spiritual, cultural and educational needs considered and pt agreeable to plan of care and goals.    Anticipated barriers to physical therapy: none    Goals:   Short  Term Goals: 4 weeks  1. Pt will demo good deep neck flexor strength to improve cervical lordosis and stabilization.  2. Increase cervical ROM by 5-10 degrees in order to perform ADLs with decreased difficulty.  3. Pt will demo good sitting and standing posture for improved spine health and decreased neck pain.  4. Pt to tolerate HEP to improve ROM and independence with ADL's     Long Term Goals: 8 weeks  1. Report decreased neck pain </=1/10 with no episodes of pain >5/10 to increase tolerance for ADLs.  2. Increase cervical AROM to WFL with min neck pain for increased functional mobility.  3. Increased strength in B shoulders/scapular stabilizers to >/= 4/5 MMT grade to increase tolerance for ADL and work activities.  4. Pt will report at CK level (40-60% impaired) on FOTO neck score for neck pain disability to demonstrate decrease in disability and improvement in neck pain.    Plan     Cont POC to progress towards established goals    Sudarshan Hinton, PT

## 2018-08-10 RX ORDER — GABAPENTIN 300 MG/1
CAPSULE ORAL
Qty: 270 CAPSULE | Refills: 3 | Status: SHIPPED | OUTPATIENT
Start: 2018-08-10 | End: 2019-08-09 | Stop reason: SDUPTHER

## 2018-08-15 ENCOUNTER — OFFICE VISIT (OUTPATIENT)
Dept: NEUROLOGY | Facility: CLINIC | Age: 63
End: 2018-08-15
Payer: MEDICARE

## 2018-08-15 VITALS
HEART RATE: 77 BPM | SYSTOLIC BLOOD PRESSURE: 123 MMHG | BODY MASS INDEX: 27.64 KG/M2 | DIASTOLIC BLOOD PRESSURE: 58 MMHG | HEIGHT: 61 IN | WEIGHT: 146.38 LBS

## 2018-08-15 DIAGNOSIS — G89.4 CHRONIC PAIN SYNDROME: ICD-10-CM

## 2018-08-15 DIAGNOSIS — R26.89 IMBALANCE: ICD-10-CM

## 2018-08-15 DIAGNOSIS — H81.90 VESTIBULAR DISORDER, UNSPECIFIED LATERALITY: Primary | ICD-10-CM

## 2018-08-15 DIAGNOSIS — M79.7 FIBROMYALGIA: ICD-10-CM

## 2018-08-15 DIAGNOSIS — F32.A DEPRESSION, UNSPECIFIED DEPRESSION TYPE: ICD-10-CM

## 2018-08-15 PROCEDURE — 99999 PR PBB SHADOW E&M-EST. PATIENT-LVL V: CPT | Mod: PBBFAC,,, | Performed by: PSYCHIATRY & NEUROLOGY

## 2018-08-15 PROCEDURE — 3008F BODY MASS INDEX DOCD: CPT | Mod: CPTII,S$GLB,, | Performed by: PSYCHIATRY & NEUROLOGY

## 2018-08-15 PROCEDURE — 99214 OFFICE O/P EST MOD 30 MIN: CPT | Mod: S$GLB,,, | Performed by: PSYCHIATRY & NEUROLOGY

## 2018-08-15 PROCEDURE — 3078F DIAST BP <80 MM HG: CPT | Mod: CPTII,S$GLB,, | Performed by: PSYCHIATRY & NEUROLOGY

## 2018-08-15 PROCEDURE — 3074F SYST BP LT 130 MM HG: CPT | Mod: CPTII,S$GLB,, | Performed by: PSYCHIATRY & NEUROLOGY

## 2018-08-15 NOTE — PROGRESS NOTES
Subjective:       Patient ID: Mary Choi is a 62 y.o. female.    Reason for Consult: Neurologic Problem      Interval History:  Mary Choi is here for follow up. Their condition is relatively clinically stable.  The patient has completed the MRI of the brain as well as the MRA of the brain.  We have reviewed on today's visit.  We have also discussed the EEG.  The patient notes that since last visit she had 2 more episodes of having indescribable symptoms and feeling as if she goes to another plan of existence when she has the symptoms.  She also still endorses that she has 8-10 minutes of having poor control or no control of her limbs afterwards.  She denies loss of consciousness she denies any bowel or bladder incontinence as well.  I have had a long discussion with her today about the fact that I cannot find the etiology of this to be primarily neurologic.       Objective:     Vitals:    08/15/18 1508   BP: (!) 123/58   Pulse: 77     Patient is awake alert oriented to person place and time.  Moves all 4 extremities against gravity.  Gait and station within normal limits.  Cranial nerves 2-12 without focal deficits.   Focused examination was undertaken today. Over 50% of face to face time of 25 minute visit time was in giving guidance, counseling and discussing treatment options.    Results for orders placed or performed during the hospital encounter of 06/20/18   MRA Brain without contrast    Narrative    EXAMINATION:  MRA BRAIN WITHOUT CONTRAST    CLINICAL HISTORY:  Stroke;Unspecified disorder of vestibular function, unspecified ear    TECHNIQUE:  3D Time-of-flight Images were performed over the brain.  Contrast was not administered    COMPARISON:  None    FINDINGS:  The petrous and the cavernous internal carotid arteries demonstrate no significant stenotic disease or occlusive disease or aneurysms.  The origins of the ophthalmic arteries are visualized best on the source images, without associated  aneurysms or occlusive disease.    Bilateral dominant posterior communicating arteries with suggestion of fetal origins of the posterior cerebral arteries noted bilaterally.  Severely hypoplastic or absent P1 segments of the posterior cerebral arteries.  In the anterior circulation there is no significant stenotic disease of the middle or the anterior cerebral arteries.  No anterior circulation aneurysms noted.    In the posterior circulation there is a dominant right vertebral artery.  The left vertebral artery is small in its caliber and appears to terminate in the left posterior inferior cerebellar artery.  The basilar artery is patent and small in its caliber.  The superior cerebellar arteries and the posterior cerebral arteries are unremarkable.      Impression    1. No significant occlusive disease of the intracranial vasculature.  2. Dominant posterior communicating arteries with suggestion of fetal origins of the posterior cerebral arteries bilaterally.  3. 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      Electronically signed by: Natalio Frank MD  Date:    06/20/2018  Time:    10:30   Results for orders placed or performed during the hospital encounter of 06/20/18   MRI Brain Without Contrast    Narrative    EXAMINATION:  MRI BRAIN WITHOUT CONTRAST    CLINICAL HISTORY:  Stroke; Unspecified disorder of vestibular function, unspecified ear    TECHNIQUE:  Multiplanar multisequence MR imaging of the brain was performed without contrast.    COMPARISON:  None.    FINDINGS:  Sagittal T1 weighted sequences not obtained.    There are symmetric lateral ventricles without hydrocephalus.  No abnormal extra-axial fluid collections or subdural hematomas or subarachnoid hemorrhage noted.    In the subcortical and deep periventricular white matter, mainly in the frontal lobes there are a few subcentimeter T2 hyperintensities without restricted diffusion or abnormal mass effects.  These  most likely represent changes from chronic microvascular ischemia.  However these may be related also to migraine type syndromes.  No definite signs for acute infarctions or neoplasms or midline shift or parenchymal hemorrhage or herniations is noted.  Basal ganglia are symmetric and within normal limits.    In the posterior fossa there is a normal brainstem and cerebellar hemispheres and cisternal spaces.  There is a small caliber basilar artery with findings suggestive of dominant bilateral posterior communicating arteries.  This may represent a congenital variant.  The flow void of the major vessels however is within normal limits.    There is no Chiari type malformations.  The ocular globes are symmetric and within normal limits.  Paranasal air sinuses are clear.      Impression    1. Few scattered T2 hyperintensities in the white matter of the frontal lobes most likely from chronic microvascular ischemia.  These may be related to migraine type syndromes.  Clinical correlation suggested.  2. No acute intracranial processes.      Electronically signed by: Natalio Frank MD  Date:    06/20/2018  Time:    10:12   Results for orders placed or performed during the hospital encounter of 12/06/16   MRI Brain W WO Contrast    Narrative    MRI OF THE BRAIN WITHOUT AND WITH IV CONTRAST:     Technique: Multiplanar, multisequence MR images of the brain were obtained before and after the administration of 7 cc gadolinium-based IV contrast. 3-D time-of-flight MRA images of the brain were also obtained.    Comparison: MRI brain 4/29/2014       Findings:    MRI brain:  There is no restricted diffusion to suggest acute infarction. There is redemonstration of multiple foci of T2/FLAIR signal hyperintensity within the supratentorial white matter, the overall extent and distribution of which appear stable from prior MRI examination of 4/29/2014. There is no corresponding restricted diffusion or post contrast enhancement within these  regions of flair signal hyperintensity. Findings likely represent sequela of chronic microvascular ischemic change. There is no evidence of acute intracranial hemorrhage, hydrocephalus, midline shift or mass effect. No pathologic foci of enhancement are identified on postcontrast images.    The craniocervical junction and sellar region are unremarkable. Major skull base base T2 flow-voids are present. Visualized globes and orbits as well as the paranasal sinuses are unremarkable.    MRA brain:  The distal cervical, petrous, cavernous and supraclinoid segments of the ICAs are patent bilaterally. The ACAs and MCAs are patent.    The distal right vertebral artery is dominant. The left vertebral artery is hypoplastic, essentially ending in PICA. The basilar artery is patent. 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 There is a small focal outpouching which appears to be contiguous with the left superior cerebellar artery, likely representing small infundibulum. There is slight narrowing of the pre-pontine segment of the left superior cerebellar artery.    Impression    1. No evidence of acute intracranial abnormality, specifically no evidence of acute infarct.    2. Relatively stable appearing foci of T2/FLAIR signal hyperintensity in the supratentorial white matter, which is nonspecific but likely represent sequela of chronic microvascular ischemic change.    3. Diminutive caliber of the basilar artery, likely secondary to essentially persistent bilateral fetal configuration of the PCAs. Small left superior cerebellar artery infundibulum. Nonspecific slight narrowing of the left pre-pontine segment of the left superior cerebellar artery.        Electronically signed by: BRENDAN BARRY  Date:     12/07/16  Time:    05:26        Assessment/Plan:     Problem List Items Addressed This Visit        Psychiatric     "Depression    Relevant Orders    Ambulatory Referral to Psychiatry    Ambulatory consult to Psychology       ENT    Vestibular disorder, unspecified laterality - Primary    Overview     Multiple ENT visits yield no specific treatable entity, dizziness not strictly peripheral, question supratentorial              Orthopedic    Fibromyalgia    Relevant Orders    Ambulatory Referral to Psychiatry    Ambulatory consult to Psychology       Other    Imbalance    Overview     MRI/MRA, EEG, Carotids negative during workup so far in 2016, and again in 2018  New symptoms of vague symptoms feeling out of body and having no motor control for 8-10 minutes at a time, without chronic neurologic deficits, suggests imbalance    Non-neurologic issue - Refer to Psychiatry/Psychology             62-year-old female presents for evaluation of vague episodic events that are characterized by indescribable episodes of feeling transported to another plane of existence of followed by 8-10 minutes of for limb control however no features that seem to be indicative of seizure or other paroxysmal neurologic issue.  MRI/MRA/EEG have been negative.  This is now the 2nd set of workup for her symptoms into years.  She also notes that she is having eye trouble but has admitted that she has not gone to see her ophthalmologist this year.  I have recommended that she get evaluation for some sort of mood disorder or other psychiatric issue with mental health professional.  The patient is vehemently against this treatment plan and states so.  The patient notes that she believes this to be possibly "paranormal" because she notes that no physician can figure out what is going on with her.  I have explained to the patient that at this time I can offer no further diagnostic evaluation of her.  She has to be referred to a nerve doctor" and I explained that I am a neurologist but I am happy if she has a 2nd opinion, she becomes upset.  I have placed a " "referral to Psychiatry and Psychology at [facility] but the patient complained stating that the registration and office is half a mile" the from the psychiatrist's office so she will likely not go back there.  She cites joint pain for the reason not wanting to go back to that location.      The patient verbalizes understanding and agreement with the treatment plan. I have discussed risks, benefits and alternatives to the treatment plan. Questions were sought and answered to her stated verbal satisfaction.        Dillon West MD    This note is dictated on M*Modal Fluency Direct word recognition program. There are word recognition mistakes that are occasionally missed on review.        "

## 2018-08-21 ENCOUNTER — CLINICAL SUPPORT (OUTPATIENT)
Dept: REHABILITATION | Facility: HOSPITAL | Age: 63
End: 2018-08-21
Payer: MEDICARE

## 2018-08-21 DIAGNOSIS — R29.898 DECREASED ROM OF NECK: ICD-10-CM

## 2018-08-21 DIAGNOSIS — M62.81 DECREASED MUSCLE STRENGTH: ICD-10-CM

## 2018-08-21 DIAGNOSIS — M54.2 NECK PAIN: ICD-10-CM

## 2018-08-21 PROCEDURE — 97110 THERAPEUTIC EXERCISES: CPT | Mod: PN

## 2018-08-21 PROCEDURE — 97140 MANUAL THERAPY 1/> REGIONS: CPT | Mod: PN

## 2018-08-21 NOTE — PROGRESS NOTES
"  Physical Therapy Daily Treatment Note     Name: Mary Choi  Clinic Number: [id number]    Therapy Diagnosis:   Encounter Diagnoses   Name Primary?    Neck pain     Decreased muscle strength     Decreased ROM of neck      Physician: Daniel Desai MD    Visit Date: 8/21/2018  Physician Orders: PT eval and treat  Medical Diagnosis:   M47.812 (ICD-10-CM) - Cervical spondylosis without myelopathy   M50.30 (ICD-10-CM) - Degeneration of cervical intervertebral disc   M54.2 (ICD-10-CM) - Neck pain   Evaluation Date: 8/2/2018  Authorization Period Expiration: 10/2/2018  Plan of Care Certification Period: 8/2/18 to 10/2/18  Visit #/Visits authorized: 4/ 12     Time In: 1605  Time Out: 1650  Total Billable Time: 30 minutes (1TE, 1 MT)      Precautions:anxiety, arthritis, depression, HTN, thyroid disease      Subjective     Pt reports: that she is feeling better.  Denies pain currently.  She was compliant with home exercise program.  Response to previous treatment: no adverse reaction  Functional change: none    Pain: 0/10  Location: left neck      Objective     Luann received therapeutic exercises to develop strength, endurance and ROM for 15 minutes with PTA 1:1 and 20 min with supervision including:  See log  DATE 8/20/18 8/9/18 8/7/18 8/2/2018   VISIT 4/12 3/12 2/12 1/12   POC 10/2/18          G CODE 4/10 3/10 2/10 1/10   FOTO 4/5 3/5 2/5 1/5   Cap Visit  Cap Total 57.78  1168.68 118.42  1110.90 57.78  170.98 113.20  113.20   MHP          MT 15' 15' 15'     Stretches:          UT stretch MT MT 30" x 2 B 20'' B  Pain, CGA required   Levator scap stretch          Supine:          Chin tuck 3x10 5" holds 3x10 5'' holds 10x5" 5x5''   Chin tuck with iso c/s extension 15x5" 15x5''       Isometric cervical extension 10x5" NT 10x5" Next    Scapular retractions    10x5" 5x5''   DNF  10x5'' w/ minimal chin tuck present       Cervical rotations 10x5" B 10x5'' B 5x5" B 5x5'' B   Dowel flexion 2x10 1# 2x10 1# 2x10 Next  " "  Serratus punches 2x10 1# dowel 2x10 1# dowel 2x10 Next    Thoracic rotations          SL abd 2x15 B 2x15 B 2x10 Next    SL flexion          SL gator          SL ER 2x15 B 2x15 B       Prone:          Quadruped chin tuck          Chin tuck holds          Rows          I's/T's/Y's          6 Pack back                     Standing:          Rows RTB 2x15 Next        Lat pull downs RTB 2x15 Next        Brueggers          Horizontal abd          Clocks          Wall slides 10x10" Next                   CP          INITIALS SR 1/6 SHAUN SR 1/6 SHAUN        Luann received the following manual therapy techniques: Myofacial release and Soft tissue Mobilization were applied to the: B UT  for 15 minutes, including:  intermittent cervical manual traction, B UT stretch,      Home Exercises Provided and Patient Education Provided     Education provided:   - cont HEP daily    Written Home Exercises Provided: Patient instructed to cont prior HEP.  Exercises were reviewed and Luann was able to demonstrate them prior to the end of the session.  Luann demonstrated good  understanding of the education provided.     See EMR under Patient Instructions for exercises provided prior visit.    Assessment     Pt relays some increase in pain upon completion of therex that rates at 2/10  Luann is progressing well towards her goals.   Pt prognosis is Excellent.     Pt will continue to benefit from skilled outpatient physical therapy to address the deficits listed in the problem list box on initial evaluation, provide pt/family education and to maximize pt's level of independence in the home and community environment.     Pt's spiritual, cultural and educational needs considered and pt agreeable to plan of care and goals.    Anticipated barriers to physical therapy: none    Goals:   Short Term Goals: 4 weeks  1. Pt will demo good deep neck flexor strength to improve cervical lordosis and stabilization.  2. Increase cervical ROM by 5-10 degrees " in order to perform ADLs with decreased difficulty.  3. Pt will demo good sitting and standing posture for improved spine health and decreased neck pain.  4. Pt to tolerate HEP to improve ROM and independence with ADL's     Long Term Goals: 8 weeks  1. Report decreased neck pain </=1/10 with no episodes of pain >5/10 to increase tolerance for ADLs.  2. Increase cervical AROM to WFL with min neck pain for increased functional mobility.  3. Increased strength in B shoulders/scapular stabilizers to >/= 4/5 MMT grade to increase tolerance for ADL and work activities.  4. Pt will report at CK level (40-60% impaired) on FOTO neck score for neck pain disability to demonstrate decrease in disability and improvement in neck pain.       Plan     Cont POC to progress towards established goals    Adeola Pace PTA

## 2018-08-23 ENCOUNTER — CLINICAL SUPPORT (OUTPATIENT)
Dept: REHABILITATION | Facility: HOSPITAL | Age: 63
End: 2018-08-23
Payer: MEDICARE

## 2018-08-23 DIAGNOSIS — M54.2 NECK PAIN: ICD-10-CM

## 2018-08-23 DIAGNOSIS — M62.81 DECREASED MUSCLE STRENGTH: ICD-10-CM

## 2018-08-23 DIAGNOSIS — R29.898 DECREASED ROM OF NECK: ICD-10-CM

## 2018-08-23 PROCEDURE — 97140 MANUAL THERAPY 1/> REGIONS: CPT | Mod: PN

## 2018-08-23 PROCEDURE — 97110 THERAPEUTIC EXERCISES: CPT | Mod: PN

## 2018-08-23 NOTE — PROGRESS NOTES
"  Physical Therapy Daily Treatment Note     Name: Mary Choi  Clinic Number: [id number]    Therapy Diagnosis:   Encounter Diagnoses   Name Primary?    Neck pain     Decreased muscle strength     Decreased ROM of neck      Physician: Daniel Desai MD    Visit Date: 8/23/2018  Physician Orders: PT eval and treat  Medical Diagnosis:   M47.812 (ICD-10-CM) - Cervical spondylosis without myelopathy   M50.30 (ICD-10-CM) - Degeneration of cervical intervertebral disc   M54.2 (ICD-10-CM) - Neck pain   Evaluation Date: 8/2/2018  Authorization Period Expiration: 10/2/2018  Plan of Care Certification Period: 8/2/18 to 10/2/18  Visit #/Visits authorized: 5/ 12     Time In: 1400  Time Out: 1435  Total Billable Time: 35 minutes (1TE, 1 MT)      Precautions:anxiety, arthritis, depression, HTN, thyroi      Subjective     Pt reports: she is feeling better when she leaves therapy.  Relays that she has time constraints and needs to leave appt early  She was compliant with home exercise program.  Response to previous treatment: no adverse reaction   Functional change: none    Pain: 3/10  Location: left neck      Objective     Luann received therapeutic exercises to develop strength, endurance and ROM for 20 minutes with PTA 1:1 including  See log  DATE 8/23/18 8/20/18 8/9/18 8/7/18 8/2/2018   VISIT 5/12 4/12 3/12 2/12 1/12   POC 10/2/18            G CODE 5/10 4/10 3/10 2/10 1/10   FOTO 5/5 Next 4/5 3/5 2/5 1/5   Cap Visit  Cap Total 57.78  1226.456   118.42  1110.90 57.78  170.98 113.20  113.20   MHP            MT 15' 15' 15' 15'     Stretches:            UT stretch MT MT MT 30" x 2 B 20'' B  Pain, CGA required   Levator scap stretch            Supine:            Chin tuck OOT 3x10 5" holds 3x10 5'' holds 10x5" 5x5''   Chin tuck with iso c/s extension OOT 15x5" 15x5''       Isometric cervical extension OOT 10x5" NT 10x5" Next    Scapular retractions      10x5" 5x5''   DNF OOT   10x5'' w/ minimal chin tuck present     " "  Cervical rotations OOT 10x5" B 10x5'' B 5x5" B 5x5'' B   Dowel flexion 2x12 1# 2x10 1# 2x10 1# 2x10 Next    Serratus punches 2x12 1# 2x10 1# dowel 2x10 1# dowel 2x10 Next    Thoracic rotations            SL abd OOT 2x15 B 2x15 B 2x10 Next    SL flexion            SL gator            SL ER OOT 2x15 B 2x15 B       Prone:            Quadruped chin tuck            Chin tuck holds            Rows            I's/T's/Y's            6 Pack back                         Standing:            Rows RTB 2x15 RTB 2x15 Next        Lat pull downs RTB 2x15 RTB 2x15 Next        Brueggers            Horizontal abd            Clocks            Wall slides 10x10" 10x10" Next                     CP            INITIALS SR 2/6 SR 1/6 SHAUN SR 1/6 SHAUN          Luann received the following manual therapy techniques: Myofacial release and Soft tissue Mobilization were applied to the: B UT/cervial paraspinals  for 15 minutes, including:  intermittent cervical manual traction, B UT stretch    Home Exercises Provided and Patient Education Provided     Education provided  - cont HEP daily    Written Home Exercises Provided: Patient instructed to cont prior HEP.  Exercises were reviewed and Luann was able to demonstrate them prior to the end of the session.  Luann demonstrated good  understanding of the education provided.     See EMR under Patient Instructions for exercises provided prior visit.    Assessment     Pt tolerates modified treatment today 2* personal time constraints without difficulites.  Relays decreased pain upon completion  Luann is progressing well towards her goals.   Pt prognosis is Good.     Pt will continue to benefit from skilled outpatient physical therapy to address the deficits listed in the problem list box on initial evaluation, provide pt/family education and to maximize pt's level of independence in the home and community environment.     Pt's spiritual, cultural and educational needs considered and pt agreeable to " plan of care and goals.    Anticipated barriers to physical therapy: none    Goals:   Short Term Goals: 4 weeks  1. Pt will demo good deep neck flexor strength to improve cervical lordosis and stabilization.  2. Increase cervical ROM by 5-10 degrees in order to perform ADLs with decreased difficulty.  3. Pt will demo good sitting and standing posture for improved spine health and decreased neck pain.  4. Pt to tolerate HEP to improve ROM and independence with ADL's     Long Term Goals: 8 weeks  1. Report decreased neck pain </=1/10 with no episodes of pain >5/10 to increase tolerance for ADLs.  2. Increase cervical AROM to WFL with min neck pain for increased functional mobility.  3. Increased strength in B shoulders/scapular stabilizers to >/= 4/5 MMT grade to increase tolerance for ADL and work activities.  4. Pt will report at CK level (40-60% impaired) on FOTO neck score for neck pain disability to demonstrate decrease in disability and improvement in neck pain.    Plan     Cont POC to progress towards established goals    Adeola Pace PTA

## 2018-08-28 ENCOUNTER — CLINICAL SUPPORT (OUTPATIENT)
Dept: REHABILITATION | Facility: HOSPITAL | Age: 63
End: 2018-08-28
Payer: MEDICARE

## 2018-08-28 DIAGNOSIS — M54.2 NECK PAIN: ICD-10-CM

## 2018-08-28 DIAGNOSIS — M62.81 DECREASED MUSCLE STRENGTH: ICD-10-CM

## 2018-08-28 DIAGNOSIS — R29.898 DECREASED ROM OF NECK: ICD-10-CM

## 2018-08-28 PROCEDURE — 97140 MANUAL THERAPY 1/> REGIONS: CPT | Mod: PN

## 2018-08-28 PROCEDURE — 97110 THERAPEUTIC EXERCISES: CPT | Mod: PN

## 2018-08-28 NOTE — PROGRESS NOTES
"  Physical Therapy Daily Treatment Note     Name: Mary Choi  Clinic Number: [id number]    Therapy Diagnosis:   Encounter Diagnoses   Name Primary?    Neck pain     Decreased muscle strength     Decreased ROM of neck      Physician: Daniel Desai MD    Visit Date: 8/28/2018  Physician Orders: PT eval and treat  Medical Diagnosis:   M47.812 (ICD-10-CM) - Cervical spondylosis without myelopathy   M50.30 (ICD-10-CM) - Degeneration of cervical intervertebral disc   M54.2 (ICD-10-CM) - Neck pain   Evaluation Date: 8/2/2018  Authorization Period Expiration: 10/2/2018  Plan of Care Certification Period: 8/2/18 to 10/2/18  Visit #/Visits authorized: 6/ 12     Time In: 1510  Time Out: 1610  Total Billable Time: 35 minutes (1TE, 1 MT)      Precautions:anxiety, arthritis, depression, HTN         Subjective     Pt reports: that she feels she is improving a little bit.  She was not compliant with home exercise program.  Relays that she doesn't always do HEP  Response to previous treatment: no adverse reaction  Functional change: none    Pain: 1/10  Location: left neck      Objective     Luann received therapeutic exercises to develop strength and ROM for 45 minutes with PTA 1:1 including:  See log  DATE 8/28/18 8/23/18 8/20/18   VISIT 6/12 5/12 4/12   POC 10/2/18        G CODE 6/10 5/10 4/10   FOTO done 5/5 Next 4/5   Cap Visit  Cap Total 57.78  1284.23 57.78  1226.456     MHP        MT 15' 15' 15'   Stretches:        UT stretch MT MT MT   Levator scap stretch        Supine:        Chin tuck 3x10 5" hold OOT 3x10 5" holds   Chin tuck with iso c/s extension 15x5" OOT 15x5"   Isometric cervical extension 10x5" OOT 10x5"   Scapular retractions        DNF  OOT     Cervical rotations 10x5" B OOT 10x5" B   Dowel flexion 2x15 1# 2x12 1# 2x10 1#   Serratus punches 2x15 1# 2x12 1# 2x10 1# dowel   Thoracic rotations        SL abd 2x15 B OOT 2x15 B   SL flexion        SL gator        SL ER 2x15 B OOT 2x15 B   Prone:      " "  Quadruped chin tuck        Chin tuck holds        Rows        I's/T's/Y's        6 Pack back                 Standing:        Rows RTB 2x20 RTB 2x15 RTB 2x15   Lat pull downs RTB 2x20 RTB 2x15 RTB 2x15   Brueggers        Horizontal abd        Clocks        Wall slides 10x10" 10x10" 10x10"            CP        INITIALS SR 3/6 SR 2/6 SR 1/6             Luann received the following manual therapy techniques: Myofacial release and Soft tissue Mobilization were applied to the: neck/B UT for 15 minutes, including:  intermittent cervical manual traction, B UT stretch    Home Exercises Provided and Patient Education Provided     Education provided:   - cont HEP daily    Written Home Exercises Provided: Patient instructed to cont prior HEP.  Exercises were reviewed and Luann was able to demonstrate them prior to the end of the session.  Luann demonstrated good  understanding of the education provided.     See EMR under Patient Instructions for exercises provided prior visit.    Assessment     Pt relays decrease in stiffness and pain upon completion of therapy session.    Luann is progressing well towards her goals.   Pt prognosis is Excellent.     Pt will continue to benefit from skilled outpatient physical therapy to address the deficits listed in the problem list box on initial evaluation, provide pt/family education and to maximize pt's level of independence in the home and community environment.     Pt's spiritual, cultural and educational needs considered and pt agreeable to plan of care and goals.    Anticipated barriers to physical therapy: none    Goals:   Short Term Goals: 4 weeks  1. Pt will demo good deep neck flexor strength to improve cervical lordosis and stabilization.  2. Increase cervical ROM by 5-10 degrees in order to perform ADLs with decreased difficulty.  3. Pt will demo good sitting and standing posture for improved spine health and decreased neck pain.  4. Pt to tolerate HEP to improve ROM and " independence with ADL's     Long Term Goals: 8 weeks  1. Report decreased neck pain </=1/10 with no episodes of pain >5/10 to increase tolerance for ADLs.  2. Increase cervical AROM to WFL with min neck pain for increased functional mobility.  3. Increased strength in B shoulders/scapular stabilizers to >/= 4/5 MMT grade to increase tolerance for ADL and work activities.  4. Pt will report at CK level (40-60% impaired) on FOTO neck score for neck pain disability to demonstrate decrease in disability and improvement in neck pain.       Plan     Cont POC to progress towards established goals    Adeola Pace, PTA

## 2018-09-04 ENCOUNTER — CLINICAL SUPPORT (OUTPATIENT)
Dept: REHABILITATION | Facility: HOSPITAL | Age: 63
End: 2018-09-04
Payer: MEDICARE

## 2018-09-04 DIAGNOSIS — M62.81 DECREASED MUSCLE STRENGTH: ICD-10-CM

## 2018-09-04 DIAGNOSIS — R29.898 DECREASED ROM OF NECK: ICD-10-CM

## 2018-09-04 DIAGNOSIS — M54.2 NECK PAIN: ICD-10-CM

## 2018-09-04 PROCEDURE — 97110 THERAPEUTIC EXERCISES: CPT | Mod: PN

## 2018-09-04 PROCEDURE — 97140 MANUAL THERAPY 1/> REGIONS: CPT | Mod: PN

## 2018-09-04 NOTE — PROGRESS NOTES
"  Physical Therapy Daily Treatment Note     Name: Mary Choi  Clinic Number: [id number]    Therapy Diagnosis:   Encounter Diagnoses   Name Primary?    Neck pain     Decreased muscle strength     Decreased ROM of neck      Physician: Daniel Desai MD    Visit Date: 9/4/2018  Physician Orders: PT eval and treat  Medical Diagnosis:   M47.812 (ICD-10-CM) - Cervical spondylosis without myelopathy   M50.30 (ICD-10-CM) - Degeneration of cervical intervertebral disc   M54.2 (ICD-10-CM) - Neck pain   Evaluation Date: 8/2/2018  Authorization Period Expiration: 10/2/2018  Plan of Care Certification Period: 8/2/18 to 10/2/18  Visit #/Visits authorized: 6/ 12     Time In: 1400  Time Out: 1500  Total Billable Time: 60 minutes (3TE, 1 MT)      Precautions:anxiety, arthritis, depression, HTN         Subjective     Pt reports: that she feels she is improving a little bit.  She was not compliant with home exercise program.  Relays that she doesn't always do HEP  Response to previous treatment: no adverse reaction  Functional change: none    Pain: 1/10  Location: left neck. Stiff not pain     Objective     Luann received therapeutic exercises to develop strength and ROM for 40 minutes with PTA 1:1 including:  See log  DATE 9/4/18 8/28/18 8/23/18 8/20/18   VISIT 7/12 6/12 5/12 4/12   POC 10/2/18         G CODE 7/10 6/10 5/10 4/10   FOTO 71/0 done 5/5 Next 4/5   Cap Visit  Cap Total  57.78  1284.23 57.78  1226.456     MHP         MT 20' 15' 15' 15'   Stretches:         UT stretch MT MT MT MT   Levator scap stretch MT        Supine:         Chin tuck 3x10 5" hold 3x10 5" hold OOT 3x10 5" holds   Chin tuck with iso c/s extension 15x5" 15x5" OOT 15x5"   Isometric cervical extension 10x5" 10x5" OOT 10x5"   Scapular retractions         DNF   OOT     Cervical rotations 10x5" 10x5" B OOT 10x5" B   Dowel flexion 2x15 2# 2x15 1# 2x12 1# 2x10 1#   Serratus punches 2x15 2# 2x15 1# 2x12 1# 2x10 1# dowel   Thoracic rotations         SL " "abd 2x15 1# B 2x15 B OOT 2x15 B   SL flexion         SL gator 2x15 1# B        SL ER 2x15 1# B 2x15 B OOT 2x15 B   Prone:         Quadruped chin tuck         Chin tuck holds         Rows         I's/T's/Y's         6 Pack back                   Standing:         Rows RTB 2x20 RTB 2x20 RTB 2x15 RTB 2x15   Lat pull downs RTB 2x10 RTB 2x20 RTB 2x15 RTB 2x15   Brueggers         Horizontal abd         Clocks         Wall slides 10x10" 10x10" 10x10" 10x10"             CP         INITIALS MB 4/6 SR 3/6 SR 2/6 SR 1/6             Luann received the following manual therapy techniques: Myofacial release and Soft tissue Mobilization were applied to the: neck/B UT for 20 minutes, including:  intermittent cervical manual traction, B UT stretch    Home Exercises Provided and Patient Education Provided     Education provided:   - cont HEP daily    Written Home Exercises Provided: Patient instructed to cont prior HEP.  Exercises were reviewed and Luann was able to demonstrate them prior to the end of the session.  Luann demonstrated good  understanding of the education provided.     See EMR under Patient Instructions for exercises provided prior visit.    Assessment     Tolerated well. Subjective progress in terms of less stiffness.    Luann is progressing well towards her goals.   Pt prognosis is Excellent.     Pt will continue to benefit from skilled outpatient physical therapy to address the deficits listed in the problem list box on initial evaluation, provide pt/family education and to maximize pt's level of independence in the home and community environment.     Pt's spiritual, cultural and educational needs considered and pt agreeable to plan of care and goals.    Anticipated barriers to physical therapy: none    Goals:   Short Term Goals: 4 weeks  1. Pt will demo good deep neck flexor strength to improve cervical lordosis and stabilization.  2. Increase cervical ROM by 5-10 degrees in order to perform ADLs with " decreased difficulty.  3. Pt will demo good sitting and standing posture for improved spine health and decreased neck pain.  4. Pt to tolerate HEP to improve ROM and independence with ADL's     Long Term Goals: 8 weeks  1. Report decreased neck pain </=1/10 with no episodes of pain >5/10 to increase tolerance for ADLs.  2. Increase cervical AROM to WFL with min neck pain for increased functional mobility.  3. Increased strength in B shoulders/scapular stabilizers to >/= 4/5 MMT grade to increase tolerance for ADL and work activities.  4. Pt will report at CK level (40-60% impaired) on FOTO neck score for neck pain disability to demonstrate decrease in disability and improvement in neck pain.       Plan     Cont POC to progress towards established goals    Fazal Paz PTA

## 2018-09-11 ENCOUNTER — CLINICAL SUPPORT (OUTPATIENT)
Dept: REHABILITATION | Facility: HOSPITAL | Age: 63
End: 2018-09-11
Payer: MEDICARE

## 2018-09-11 DIAGNOSIS — M54.2 NECK PAIN: ICD-10-CM

## 2018-09-11 DIAGNOSIS — M62.81 DECREASED MUSCLE STRENGTH: ICD-10-CM

## 2018-09-11 DIAGNOSIS — R29.898 DECREASED ROM OF NECK: ICD-10-CM

## 2018-09-11 PROCEDURE — 97140 MANUAL THERAPY 1/> REGIONS: CPT | Mod: PN

## 2018-09-11 PROCEDURE — 97110 THERAPEUTIC EXERCISES: CPT | Mod: PN

## 2018-09-11 NOTE — PROGRESS NOTES
"  Physical Therapy Daily Treatment Note     Name: Mary Choi  Clinic Number: [id number]    Therapy Diagnosis:   Encounter Diagnoses   Name Primary?    Neck pain     Decreased muscle strength     Decreased ROM of neck      Physician: Daniel Desai MD    Visit Date: 9/11/2018    Physician Orders: PT eval and treat  Medical Diagnosis:   M47.812 (ICD-10-CM) - Cervical spondylosis without myelopathy   M50.30 (ICD-10-CM) - Degeneration of cervical intervertebral disc   M54.2 (ICD-10-CM) - Neck pain   Evaluation Date: 8/2/2018  Authorization Period Expiration: 10/2/2018  Plan of Care Certification Period: 8/2/18 to 10/2/18  Visit #/Visits authorized: 8/ 12     Time In: 1500  Time Out: 1600  Total Billable Time: 60 minutes (3TE, 1 MT)      Precautions:anxiety, arthritis, depression, HTN      Subjective     Pt reports: she feels a little better.  Able to perform some activities with less pain.  Does relay that she is having increased pain today  She was compliant with home exercise program.  Response to previous treatment: no adverse reaction  Functional change: none    Pain: 2/10  Location: left UT/neck      Objective     [place] received therapeutic exercises to develop strength and flexibility for 45 minutes with PTA 1:1 including:  See log    DATE 9/11/18 9/4/18 8/28/18 8/23/18   VISIT 8/12 7/12 6/12 5/12   POC 10/2/18          G CODE 8/10 7/10 6/10 5/10   FOTO 8/10 71/0 done 5/5 Next   Cap Visit  Cap Total 118.42  1521.07  118.42  1402.65 57.78  1284.23 57.78  1226.456   MHP          MT 15' 20' 15' 15'   Stretches:          UT stretch MT MT MT MT   Levator scap stretch  MT       Supine:          Chin tuck 3x15 5" hold 3x10 5" hold 3x10 5" hold OOT   Chin tuck with iso c/s extension 15x5" 15x5" 15x5" OOT   Isometric cervical extension  10x5" 10x5" OOT   Scapular retractions          DNF      OOT   Cervical rotations 15x5" 10x5" 10x5" B OOT   Dowel flexion 2x15 2# 2x15 2# 2x15 1# 2x12 1#   Serratus punches " "2x15 2# 2x15 2# 2x15 1# 2x12 1#   Thoracic rotations          SL abd 1# B 2x15 2x15 1# B 2x15 B OOT   SL flexion          SL gator 1# B 2x15 2x15 1# B       SL ER 1# B 2x15 2x15 1# B 2x15 B OOT   Prone:          Quadruped chin tuck          Chin tuck holds          Rows          I's/T's/Y's          6 Pack back                     Standing:          Rows GTB 2x15 RTB 2x20 RTB 2x20 RTB 2x15   Lat pull downs GTB 2x15 RTB 2x10 RTB 2x20 RTB 2x15   Brueggers          Horizontal abd          Clocks          Wall slides 10x10" 10x10" 10x10" 10x10"              CP          INITIALS SR 5/6 MB 4/6 SR 3/6 SR 2/6             Luann received the following manual therapy techniques: Myofacial release and Soft tissue Mobilization were applied to the: B UT for 15 minutes, including:  intermittent cervical manual traction, B UT stretch and STM/MFR to B UT/cervical paraspinals      Home Exercises Provided and Patient Education Provided     Education provided:   - cont HEP regularly    Written Home Exercises Provided: Patient instructed to cont prior HEP.  Exercises were reviewed and Luann was able to demonstrate them prior to the end of the session.  Luann demonstrated good  understanding of the education provided.     See EMR under Patient Instructions for exercises provided prior visit.    Assessment     Pt c/o some increased pain today upon completion of therapy activities.  Relays she will ice at home  Luann is progressing well towards her goals.   Pt prognosis is Excellent.     Pt will continue to benefit from skilled outpatient physical therapy to address the deficits listed in the problem list box on initial evaluation, provide pt/family education and to maximize pt's level of independence in the home and community environment.     Pt's spiritual, cultural and educational needs considered and pt agreeable to plan of care and goals.    Anticipated barriers to physical therapy: none    Goals:   Short Term Goals: 4 weeks  1. " Pt will demo good deep neck flexor strength to improve cervical lordosis and stabilization.  2. Increase cervical ROM by 5-10 degrees in order to perform ADLs with decreased difficulty.  3. Pt will demo good sitting and standing posture for improved spine health and decreased neck pain.  4. Pt to tolerate HEP to improve ROM and independence with ADL's     Long Term Goals: 8 weeks  1. Report decreased neck pain </=1/10 with no episodes of pain >5/10 to increase tolerance for ADLs.  2. Increase cervical AROM to WFL with min neck pain for increased functional mobility.  3. Increased strength in B shoulders/scapular stabilizers to >/= 4/5 MMT grade to increase tolerance for ADL and work activities.  4. Pt will report at CK level (40-60% impaired) on FOTO neck score for neck pain disability to demonstrate decrease in disability and improvement in neck pain.       Plan     Cont POC to progress towards established goals    Adeola Pace, PTA

## 2018-09-12 ENCOUNTER — TELEPHONE (OUTPATIENT)
Dept: SPINE | Facility: CLINIC | Age: 63
End: 2018-09-12

## 2018-09-13 ENCOUNTER — OFFICE VISIT (OUTPATIENT)
Dept: SPINE | Facility: CLINIC | Age: 63
End: 2018-09-13
Payer: MEDICARE

## 2018-09-13 VITALS
HEART RATE: 79 BPM | BODY MASS INDEX: 24.92 KG/M2 | SYSTOLIC BLOOD PRESSURE: 153 MMHG | WEIGHT: 146 LBS | HEIGHT: 64 IN | DIASTOLIC BLOOD PRESSURE: 70 MMHG

## 2018-09-13 DIAGNOSIS — R20.0 BILATERAL NUMBNESS AND TINGLING OF ARMS AND LEGS: ICD-10-CM

## 2018-09-13 DIAGNOSIS — R20.2 BILATERAL NUMBNESS AND TINGLING OF ARMS AND LEGS: ICD-10-CM

## 2018-09-13 DIAGNOSIS — M50.30 DDD (DEGENERATIVE DISC DISEASE), CERVICAL: ICD-10-CM

## 2018-09-13 DIAGNOSIS — M54.2 NECK PAIN: ICD-10-CM

## 2018-09-13 DIAGNOSIS — R26.89 BALANCE DISORDER: ICD-10-CM

## 2018-09-13 DIAGNOSIS — H53.2 DOUBLE VISION: ICD-10-CM

## 2018-09-13 DIAGNOSIS — M47.812 CERVICAL SPONDYLOSIS WITHOUT MYELOPATHY: Primary | ICD-10-CM

## 2018-09-13 PROCEDURE — 99999 PR PBB SHADOW E&M-EST. PATIENT-LVL V: CPT | Mod: PBBFAC,,, | Performed by: PHYSICIAN ASSISTANT

## 2018-09-13 PROCEDURE — 99214 OFFICE O/P EST MOD 30 MIN: CPT | Mod: S$PBB,,, | Performed by: PHYSICIAN ASSISTANT

## 2018-09-13 PROCEDURE — 3008F BODY MASS INDEX DOCD: CPT | Mod: CPTII,,, | Performed by: PHYSICIAN ASSISTANT

## 2018-09-13 PROCEDURE — 3078F DIAST BP <80 MM HG: CPT | Mod: CPTII,,, | Performed by: PHYSICIAN ASSISTANT

## 2018-09-13 PROCEDURE — 3077F SYST BP >= 140 MM HG: CPT | Mod: CPTII,,, | Performed by: PHYSICIAN ASSISTANT

## 2018-09-13 PROCEDURE — 99215 OFFICE O/P EST HI 40 MIN: CPT | Mod: PBBFAC | Performed by: PHYSICIAN ASSISTANT

## 2018-09-13 NOTE — PROGRESS NOTES
"Subjective:     Patient ID:  Mary Choi is a 62 y.o. female.      Chief Complaint: Neck pain and bilateral arm pain    HPI    Mary Choi is a 62 y.o. female who presents for follow up.  Patient states the PT hasn't really helped and now she is having new right sided neck pain.  Some pain into the bilateral arms to the elbows that comes and goes.    She is is describing multiple other complaints like dizziness, balance disorder, bilateral arm and leg paresthesias, double vision.  It seems she has seen multiple neurologists in the past.    Patient denies any recent accidents or trauma, no saddle anesthesias, and no bowel or bladder incontinence.    Review of Systems:  Constitution: Negative for chills, fever, night sweats and weight loss.   Musculoskeletal: Negative for falls.   Gastrointestinal: Negative for bowel incontinence, nausea and vomiting.   Genitourinary: Negative for bladder incontinence.   Neurological: Positive for disturbances in coordination and loss of balance.      Objective:      Vitals:    09/13/18 1134   BP: (!) 153/70   Pulse: 79   Weight: 66.2 kg (146 lb)   Height: 5' 4" (1.626 m)   PainSc:   4   PainLoc: Neck         Physical Exam:    General:  Mary Choi is well-developed, well-nourished, appears stated age, in no acute distress, alert and oriented to person, place, and time.    Patient sits comfortably in the exam room and answers questions appropriately. Grossly patient is able to move bilateral upper extremities without difficulty.     Xray/MRI Interpretation:      Cervical spine ap/lateral/flexion/extension xrays were personally reviewed today.  No fractures.  No movement on flexion and extension.  Mild DDD at C5-6.     Cervical spine MRI was personally reviewed today.  Mild DDD at C5-6 with BBDD and slight indentation of the spinal cord anteriorly.      Assessment:          1. Cervical spondylosis without myelopathy    2. DDD (degenerative disc disease), cervical    3. Neck " pain    4. Double vision    5. Balance disorder    6. Bilateral numbness and tingling of arms and legs            Plan:          Orders Placed This Encounter    Ambulatory referral to Neurology       C5-6 DDD/BBDD     -Recommended C7-T1 IL ELFEGO but she would like to hold off for now  -She would like to fu in the MS clinic for further workup.  We will send them a message.    Follow-Up:  Follow-up if symptoms worsen or fail to improve. If there are any questions prior to this, the patient was instructed to contact the office.       NIURKA Bolaños, PA-C  Neurosurgery  [facility]  [facility]

## 2018-09-14 DIAGNOSIS — G35 MULTIPLE SCLEROSIS: ICD-10-CM

## 2018-09-14 RX ORDER — BACLOFEN 10 MG/1
TABLET ORAL
Qty: 120 TABLET | Refills: 0 | Status: SHIPPED | OUTPATIENT
Start: 2018-09-14

## 2018-09-18 ENCOUNTER — TELEPHONE (OUTPATIENT)
Dept: NEUROLOGY | Facility: CLINIC | Age: 63
End: 2018-09-18

## 2018-09-18 NOTE — TELEPHONE ENCOUNTER
----- Message from Ellie Muller sent at 9/18/2018 10:47 AM CDT -----  Patient needs to be scheduled in the MS clinic for a workup.  Seeing a PA would be fine.  Please schedule     Thank you

## 2018-09-18 NOTE — TELEPHONE ENCOUNTER
Returned call, pt does not have dx of MS. Offered to schedule with general neurology, or recommended pt call Dr. West. Pt verbalized understanding, and declined appointment.

## 2018-09-19 ENCOUNTER — TELEPHONE (OUTPATIENT)
Dept: NEUROLOGY | Facility: CLINIC | Age: 63
End: 2018-09-19

## 2018-09-19 NOTE — TELEPHONE ENCOUNTER
----- Message from Tonie Villatoro sent at 9/19/2018 10:39 AM CDT -----            Name of Who is Calling: ELZA BACON [[id number]]      What is the request in detail: Pt is returning a call from Arianna.      Can the clinic reply by [facility]: no      What Number [facility] Back [facility]: [phone]

## 2018-09-19 NOTE — TELEPHONE ENCOUNTER
Spoke with patient and explained to her that dr. gonzalez wants her to see psychiatry and psychology once before coming back to see him, patient said she understands

## 2018-09-20 ENCOUNTER — OFFICE VISIT (OUTPATIENT)
Dept: PSYCHIATRY | Facility: CLINIC | Age: 63
End: 2018-09-20
Payer: COMMERCIAL

## 2018-09-20 ENCOUNTER — TELEPHONE (OUTPATIENT)
Dept: REHABILITATION | Facility: HOSPITAL | Age: 63
End: 2018-09-20

## 2018-09-20 DIAGNOSIS — F32.A ANXIETY AND DEPRESSION: Primary | ICD-10-CM

## 2018-09-20 DIAGNOSIS — F41.9 ANXIETY AND DEPRESSION: Primary | ICD-10-CM

## 2018-09-20 PROCEDURE — 90791 PSYCH DIAGNOSTIC EVALUATION: CPT | Mod: S$GLB,,, | Performed by: SOCIAL WORKER
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
## 2018-09-25 ENCOUNTER — CLINICAL SUPPORT (OUTPATIENT)
Dept: REHABILITATION | Facility: HOSPITAL | Age: 63
End: 2018-09-25
Payer: MEDICARE

## 2018-09-25 DIAGNOSIS — R29.898 DECREASED ROM OF NECK: ICD-10-CM

## 2018-09-25 DIAGNOSIS — M54.2 NECK PAIN: ICD-10-CM

## 2018-09-25 DIAGNOSIS — M62.81 DECREASED MUSCLE STRENGTH: ICD-10-CM

## 2018-09-25 PROCEDURE — 97110 THERAPEUTIC EXERCISES: CPT | Mod: PN

## 2018-09-25 NOTE — PROGRESS NOTES
"  Physical Therapy Daily Treatment Note     Name: Mary Choi  Clinic Number: [id number]    Therapy Diagnosis:   Encounter Diagnoses   Name Primary?    Neck pain     Decreased muscle strength     Decreased ROM of neck      Physician: Daniel Desai MD    Visit Date: 9/25/2018    Physician Orders: PT eval and treat  Medical Diagnosis:   M47.812 (ICD-10-CM) - Cervical spondylosis without myelopathy   M50.30 (ICD-10-CM) - Degeneration of cervical intervertebral disc   M54.2 (ICD-10-CM) - Neck pain   Evaluation Date: 8/2/2018  Authorization Period Expiration: 10/2/2018  Plan of Care Certification Period: 8/2/18 to 10/2/18  Visit #/Visits authorized: 9/ 12     Time In: 1400  Time Out: 1500  Total Billable Time: 45 minutes (3TE)      Precautions:anxiety, arthritis, depression, HTN      Subjective     Pt reports: she got the flu last week and had to cancel. Physically overall she is systematically better. She feels stronger with the exercises. Strength is better than when she first started. No change in the tightness in her neck since she started therapy. She does have some neurological issues that are going on and is under the care of the neurologyl. The last tests she had done was the MRI and was told nothing was found.    She was compliant with home exercise program.  Response to previous treatment: "I felt okay"  Functional change: none    Pain: 8/10   Location: left>right UT/neck      Objective     Luann received therapeutic exercises to develop strength and flexibility x  60 including:      DATE 9/25/18 9/11/18 9/4/18 8/28/18 8/23/18   VISIT 9/12 8/12 7/12 6/12 5/12   POC 10/2/18           G CODE 9/10 8/10 7/10 6/10 5/10   FOTO 9/10 8/10 71/0 done 5/5 Next   Cap Visit  Cap Total 90  1611 118.42  1521.07  118.42  1402.65 57.78  1284.23 57.78  1226.456   MHP           MT  15' 20' 15' 15'   Stretches:           UT stretch 20'' x 5 B MT MT MT MT   Supine:           Chin tuck 3x15 5'' hold 3x15 5" hold 3x10 5" " "hold 3x10 5" hold OOT   Chin tuck with iso c/s extension 15''x5 15x5" 15x5" 15x5" OOT   Isometric cervical extension --  10x5" 10x5" OOT   DNF 1" hold      OOT   Cervical rotations 15x5'' 15x5" 10x5" 10x5" B OOT   Dowel flexion 2x15 2# 2x15 2# 2x15 2# 2x15 1# 2x12 1#   Serratus punches 2x15 2# 2x15 2# 2x15 2# 2x15 1# 2x12 1#   SL abd 1# 2x15 B 1# B 2x15 2x15 1# B 2x15 B OOT   SL gator  1# B 2x15 2x15 1# B       SL ER 1# B 2x15 1# B 2x15 2x15 1# B 2x15 B OOT   Standing:           Rows -- GTB 2x15 RTB 2x20 RTB 2x20 RTB 2x15   Lat pull downs -- GTB 2x15 RTB 2x10 RTB 2x20 RTB 2x15   Wall slides -- 10x10" 10x10" 10x10" 10x10"   INITIALS RS SR 5/6 MB 4/6 SR 3/6 SR 2/6       AT EVAL  AROM: CERVICAL   Flexion 58*   Extension 20* (greater than flexion)   Right side bending 27   Left side bending 16   Right rotation 32*   Left rotation 42     AT 9/25/18:  AROM: CERVICAL   Flexion WFL   Extension 30   Right side bending 30   Left side bending 30   Right rotation 50   Left rotation 35 (painful)     Cervical AROM: Pain/Dysfunction with Movement:   Flexion    Extension    Right side bending    Left side bending    Right rotation    Left rotation      Shoulder  @ IE 8/2/18   Right     Left   Pain/Dysfunction with Movement     AROM PROM MMT AROM PROM MMT     flexion WFL WFL 3+/5* 110 WFL 4+/5     extension WFL WFL 4-/5 WFL WFL 4/5     abduction 160 WFL 4-/5 125 WFL 4+/5     adduction WFL WFL 4/5 WFL WFL 4+/5     Internal rotation WFL WFL 5/5 WFL WFL 5/5     ER at 0° abd WFL WFL 5/5* WFL WFL 5/5           Shoulder  9/25/17  Right Left Pain/Dysfunction with Movement    MMT MMT    flexion 3+/5 4+/5    extension 4/5 4/5    abduction 4-/5 4/5    Internal rotation 4/5 4+/5    ER at 0° abd 4/5 4+/5      Deep Neck Flexor Strength test: < 2second hold no incline with reports of eftl sided scapula pain.    PIVMs: painful/hypomobile L C4 downlside      Home Exercises Provided and Patient Education Provided     Education provided:   - cont HEP " "regularly    Written Home Exercises Provided: Patient instructed to cont prior HEP.  Exercises were reviewed and Luann was able to demonstrate them prior to the end of the session.  Luann demonstrated good  understanding of the education provided.     See EMR under Patient Instructions for exercises provided prior visit.    Assessment     Patient demonstrated multiple bouts of loss of sitting static balance requiring moderate tactile cueing for correction. When asked what happened her response was "I don't know" each time. Patient with reports of dizzy upone standing at end of session that resolved with sitting and patient was able to exit the clinic independently. Progress limited at this time. Patient is approaching max rehab potential.   Luann is progressing well towards her goals.   Pt prognosis is Excellent.     Pt will continue to benefit from skilled outpatient physical therapy to address the deficits listed in the problem list box on initial evaluation, provide pt/family education and to maximize pt's level of independence in the home and community environment.     Pt's spiritual, cultural and educational needs considered and pt agreeable to plan of care and goals.    Anticipated barriers to physical therapy: none    Goals:   Short Term Goals: 4 weeks  1. Pt will demo good deep neck flexor strength to improve cervical lordosis and stabilization. (NOT MET as of 9/25/18)  2. Increase cervical ROM by 5-10 degrees in order to perform ADLs with decreased difficulty. (NOT MET as of 9/25/18)  3. Pt will demo good sitting and standing posture for improved spine health and decreased neck pain. (NOT MET as of 9/25/18)  4. Pt to tolerate HEP to improve ROM and independence with ADL's (NOT MET as of 9/25/18)      Long Term Goals: 8 weeks  1. Report decreased neck pain </=1/10 with no episodes of pain >5/10 to increase tolerance for ADLs. (NOT MET as of 9/25/18)  2. Increase cervical AROM to WFL with min neck pain for " increased functional mobility. (NOT MET as of 9/25/18)  3. Increased strength in B shoulders/scapular stabilizers to >/= 4/5 MMT grade to increase tolerance for ADL and work activities. (NOT MET as of 9/25/18)  4. Pt will report at CK level (40-60% impaired) on FOTO neck score for neck pain disability to demonstrate decrease in disability and improvement in neck pain. (NT)       Plan     Cont POC to progress towards established goals. Consider DC next session    Chivo Higgins Jr, PT

## 2018-09-27 ENCOUNTER — CLINICAL SUPPORT (OUTPATIENT)
Dept: REHABILITATION | Facility: HOSPITAL | Age: 63
End: 2018-09-27
Payer: MEDICARE

## 2018-09-27 DIAGNOSIS — R29.898 DECREASED ROM OF NECK: ICD-10-CM

## 2018-09-27 DIAGNOSIS — M62.81 DECREASED MUSCLE STRENGTH: ICD-10-CM

## 2018-09-27 DIAGNOSIS — M54.2 NECK PAIN: ICD-10-CM

## 2018-09-27 PROCEDURE — G8979 MOBILITY GOAL STATUS: HCPCS | Mod: CK,PN

## 2018-09-27 PROCEDURE — 97110 THERAPEUTIC EXERCISES: CPT | Mod: PN

## 2018-09-27 PROCEDURE — G8980 MOBILITY D/C STATUS: HCPCS | Mod: CL,PN

## 2018-09-27 NOTE — PROGRESS NOTES
"  Physical Therapy Daily Treatment Note     Name: Mary Choi  Clinic Number: [id number]    Therapy Diagnosis:   Encounter Diagnoses   Name Primary?    Neck pain     Decreased muscle strength     Decreased ROM of neck      Physician: Anne Coleman PA-C  Visit Date: 9/27/2018    Physician Orders: PT eval and treat  Medical Diagnosis:   M47.812 (ICD-10-CM) - Cervical spondylosis without myelopathy   M50.30 (ICD-10-CM) - Degeneration of cervical intervertebral disc   M54.2 (ICD-10-CM) - Neck pain   Evaluation Date: 8/2/2018  Authorization Period Expiration: 10/2/2018  Plan of Care Certification Period: 8/2/18 to 10/2/18  Visit #/Visits authorized: 9/ 12     Time In: 3:00  Time Out: 3:45  Total Billable Time: 45 minutes (3TE)      Precautions:anxiety, arthritis, depression, HTN      Subjective     Pt reports: states that she woke up yesterday with increased neck pain and stiffness, and is the worse it has been. Pt reports that she still get the double vision that is more frequent, but does not know what to do since the MRI does not show anything. Pt reports pain was an 8/10 yesterday, and unsure what had happened; a little better today.  She was compliant with home exercise program.  Response to previous treatment: "I felt okay"  Functional change: none    Pain: 6/10   Location: left>right UT/neck      Objective     Luann received therapeutic exercises to develop strength and flexibility x  45 mins including tests and measures for discharge today.    DATE 9/27/18 9/25/18 9/11/18 9/4/18 8/28/18 8/23/18   VISIT 10/12 9/12 8/12 7/12 6/12 5/12   POC 10/2/18            G CODE 10/10 9/10 8/10 7/10 6/10 5/10   FOTO 10/10 9/10 8/10 71/0 done 5/5 Next   Cap Visit  Cap Total 100.54  1711.54 90  1611 118.42  1521.07  118.42  1402.65 57.78  1284.23 57.78  1226.456   MHP            MT  NT 15' 20' 15' 15'   Stretches:            UT stretch -- 20'' x 5 B MT MT MT MT   Supine:            Chin tuck 2x10 5'' holds 3x15 5'' " "hold 3x15 5" hold 3x10 5" hold 3x10 5" hold OOT   Chin tuck with iso c/s extension 15x5'' 15''x5 15x5" 15x5" 15x5" OOT   Isometric cervical extension  --  10x5" 10x5" OOT   DNF 10x5'' holds 1" hold      OOT   Cervical rotations 15x5'' 15x5'' 15x5" 10x5" 10x5" B OOT   Dowel flexion -- 2x15 2# 2x15 2# 2x15 2# 2x15 1# 2x12 1#   Serratus punches -- 2x15 2# 2x15 2# 2x15 2# 2x15 1# 2x12 1#   SL abd -- 1# 2x15 B 1# B 2x15 2x15 1# B 2x15 B OOT   SL gator --  1# B 2x15 2x15 1# B       SL ER -- 1# B 2x15 1# B 2x15 2x15 1# B 2x15 B OOT   Standing:            Rows -- -- GTB 2x15 RTB 2x20 RTB 2x20 RTB 2x15   Lat pull downs -- -- GTB 2x15 RTB 2x10 RTB 2x20 RTB 2x15   Wall slides -- -- 10x10" 10x10" 10x10" 10x10"   INITIALS SHAUN RS SR 5/6 MB 4/6 SR 3/6 SR 2/6       AT EVAL  AROM: CERVICAL   Flexion 58*   Extension 20* (greater than flexion)   Right side bending 27   Left side bending 16   Right rotation 32*   Left rotation 42     AT 9/25/18:  AROM: CERVICAL   Flexion WFL   Extension 30   Right side bending 30   Left side bending 30   Right rotation 50   Left rotation 35 (painful)     Shoulder Right Left Pain/Dysfunction with Movement    MMT MMT    flexion 3+/5 4+/5    extension 4/5 4/5    abduction 4-/5 4/5    Internal rotation 4/5 4+/5    ER at 0° abd 4/5 4+/5      9/27/18  AROM: CERVICAL   Flexion 39   Extension 24   Right side bending 25   Left side bending 28   Right rotation 43   Left rotation 40      Shoulder Right Left Pain/Dysfunction with Movement    MMT MMT    flexion 4+/5 3+/5    extension 4/5 4/5    abduction 4/5 4/5 L neck pain with L testing   Internal rotation 4+/5 4+/5    ER at 0° abd 4+/5 4/5 L neck pain with L testing     Home Exercises Provided and Patient Education Provided   Education provided:   - cont HEP regularly  Written Home Exercises Provided: Patient instructed to cont prior HEP.  Exercises were reviewed and Luann was able to demonstrate them prior to the end of the session.  Luann demonstrated good  " understanding of the education provided.   See EMR under Patient Instructions for exercises provided prior visit.      Outpatient Therapy Discharge Summary     Name: Mary Choi  Clinic Number: [id number]    Therapy Diagnosis:   Encounter Diagnoses   Name Primary?    Neck pain     Decreased muscle strength     Decreased ROM of neck      Physician: Anne Coleman PA-C    Physician Orders: Eval and treat  Medical Diagnosis:   M47.812 (ICD-10-CM) - Cervical spondylosis without myelopathy   M50.30 (ICD-10-CM) - Degeneration of cervical intervertebral disc   M54.2 (ICD-10-CM) - Neck pain     Evaluation Date: 8/2/18  Date of Last visit: 9/27/18  Total Visits Received: 10    Assessment    Goals:   Short Term Goals: 4 weeks  1. Pt will demo good deep neck flexor strength to improve cervical lordosis and stabilization. (NOT MET)  2. Increase cervical ROM by 5-10 degrees in order to perform ADLs with decreased difficulty. (NOT MET)  3. Pt will demo good sitting and standing posture for improved spine health and decreased neck pain. (NOT MET)  4. Pt to tolerate HEP to improve ROM and independence with ADL's (NOT MET)    Long Term Goals: 8 weeks  1. Report decreased neck pain </=1/10 with no episodes of pain >5/10 to increase tolerance for ADLs. (NOT MET)  2. Increase cervical AROM to WFL with min neck pain for increased functional mobility. (NOT MET)  3. Increased strength in B shoulders/scapular stabilizers to >/= 4/5 MMT grade to increase tolerance for ADL and work activities. (NOT MET)  4. Pt will report at CK level (40-60% impaired) on FOTO neck score for neck pain disability to demonstrate decrease in disability and improvement in neck pain. (NOT MET)      Discharge reason: Patient is medically unstable (she will see neurologist again for other health issues) and Patient has reached the maximum rehab potential for the present time    Plan   This patient is discharged from Physical Therapy       Plan     Cont  POC to progress towards established goals. Consider DC next session    Sudarshan Hinton, PT

## 2018-10-25 ENCOUNTER — TELEPHONE (OUTPATIENT)
Dept: SPINE | Facility: CLINIC | Age: 63
End: 2018-10-25

## 2018-10-25 NOTE — TELEPHONE ENCOUNTER
Called and spoke with patient.  She stated that she is having increased pain in her neck and stiffness.  She finished physical therapy 2 weeks ago.  She is not interested in the injections and would like to know what other medication options there are.    Please advise

## 2018-10-25 NOTE — TELEPHONE ENCOUNTER
----- Message from Jennifer Higgins sent at 10/25/2018  1:29 PM CDT -----  Contact: pt            Name of Who is Calling: pt      What is the request in detail: is returning a call      Can the clinic reply by [facility]: no      [facility]: [phone]

## 2018-10-25 NOTE — TELEPHONE ENCOUNTER
----- Message from Tami Reeder sent at 10/25/2018 10:35 AM CDT -----  Contact: ELZA  Name of Who is Calling:ELZA       What is the request in detail: Patient states she is having neck pains and she is requesting a call back to discuss her issues       Can the clinic reply by [facility]: no    What Number to Call Back if not in [facility]: [phone]

## 2018-10-26 NOTE — TELEPHONE ENCOUNTER
OTC NSAIDS or she can see pain management to discuss medication or injection recommendations    Anne Coleman, NIURKA, PA-C  Neurosurgery  [facility]  [facility]

## 2018-10-26 NOTE — TELEPHONE ENCOUNTER
Called and spoke with patient.  She staed that she is on a limited budget and cannot afford much over the counter.  Advised her that she could see Pain Management to see what they would advise.  Patient in agreement and appointment scheduled.

## 2018-11-05 ENCOUNTER — TELEPHONE (OUTPATIENT)
Dept: PAIN MEDICINE | Facility: CLINIC | Age: 63
End: 2018-11-05

## 2018-11-05 NOTE — TELEPHONE ENCOUNTER
----- Message from Shana Shukla sent at 11/5/2018 10:05 AM CST -----  Contact: Self [phone]  Patient would like to speak with you about coming in sooner due to pain. Patient would like a call back today. Please advise

## 2018-11-05 NOTE — TELEPHONE ENCOUNTER
Spoke with pt in regards to a sooner than 11/13/18. I informed pt we don't have anything sooner. Pt verbalized understanding.

## 2018-11-13 ENCOUNTER — TELEPHONE (OUTPATIENT)
Dept: PAIN MEDICINE | Facility: CLINIC | Age: 63
End: 2018-11-13

## 2018-11-13 ENCOUNTER — OFFICE VISIT (OUTPATIENT)
Dept: PAIN MEDICINE | Facility: CLINIC | Age: 63
End: 2018-11-13
Payer: MEDICARE

## 2018-11-13 VITALS
DIASTOLIC BLOOD PRESSURE: 71 MMHG | BODY MASS INDEX: 25.23 KG/M2 | HEART RATE: 87 BPM | WEIGHT: 147 LBS | SYSTOLIC BLOOD PRESSURE: 140 MMHG

## 2018-11-13 DIAGNOSIS — M54.2 CHRONIC NECK PAIN: Primary | ICD-10-CM

## 2018-11-13 DIAGNOSIS — G89.29 CHRONIC NECK PAIN: Primary | ICD-10-CM

## 2018-11-13 DIAGNOSIS — M47.812 SPONDYLOSIS OF CERVICAL REGION WITHOUT MYELOPATHY OR RADICULOPATHY: ICD-10-CM

## 2018-11-13 DIAGNOSIS — M50.30 DDD (DEGENERATIVE DISC DISEASE), CERVICAL: ICD-10-CM

## 2018-11-13 DIAGNOSIS — M47.812 FACET ARTHROPATHY, CERVICAL: Primary | ICD-10-CM

## 2018-11-13 PROCEDURE — 3008F BODY MASS INDEX DOCD: CPT | Mod: CPTII,S$GLB,, | Performed by: PAIN MEDICINE

## 2018-11-13 PROCEDURE — 3077F SYST BP >= 140 MM HG: CPT | Mod: CPTII,S$GLB,, | Performed by: PAIN MEDICINE

## 2018-11-13 PROCEDURE — 3078F DIAST BP <80 MM HG: CPT | Mod: CPTII,S$GLB,, | Performed by: PAIN MEDICINE

## 2018-11-13 PROCEDURE — 99204 OFFICE O/P NEW MOD 45 MIN: CPT | Mod: S$GLB,,, | Performed by: PAIN MEDICINE

## 2018-11-13 PROCEDURE — 99999 PR PBB SHADOW E&M-EST. PATIENT-LVL III: CPT | Mod: PBBFAC,,, | Performed by: PAIN MEDICINE
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
## 2018-11-13 NOTE — LETTER
November 18, 2018      Anne Coleman PA-C  [address]  [facility] [address]           [facility]  [address]  [facility] [address]  Phone: [phone]          Patient: Mary Choi   MR Number: [id number]   YOB: 1955   Date of Visit: 11/13/2018       Dear Anne Coleman:    Thank you for referring Mary Choi to me for evaluation. Attached you will find relevant portions of my assessment and plan of care.    If you have questions, please do not hesitate to call me. I look forward to following Mary Choi along with you.    Sincerely,    Ruben Espinoza Jr., MD    Enclosure  CC:  No Recipients    If you would like to receive this communication electronically, please contact [email or web address] or [phone] to request more information on [facility] access.    For providers and/or their staff who would like to refer a patient to [facility], please contact us through our one-stop-shop provider referral line, [facility], at [phone].    If you feel you have received this communication in error or would no longer like to receive these types of communications, please e-mail [email or web address]

## 2018-11-18 PROBLEM — G89.29 CHRONIC NECK PAIN: Status: ACTIVE | Noted: 2018-08-02

## 2018-11-18 PROBLEM — M50.30 DDD (DEGENERATIVE DISC DISEASE), CERVICAL: Status: ACTIVE | Noted: 2018-11-18

## 2018-11-18 PROBLEM — M47.812 SPONDYLOSIS OF CERVICAL REGION WITHOUT MYELOPATHY OR RADICULOPATHY: Status: ACTIVE | Noted: 2018-11-18

## 2018-11-19 ENCOUNTER — OFFICE VISIT (OUTPATIENT)
Dept: RHEUMATOLOGY | Facility: CLINIC | Age: 63
End: 2018-11-19
Payer: MEDICARE

## 2018-11-19 VITALS
DIASTOLIC BLOOD PRESSURE: 60 MMHG | BODY MASS INDEX: 27.78 KG/M2 | HEART RATE: 84 BPM | SYSTOLIC BLOOD PRESSURE: 135 MMHG | HEIGHT: 61 IN

## 2018-11-19 DIAGNOSIS — E55.9 VITAMIN D DEFICIENCY: ICD-10-CM

## 2018-11-19 DIAGNOSIS — R53.83 OTHER FATIGUE: ICD-10-CM

## 2018-11-19 DIAGNOSIS — M79.7 FIBROMYALGIA: Primary | ICD-10-CM

## 2018-11-19 DIAGNOSIS — R76.0 ANTIPHOSPHOLIPID ANTIBODY POSITIVE: ICD-10-CM

## 2018-11-19 PROCEDURE — 99999 PR PBB SHADOW E&M-EST. PATIENT-LVL III: CPT | Mod: PBBFAC,,, | Performed by: INTERNAL MEDICINE

## 2018-11-19 PROCEDURE — 3075F SYST BP GE 130 - 139MM HG: CPT | Mod: CPTII,S$GLB,, | Performed by: INTERNAL MEDICINE

## 2018-11-19 PROCEDURE — 3008F BODY MASS INDEX DOCD: CPT | Mod: CPTII,S$GLB,, | Performed by: INTERNAL MEDICINE

## 2018-11-19 PROCEDURE — 3078F DIAST BP <80 MM HG: CPT | Mod: CPTII,S$GLB,, | Performed by: INTERNAL MEDICINE

## 2018-11-19 PROCEDURE — 99214 OFFICE O/P EST MOD 30 MIN: CPT | Mod: S$GLB,,, | Performed by: INTERNAL MEDICINE

## 2018-11-19 ASSESSMENT — ROUTINE ASSESSMENT OF PATIENT INDEX DATA (RAPID3)
PAIN SCORE: 5.5
WHEN YOU AWAKENED IN THE MORNING OVER THE LAST WEEK, PLEASE INDICATE THE AMOUNT OF TIME IT TAKES UNTIL YOU ARE AS LIMBER AS YOU WILL BE FOR THE DAY: 10-15 MINUTES
TOTAL RAPID3 SCORE: 6.05
PATIENT GLOBAL ASSESSMENT SCORE: 8
FATIGUE SCORE: 9
MDHAQ FUNCTION SCORE: 1.4
PSYCHOLOGICAL DISTRESS SCORE: 3.3
AM STIFFNESS SCORE: 1, YES

## 2018-11-19 NOTE — PROGRESS NOTES
Subjective:       Patient ID: Mary Choi is a 62 y.o. female.    Chief Complaint: Fibromyalgia    HPI:  Mary Choi is a 62 y.o. female sent initially in consultation by Dr. Melinda Paul regarding possible antiphospholipid antibody syndrome. The patient has had since 2002 several episodes of loss of balance, distorted vision, and a electrical spasms in the legs he go back and forth. She was seen by Dr. Bryant in neurology who sent her to Dr. Paul. She also saw the rheumatologist Dr. Metzger who did lab work and rules out evidence of Lupus. She had an MRI done that showed evidence of nonspecific white matter T2 changes that could be ischemic versus demyelinating disorder. She has a history of oligoclonal band positivity in the CSF. She had antiphospholipid antibody studies done and on 2 separate occasions was found to have a positive anticardiolipin IgM and IgG. She was also found to have a weakly positive anticardiolipin antibody. She did not fit criteria for APLS.     Interval History:    Right 3rd finger stiff and has to be cracked.  OTC cream for arthritis and glove helps.  Will have injection in neck.  Right foot cramps 2-3x a month.  She relates it to walking when shopping.      Dr. West recommended she see psychology and psychiatry due to dull dizziness that makes her feel like she can not balance.  She has had dizziness with walking her dog.     She has fibromyalgia.  She feels gabapentin 300 mg noon and 600 mg at night helps leg pains.    Review of Systems   Constitutional: Positive for fatigue and fever.   HENT:        Dry mouth   Eyes:        Dry eyes   Respiratory: Positive for cough.    Cardiovascular: Negative.    Gastrointestinal: Positive for diarrhea.   Endocrine: Negative.    Genitourinary: Negative.    Musculoskeletal: Positive for arthralgias and myalgias.   Hematological: Bruises/bleeds easily.   Psychiatric/Behavioral: Negative.          Objective:   /60   Pulse 84   Ht  "5' 1" (1.549 m)   BMI 27.78 kg/m²      Physical Exam   Constitutional: She is oriented to person, place, and time and well-developed, well-nourished, and in no distress.   HENT:   Head: Normocephalic and atraumatic.   Eyes: Conjunctivae and EOM are normal.   Neck: Neck supple.   Cardiovascular: Normal rate, regular rhythm and normal heart sounds.    Pulmonary/Chest: Effort normal and breath sounds normal.   Abdominal: Soft. Bowel sounds are normal.   Neurological: She is alert and oriented to person, place, and time. Gait normal.   Skin: Skin is warm and dry.     Psychiatric: Mood and affect normal.   Musculoskeletal:   8/18 FM points painful       LABS    Component      Latest Ref Rng & Units 6/13/2018 6/12/2018   Anti-SSA Antibody      <1.0 NEG AI <1.0 NEG    Anti-SSB Antibody      <1.0 NEG AI <1.0 NEG    VASILE PATTERN       HOMOGENEOUS (A)    VASILE TITER      titer 1:80 (H)    TSH      0.400 - 4.000 uIU/mL  1.233   T3, Total      60 - 180 ng/dL  72   Thyroperoxidase Antibodies      <6.0 IU/mL  <6.0   Creatine Kinase, Total      29 - 143 U/L 66    VASILE      NEGATIVE POSITIVE (A)    Cyclic Citrullinated Peptide (CCP) Ab (IgG)      UNITS <16    Rheumatoid Factor      <14 IU/mL <14    CRP      <8.0 mg/L 0.3    Aldolase      < OR = 8.1 U/L 5.2           Assessment:       1. 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 Patient without obvious clinical event to help confirm diagnosis.  2. Arthralgias.  OA of knee s/p viscosupplementation  3. History of positive VASILE   4. Firm nodules on arms and back diagnoses as fatty deposits.  Dermatology 5/2018 diagnosed with lipoma.   5. Depression. Not currently on any treatment.   In the past she was unable Effexor, Cymbalta and Ativan. She also used Buspar.  6. Fatigue  7.  Fibromyalgia.  Patient with multiple symptoms. WPI 16 and SS 7 in the past which does fit with fibromyalgia    8.  Low normal vitamin D.  Managed by primary doctor.  Patient " noncompliant with vitamin D 4,000 daily recommended by primary doctor.   9.  Overweight.  Encouraged to go to gym 3 x a week.      Plan:       1. No evidence of lupus or rheumatoid arthritis by history or exam.    Will continue monitor periodically especially with family history of lupus in a sister.    2. Follow up with pain management  3. Patient to consider proceed with aspirin therapy as recommended by .  4. Patient to start water aerobics 1-2x a week.     5. Patient declined glucosamine and chondroitin due to cost  6. Continue gabapentin          Return to the office in 6 months/prn  Patient seen face to face for 25 minutes and greater than 50% spent in counseling regarding fibromyalgia.

## 2018-11-29 ENCOUNTER — TELEPHONE (OUTPATIENT)
Dept: PAIN MEDICINE | Facility: CLINIC | Age: 63
End: 2018-11-29

## 2018-11-29 NOTE — TELEPHONE ENCOUNTER
Spoke with pt regarding her upcoming procedure with Dr. Espinoza. Pt stated she would like to reschedule her procedure scheduled for 12/5/18 and reschedule for 1/10/18. I informed pt her procedure was rescheduled. Pt verbalized understanding and confirmed procedure date.

## 2018-11-29 NOTE — TELEPHONE ENCOUNTER
----- Message from Dahiana Mendiola sent at 11/29/2018 10:33 AM CST -----  Contact: [phone]/self  Patient states she is returning your call. Please advise.

## 2018-11-29 NOTE — TELEPHONE ENCOUNTER
----- Message from Aicha Gilbert sent at 11/29/2018  9:34 AM CST -----  Contact: [phone]/self  Patient requesting to speak with you regarding rescheduling her appointment. Please advise.

## 2019-01-09 ENCOUNTER — TELEPHONE (OUTPATIENT)
Dept: PAIN MEDICINE | Facility: CLINIC | Age: 64
End: 2019-01-09

## 2019-01-29 ENCOUNTER — TELEPHONE (OUTPATIENT)
Dept: NEUROSURGERY | Facility: CLINIC | Age: 64
End: 2019-01-29

## 2019-01-29 NOTE — TELEPHONE ENCOUNTER
Returned call pt stated that she wantedto try PT that was recommended by her neurologist before having the injection done she will call back to r/s if Pt doesn't help.         ----- Message from Cathy Dyson sent at 1/29/2019 12:20 PM CST -----  Contact: self/[phone]  Patient called to speak with your office about the procedure and something else that she wants to try before going through with it.    Please call and advise.

## 2019-04-10 ENCOUNTER — OFFICE VISIT (OUTPATIENT)
Dept: PAIN MEDICINE | Facility: CLINIC | Age: 64
End: 2019-04-10
Payer: MEDICARE

## 2019-04-10 ENCOUNTER — TELEPHONE (OUTPATIENT)
Dept: PAIN MEDICINE | Facility: CLINIC | Age: 64
End: 2019-04-10

## 2019-04-10 VITALS — WEIGHT: 149.69 LBS | BODY MASS INDEX: 28.28 KG/M2

## 2019-04-10 DIAGNOSIS — G89.4 CHRONIC PAIN SYNDROME: ICD-10-CM

## 2019-04-10 DIAGNOSIS — M50.30 DDD (DEGENERATIVE DISC DISEASE), CERVICAL: ICD-10-CM

## 2019-04-10 DIAGNOSIS — G89.29 CHRONIC NECK PAIN: ICD-10-CM

## 2019-04-10 DIAGNOSIS — M47.812 FACET ARTHROPATHY, CERVICAL: ICD-10-CM

## 2019-04-10 DIAGNOSIS — M47.812 SPONDYLOSIS OF CERVICAL REGION WITHOUT MYELOPATHY OR RADICULOPATHY: Primary | ICD-10-CM

## 2019-04-10 DIAGNOSIS — M54.2 CHRONIC NECK PAIN: ICD-10-CM

## 2019-04-10 PROCEDURE — 3008F BODY MASS INDEX DOCD: CPT | Mod: CPTII,S$GLB,, | Performed by: NURSE PRACTITIONER

## 2019-04-10 PROCEDURE — 99999 PR PBB SHADOW E&M-EST. PATIENT-LVL III: ICD-10-PCS | Mod: PBBFAC,,, | Performed by: NURSE PRACTITIONER

## 2019-04-10 PROCEDURE — 99214 OFFICE O/P EST MOD 30 MIN: CPT | Mod: S$GLB,,, | Performed by: NURSE PRACTITIONER

## 2019-04-10 PROCEDURE — 99999 PR PBB SHADOW E&M-EST. PATIENT-LVL III: CPT | Mod: PBBFAC,,, | Performed by: NURSE PRACTITIONER

## 2019-04-10 PROCEDURE — 99214 PR OFFICE/OUTPT VISIT, EST, LEVL IV, 30-39 MIN: ICD-10-PCS | Mod: S$GLB,,, | Performed by: NURSE PRACTITIONER

## 2019-04-10 PROCEDURE — 3008F PR BODY MASS INDEX (BMI) DOCUMENTED: ICD-10-PCS | Mod: CPTII,S$GLB,, | Performed by: NURSE PRACTITIONER
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
## 2019-04-10 NOTE — TELEPHONE ENCOUNTER
Spoke with pt regarding neck pain. Pt stated she's having neck pain and would like pain medication. I explained to pt she has not been seen in a couple of months and she would need to be seen in the office to further discuss. Pt is scheduled to see Rahul Stewart today for 3:30 pm. Pt verbalized understanding and confirmed appt date and time.

## 2019-04-12 ENCOUNTER — TELEPHONE (OUTPATIENT)
Dept: PAIN MEDICINE | Facility: CLINIC | Age: 64
End: 2019-04-12

## 2019-04-12 DIAGNOSIS — M47.812 FACET ARTHROPATHY, CERVICAL: Primary | ICD-10-CM

## 2019-04-29 ENCOUNTER — TELEPHONE (OUTPATIENT)
Dept: PAIN MEDICINE | Facility: CLINIC | Age: 64
End: 2019-04-29

## 2019-04-29 NOTE — TELEPHONE ENCOUNTER
----- Message from Kathi Raygoza sent at 4/29/2019 10:06 AM CDT -----  Contact: self, [phone]  Patient called in returning your call. Please advise.     Statement Selected

## 2019-04-29 NOTE — DISCHARGE INSTRUCTIONS
Home Care Instructions Pain Management:    1.  DIET:    You may resume your normal diet today.    2.  BATHING:    You may shower with luke warm water.    3.  DRESSING:    You may remove your bandage today.    4.  ACTIVITY LEVEL:      You may resume your normal activities 24 hours after your procedure.    5.  MEDICATIONS:    You may resume your normal medications today.    6.  SPECIAL INSTRUCTIONS:    No heat to the injection site for 24 hours including bath or shower, heating pad, moist heat or hot tubs.    Use an ice pack to the injection site for any pain or discomfort.  Apply ice packs for 20 minute intervals as needed.    If you have received any sedatives by mouth today, you can not drive for 12 hours.    If you have received sedation through an IV, you can not drive for 24 hours.    PLEASE CALL YOUR DOCTOR FOR THE FOLLOWIN.  Redness or swelling around the injection site.  2.  Fever of 101 degrees.  3.  Drainage (pus) from the injection site.  4.  For any continuous bleeding (some dried blood over the incision is normal.)    FOR EMERGENCIES:    If any unusual problems or difficulties occur during clinic hours, call [phone] or dial [phone].    Follow up with with your physician in 2-3 weeks.

## 2019-04-29 NOTE — TELEPHONE ENCOUNTER
----- Message from Za Farmer sent at 4/29/2019  9:23 AM CDT -----  Contact: [phone]  Patient requested to speak with the nurse because no one has called to let her know how early she needs to arrive tomorrow and advised she has been taking Asprins for the last two days. Please call.

## 2019-04-29 NOTE — TELEPHONE ENCOUNTER
Spoke with pt regarding her time of arrival. I informed pt the scheduling dept will contact her today no later than 3 pm. Pt verbalized understanding.

## 2019-04-30 ENCOUNTER — HOSPITAL ENCOUNTER (OUTPATIENT)
Facility: HOSPITAL | Age: 64
Discharge: HOME | End: 2019-04-30
Attending: PAIN MEDICINE | Admitting: PAIN MEDICINE
Payer: MEDICARE

## 2019-04-30 VITALS
HEIGHT: 61 IN | BODY MASS INDEX: 27.75 KG/M2 | HEART RATE: 64 BPM | TEMPERATURE: 98 F | WEIGHT: 147 LBS | OXYGEN SATURATION: 97 % | SYSTOLIC BLOOD PRESSURE: 145 MMHG | RESPIRATION RATE: 15 BRPM | DIASTOLIC BLOOD PRESSURE: 67 MMHG

## 2019-04-30 DIAGNOSIS — G89.29 CHRONIC PAIN: ICD-10-CM

## 2019-04-30 DIAGNOSIS — M47.812 SPONDYLOSIS OF CERVICAL REGION WITHOUT MYELOPATHY OR RADICULOPATHY: Primary | ICD-10-CM

## 2019-04-30 PROCEDURE — 25500020 PHARM REV CODE 255: Performed by: PAIN MEDICINE

## 2019-04-30 PROCEDURE — 64490 PR INJ DX/THER AGNT PARAVERT FACET JOINT,IMG GUIDE,CERV/THORAC, 1ST LEVEL: ICD-10-PCS | Mod: 50,,, | Performed by: PAIN MEDICINE

## 2019-04-30 PROCEDURE — 64490 INJ PARAVERT F JNT C/T 1 LEV: CPT | Mod: 50 | Performed by: PAIN MEDICINE

## 2019-04-30 PROCEDURE — 64490 INJ PARAVERT F JNT C/T 1 LEV: CPT | Mod: 50,,, | Performed by: PAIN MEDICINE

## 2019-04-30 PROCEDURE — 63600175 PHARM REV CODE 636 W HCPCS: Performed by: PAIN MEDICINE

## 2019-04-30 PROCEDURE — 99152 MOD SED SAME PHYS/QHP 5/>YRS: CPT | Performed by: PAIN MEDICINE

## 2019-04-30 PROCEDURE — 99152 PR MOD CONSCIOUS SEDATION, SAME PHYS, 5+ YRS, FIRST 15 MIN: ICD-10-PCS | Mod: ,,, | Performed by: PAIN MEDICINE

## 2019-04-30 PROCEDURE — 25000003 PHARM REV CODE 250: Performed by: PAIN MEDICINE

## 2019-04-30 PROCEDURE — 64491 PR INJ DX/THER AGNT PARAVERT FACET JOINT,IMG GUIDE,CERV/THORAC, 2ND LEVEL: ICD-10-PCS | Mod: 50,,, | Performed by: PAIN MEDICINE

## 2019-04-30 PROCEDURE — 64492 INJ PARAVERT F JNT C/T 3 LEV: CPT | Mod: 50 | Performed by: PAIN MEDICINE

## 2019-04-30 PROCEDURE — 99152 MOD SED SAME PHYS/QHP 5/>YRS: CPT | Mod: ,,, | Performed by: PAIN MEDICINE

## 2019-04-30 PROCEDURE — 64491 INJ PARAVERT F JNT C/T 2 LEV: CPT | Mod: 50,,, | Performed by: PAIN MEDICINE

## 2019-04-30 PROCEDURE — 64491 INJ PARAVERT F JNT C/T 2 LEV: CPT | Mod: 50 | Performed by: PAIN MEDICINE

## 2019-04-30 RX ORDER — LIDOCAINE HYDROCHLORIDE 10 MG/ML
1 INJECTION INFILTRATION; PERINEURAL ONCE
Status: DISCONTINUED | OUTPATIENT
Start: 2019-04-30 | End: 2019-04-30 | Stop reason: HOSPADM

## 2019-04-30 RX ORDER — LIDOCAINE HYDROCHLORIDE 10 MG/ML
INJECTION, SOLUTION EPIDURAL; INFILTRATION; INTRACAUDAL; PERINEURAL
Status: DISCONTINUED | OUTPATIENT
Start: 2019-04-30 | End: 2019-04-30 | Stop reason: HOSPADM

## 2019-04-30 RX ORDER — FENTANYL CITRATE 50 UG/ML
INJECTION, SOLUTION INTRAMUSCULAR; INTRAVENOUS
Status: DISCONTINUED | OUTPATIENT
Start: 2019-04-30 | End: 2019-04-30 | Stop reason: HOSPADM

## 2019-04-30 RX ORDER — MIDAZOLAM HYDROCHLORIDE 1 MG/ML
INJECTION, SOLUTION INTRAMUSCULAR; INTRAVENOUS
Status: DISCONTINUED | OUTPATIENT
Start: 2019-04-30 | End: 2019-04-30 | Stop reason: HOSPADM

## 2019-04-30 RX ORDER — SODIUM CHLORIDE 9 MG/ML
500 INJECTION, SOLUTION INTRAVENOUS CONTINUOUS
Status: DISCONTINUED | OUTPATIENT
Start: 2019-04-30 | End: 2019-04-30 | Stop reason: HOSPADM

## 2019-04-30 RX ORDER — BUPIVACAINE HYDROCHLORIDE 2.5 MG/ML
INJECTION, SOLUTION EPIDURAL; INFILTRATION; INTRACAUDAL
Status: DISCONTINUED | OUTPATIENT
Start: 2019-04-30 | End: 2019-04-30 | Stop reason: HOSPADM

## 2019-04-30 RX ORDER — INDOMETHACIN 25 MG/1
CAPSULE ORAL
Status: DISCONTINUED | OUTPATIENT
Start: 2019-04-30 | End: 2019-04-30 | Stop reason: HOSPADM

## 2019-04-30 RX ADMIN — SODIUM CHLORIDE 500 ML: 0.9 INJECTION, SOLUTION INTRAVENOUS at 12:04

## 2019-04-30 NOTE — OP NOTE
Procedure Note    Pre-operative diagnosis: Cervical Spondylosis  Post-operative diagnosis: Cervical Spondylosis  Procedure Date: 04/30/2019  Procedure:  (1) BILATERAL C3,4,5 Cervical Medial Branch Block     (2) Procedural Fluoroscopy    (3) IV Moderate Sedation (Midazolam 2 mg, Fentanyl 75 mcg)    Indications: Diagnostic block in the treatment of cervical spondylosis.      Procedure in detail:  Informed consent obtained after explaining the procedure and potential complications including local discomfort, infection, headache, temporary or permanent weakness and/or numbness of one or both legs, temporary or permanent paraplegia, heart attack and stroke. All questions were answered.      The patient was taken to the procedure room and placed in prone position.  Time out was performed.  Patient's neck/cervical spine area was prepped with Chloraprep and draped in a sterile manner.    AP fluoroscopy was used to identify and adalgisa the waists of the mid-articular pillars of the C3, 4, and 5 on the RIGHT side.  The skin and subcutaneous tissues overlying the target areas was infiltrated with 1mL of Lidocaine 1% using a 25g 1.5 inch needle.  Then, under AP fluoroscopic guidance, a 22 G 3.5 inch spinal needle, was advanced to the targeted points corresponding with the locations of the targeted medial branch nerves. Once os was encountered, lateral fluoroscopic views were obtained and the needle was advanced to the centroid of the facets at each level.      After heme-negative aspiration, 0.5 mL of 0.5% bupivacaine was injected at each of the above targeted points: corresponding to the locations of the targeted medial branch nerves.     The same procedure was performed on the opposite side.    Needle was removed and bandaged placed over site of needle insertion.      Estimated Blood Loss: nil    Specimens: None    Disposition: The patient tolerated the procedure without complaint and was transported to the recovery room in  stable condition.    Follow-up: RTC as scheduled      Ruben Espinoza Jr, MD  Interventional Pain Medicine / Anesthesiology

## 2019-04-30 NOTE — DISCHARGE SUMMARY
[facility]  Discharge Note  Short Stay     Admit Date: 4/30/2019    Discharge Date: 4/30/2019     Attending Physician: Ruben Espinoza Jr, MD    Diagnoses:  Active Hospital Problems    Diagnosis  POA    *Spondylosis of cervical region without myelopathy or radiculopathy [M47.812]  Yes    Chronic pain [G89.29]  Yes      Resolved Hospital Problems   No resolved problems to display.     Discharged Condition: Good     Hospital Course: Patient was admitted for an outpatient interventional pain management procedure and tolerated the procedure well with no complications.     Final Diagnoses: Same as principal problem.     Disposition: Home or Self Care     Follow up/Patient Instructions:    Follow-up Information     Ruben Espinoza Jr, MD. Go in 1 week.    Specialty:  Pain Medicine  Why:  Post-procedural Follow Up As Scheduled, To Report Your Pain Scores from the Pain Diary  Contact information:  [address]  [address]  [place] [address]  [phone]                   Reconciled Medications:     Medication List      CONTINUE taking these medications    aspirin 81 MG EC tablet  Commonly known as:  ECOTRIN  Take 81 mg by mouth Daily.     baclofen 10 MG tablet  Commonly known as:  LIORESAL  TAKE 2 TABLETS BY MOUTH TWICE DAILY     cholecalciferol (vitamin D3) 1,000 unit capsule  Commonly known as:  VITAMIN D3  Take 1,000 mg by mouth Daily.     diclofenac sodium 1 % Gel  Commonly known as:  VOLTAREN  Apply 2 g topically 4 (four) times daily.     ESTRACE 0.01 % (0.1 mg/gram) vaginal cream  Generic drug:  estradiol  Place vaginally once daily.     gabapentin 300 MG capsule  Commonly known as:  NEURONTIN  TAKE 1 CAPSULE BY MOUTH EVERY MORNING AND TAKE 2 CAPSULES BY MOUTH AT NIGHT     losartan 100 MG tablet  Commonly known as:  COZAAR  Take 100 mg by mouth once daily.     raloxifene 60 mg tablet  Commonly known as:  EVISTA  Take 60 mg by mouth once daily.     rOPINIRole 0.5 MG tablet  Commonly known as:   REQUIP  Take 0.5 mg by mouth Daily.     SYNTHROID 125 MCG tablet  Generic drug:  levothyroxine     ZOSTAVAX (PF) 19,400 unit/0.65 mL injection  Generic drug:  zoster vaccine live (PF)           Discharge Procedure Orders (must include Diet, Follow-up, Activity)   Call MD for:  temperature >100.4     Call MD for:  severe uncontrolled pain     Call MD for:  redness, tenderness, or signs of infection (pain, swelling, redness, odor or green/yellow discharge around incision site)     Call MD for:  difficulty breathing or increased cough     Call MD for:  severe persistent headache     Call MD for:  worsening rash     Remove dressing in 24 hours       Ruben Espinoza Jr, MD  Interventional Pain Medicine / Anesthesiology

## 2019-05-07 ENCOUNTER — OFFICE VISIT (OUTPATIENT)
Dept: PAIN MEDICINE | Facility: CLINIC | Age: 64
End: 2019-05-07
Payer: MEDICARE

## 2019-05-07 VITALS
WEIGHT: 149.81 LBS | BODY MASS INDEX: 28.28 KG/M2 | DIASTOLIC BLOOD PRESSURE: 77 MMHG | HEIGHT: 61 IN | SYSTOLIC BLOOD PRESSURE: 123 MMHG | HEART RATE: 71 BPM

## 2019-05-07 DIAGNOSIS — M54.2 CHRONIC NECK PAIN: ICD-10-CM

## 2019-05-07 DIAGNOSIS — M47.812 SPONDYLOSIS OF CERVICAL REGION WITHOUT MYELOPATHY OR RADICULOPATHY: Primary | ICD-10-CM

## 2019-05-07 DIAGNOSIS — G89.4 CHRONIC PAIN SYNDROME: ICD-10-CM

## 2019-05-07 DIAGNOSIS — M50.30 DDD (DEGENERATIVE DISC DISEASE), CERVICAL: ICD-10-CM

## 2019-05-07 DIAGNOSIS — G89.29 CHRONIC NECK PAIN: ICD-10-CM

## 2019-05-07 DIAGNOSIS — M47.812 FACET ARTHROPATHY, CERVICAL: ICD-10-CM

## 2019-05-07 PROCEDURE — 99213 PR OFFICE/OUTPT VISIT, EST, LEVL III, 20-29 MIN: ICD-10-PCS | Mod: S$GLB,,, | Performed by: NURSE PRACTITIONER

## 2019-05-07 PROCEDURE — 3008F PR BODY MASS INDEX (BMI) DOCUMENTED: ICD-10-PCS | Mod: CPTII,S$GLB,, | Performed by: NURSE PRACTITIONER

## 2019-05-07 PROCEDURE — 99999 PR PBB SHADOW E&M-EST. PATIENT-LVL III: ICD-10-PCS | Mod: PBBFAC,,, | Performed by: NURSE PRACTITIONER

## 2019-05-07 PROCEDURE — 99999 PR PBB SHADOW E&M-EST. PATIENT-LVL III: CPT | Mod: PBBFAC,,, | Performed by: NURSE PRACTITIONER

## 2019-05-07 PROCEDURE — 3008F BODY MASS INDEX DOCD: CPT | Mod: CPTII,S$GLB,, | Performed by: NURSE PRACTITIONER

## 2019-05-07 PROCEDURE — 99213 OFFICE O/P EST LOW 20 MIN: CPT | Mod: S$GLB,,, | Performed by: NURSE PRACTITIONER

## 2019-05-07 PROCEDURE — 3074F SYST BP LT 130 MM HG: CPT | Mod: CPTII,S$GLB,, | Performed by: NURSE PRACTITIONER

## 2019-05-07 PROCEDURE — 3078F PR MOST RECENT DIASTOLIC BLOOD PRESSURE < 80 MM HG: ICD-10-PCS | Mod: CPTII,S$GLB,, | Performed by: NURSE PRACTITIONER

## 2019-05-07 PROCEDURE — 3078F DIAST BP <80 MM HG: CPT | Mod: CPTII,S$GLB,, | Performed by: NURSE PRACTITIONER

## 2019-05-07 PROCEDURE — 3074F PR MOST RECENT SYSTOLIC BLOOD PRESSURE < 130 MM HG: ICD-10-PCS | Mod: CPTII,S$GLB,, | Performed by: NURSE PRACTITIONER
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
## 2019-08-09 DIAGNOSIS — M54.16 BILATERAL LUMBAR RADICULOPATHY: ICD-10-CM

## 2019-08-09 DIAGNOSIS — G62.9 NEUROPATHY: ICD-10-CM

## 2019-08-09 RX ORDER — GABAPENTIN 300 MG/1
CAPSULE ORAL
Qty: 270 CAPSULE | Refills: 3 | Status: SHIPPED | OUTPATIENT
Start: 2019-08-09

## 2019-10-10 ENCOUNTER — TELEPHONE (OUTPATIENT)
Dept: DERMATOLOGY | Facility: CLINIC | Age: 64
End: 2019-10-10

## 2019-10-10 NOTE — TELEPHONE ENCOUNTER
JONIM to inform pt she would need to schedule an appt for provider to look at site she is concerned about. The provider will make a decision if she can remove spot or if she needs to see general surgery.    NUBIA

## 2019-10-10 NOTE — TELEPHONE ENCOUNTER
----- Message from Fernando Stallings sent at 10/10/2019 11:36 AM CDT -----  Contact: Self   Pt would like to know if doctor does lipoma injections. And if so she would like to schedule an appt.     [phone]

## 2019-10-16 ENCOUNTER — TELEPHONE (OUTPATIENT)
Dept: DERMATOLOGY | Facility: CLINIC | Age: 64
End: 2019-10-16

## 2020-05-20 ENCOUNTER — PATIENT MESSAGE (OUTPATIENT)
Dept: NEUROLOGY | Facility: CLINIC | Age: 65
End: 2020-05-20

## 2020-05-21 ENCOUNTER — OFFICE VISIT (OUTPATIENT)
Dept: PSYCHIATRY | Facility: CLINIC | Age: 65
End: 2020-05-21
Payer: COMMERCIAL

## 2020-05-21 DIAGNOSIS — F43.0 STRESS DISORDER, ACUTE: Primary | ICD-10-CM

## 2020-05-21 PROCEDURE — 90791 PR PSYCHIATRIC DIAGNOSTIC EVALUATION: ICD-10-PCS | Mod: 95,,, | Performed by: PSYCHIATRY & NEUROLOGY

## 2020-05-21 PROCEDURE — 90791 PSYCH DIAGNOSTIC EVALUATION: CPT | Mod: 95,,, | Performed by: PSYCHIATRY & NEUROLOGY

## 2020-05-21 RX ORDER — BUSPIRONE HYDROCHLORIDE 5 MG/1
5 TABLET ORAL 2 TIMES DAILY
Qty: 60 TABLET | Refills: 3 | Status: SHIPPED | OUTPATIENT
Start: 2020-05-21 | End: 2020-06-11 | Stop reason: SINTOL
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
## 2020-06-08 ENCOUNTER — HOSPITAL ENCOUNTER (EMERGENCY)
Facility: HOSPITAL | Age: 65
Discharge: HOME | End: 2020-06-08
Attending: EMERGENCY MEDICINE
Payer: MEDICARE

## 2020-06-08 VITALS
SYSTOLIC BLOOD PRESSURE: 146 MMHG | OXYGEN SATURATION: 99 % | DIASTOLIC BLOOD PRESSURE: 67 MMHG | TEMPERATURE: 98 F | HEIGHT: 61 IN | BODY MASS INDEX: 27.38 KG/M2 | RESPIRATION RATE: 20 BRPM | WEIGHT: 145 LBS | HEART RATE: 82 BPM

## 2020-06-08 DIAGNOSIS — R56.9 SEIZURE-LIKE ACTIVITY: Primary | ICD-10-CM

## 2020-06-08 LAB
ANION GAP SERPL CALC-SCNC: 6 MMOL/L (ref 8–16)
BASOPHILS # BLD AUTO: 0.03 K/UL (ref 0–0.2)
BASOPHILS NFR BLD: 0.3 % (ref 0–1.9)
BILIRUB UR QL STRIP: NEGATIVE
BUN SERPL-MCNC: 15 MG/DL (ref 8–23)
CALCIUM SERPL-MCNC: 9.1 MG/DL (ref 8.7–10.5)
CHLORIDE SERPL-SCNC: 107 MMOL/L (ref 95–110)
CLARITY UR: CLEAR
CO2 SERPL-SCNC: 26 MMOL/L (ref 23–29)
COLOR UR: YELLOW
CREAT SERPL-MCNC: 0.9 MG/DL (ref 0.5–1.4)
DIFFERENTIAL METHOD: ABNORMAL
EOSINOPHIL # BLD AUTO: 0 K/UL (ref 0–0.5)
EOSINOPHIL NFR BLD: 0.2 % (ref 0–8)
ERYTHROCYTE [DISTWIDTH] IN BLOOD BY AUTOMATED COUNT: 12.8 % (ref 11.5–14.5)
EST. GFR  (AFRICAN AMERICAN): >60 ML/MIN/1.73 M^2
EST. GFR  (NON AFRICAN AMERICAN): >60 ML/MIN/1.73 M^2
GLUCOSE SERPL-MCNC: 102 MG/DL (ref 70–110)
GLUCOSE UR QL STRIP: NEGATIVE
HCT VFR BLD AUTO: 38.2 % (ref 37–48.5)
HGB BLD-MCNC: 12.7 G/DL (ref 12–16)
HGB UR QL STRIP: NEGATIVE
IMM GRANULOCYTES # BLD AUTO: 0.05 K/UL (ref 0–0.04)
IMM GRANULOCYTES NFR BLD AUTO: 0.5 % (ref 0–0.5)
KETONES UR QL STRIP: NEGATIVE
LACTATE SERPL-SCNC: 1.2 MMOL/L (ref 0.5–2.2)
LACTATE SERPL-SCNC: 2.3 MMOL/L (ref 0.5–2.2)
LEUKOCYTE ESTERASE UR QL STRIP: NEGATIVE
LYMPHOCYTES # BLD AUTO: 0.7 K/UL (ref 1–4.8)
LYMPHOCYTES NFR BLD: 6.7 % (ref 18–48)
MCH RBC QN AUTO: 29.8 PG (ref 27–31)
MCHC RBC AUTO-ENTMCNC: 33.2 G/DL (ref 32–36)
MCV RBC AUTO: 90 FL (ref 82–98)
MONOCYTES # BLD AUTO: 0.5 K/UL (ref 0.3–1)
MONOCYTES NFR BLD: 5.3 % (ref 4–15)
NEUTROPHILS # BLD AUTO: 8.8 K/UL (ref 1.8–7.7)
NEUTROPHILS NFR BLD: 87 % (ref 38–73)
NITRITE UR QL STRIP: NEGATIVE
NRBC BLD-RTO: 0 /100 WBC
PH UR STRIP: 5 [PH] (ref 5–8)
PLATELET # BLD AUTO: 93 K/UL (ref 150–350)
PMV BLD AUTO: 12.1 FL (ref 9.2–12.9)
POTASSIUM SERPL-SCNC: 3.7 MMOL/L (ref 3.5–5.1)
PROT UR QL STRIP: NEGATIVE
RBC # BLD AUTO: 4.26 M/UL (ref 4–5.4)
SODIUM SERPL-SCNC: 139 MMOL/L (ref 136–145)
SP GR UR STRIP: 1.01 (ref 1–1.03)
URN SPEC COLLECT METH UR: NORMAL
UROBILINOGEN UR STRIP-ACNC: NEGATIVE EU/DL
WBC # BLD AUTO: 10.05 K/UL (ref 3.9–12.7)

## 2020-06-08 PROCEDURE — 80048 BASIC METABOLIC PNL TOTAL CA: CPT

## 2020-06-08 PROCEDURE — 25000003 PHARM REV CODE 250: Performed by: EMERGENCY MEDICINE

## 2020-06-08 PROCEDURE — 99284 EMERGENCY DEPT VISIT MOD MDM: CPT | Mod: 25

## 2020-06-08 PROCEDURE — 81003 URINALYSIS AUTO W/O SCOPE: CPT

## 2020-06-08 PROCEDURE — 83605 ASSAY OF LACTIC ACID: CPT | Mod: 91

## 2020-06-08 PROCEDURE — 96360 HYDRATION IV INFUSION INIT: CPT

## 2020-06-08 PROCEDURE — 85025 COMPLETE CBC W/AUTO DIFF WBC: CPT

## 2020-06-08 RX ORDER — SODIUM CHLORIDE 9 MG/ML
1000 INJECTION, SOLUTION INTRAVENOUS
Status: COMPLETED | OUTPATIENT
Start: 2020-06-08 | End: 2020-06-08

## 2020-06-08 RX ADMIN — SODIUM CHLORIDE 1000 ML: 0.9 INJECTION, SOLUTION INTRAVENOUS at 12:06

## 2020-06-08 NOTE — ED NOTES
PT is alert and oriented. Denies complaints. Provided pt with extra blanket per request. Call bell in reach.

## 2020-06-08 NOTE — ED NOTES
Pt is alert and oriented. Pt states she was at [place] when she began to feel dizzy and she does not remember what happened after that. Pt states she has been experiencing dizziness for a few years but has never blacked out or had a seizure. EMS reports when they arrived pt was confused and combative when they arrived. Pt is now calm and cooperative. Pt was incontinent of diarrhea, assisted her in changing and cleaning herself up. Pt requests to throw away her shirt, jeans, and underwear as they have diarrhea on them. Pt states she has had diarrhea intermittently over the last couple of weeks but has not been concerned secondary to IBS hx. Pt denies head neck and back pain, chest pain, sob, n/v, dizziness at this time.

## 2020-06-08 NOTE — ED PROVIDER NOTES
Encounter Date: 6/8/2020       History     Chief Complaint   Patient presents with    Seizures     pt had a possible seizure at [facility] p[facility]. when ems arrived she was confused and combative. on arrival to the er pt is awake, alert, orientedx4. denies history of seizures.     64-year-old past medical history depression, arthritis, anxiety, acid reflux, hypertension, vestibular disease, migraines presenting with seizure-like activity.  As per EMS and patient patient was shopping prior to arrival and had acute episode of change in vision and dizziness.  As per EMS patient had seizure-like episode with mild combativeness while arrival on scene + stool incontinence.  On arrival to ED patient is awake alert.  Denies any recent fevers, headache, no history of seizures in past.   Patient mentions having year long history acute episodes of altered sensorium.  Patient seen by Neurology has had MRI, CHANG        Review of patient's allergies indicates:   Allergen Reactions    Ativan  [lorazepam]      Other reaction(s): Swelling    Duloxetine Nausea And Vomiting     Other reaction(s): Nausea     Past Medical History:   Diagnosis Date    Acid reflux     Anxiety     Arthritis     Cataract     Depression     Dry eyes     Dry mouth     Essential hypertension 5/8/2017    Rotator cuff tendinitis 4/3/2014    Thyroid disease     Ulcer      Past Surgical History:   Procedure Laterality Date    CHOLECYSTECTOMY      FRACTURE SURGERY      right ankle     INJECTION OF ANESTHETIC AGENT AROUND MEDIAL BRANCH NERVES INNERVATING CERVICAL FACET JOINT Bilateral 4/30/2019    Procedure: Cervical Medial Branch Block, Bilateral, C3,4,5;  Surgeon: Ruben Espinoza Jr., MD;  Location: [facility];  Service: Pain Management;  Laterality: Bilateral;  Pt instructed to hold ASA x 3 days prior to procedure.      SMALL INTESTINE SURGERY  2002    sub total gastiectomy     Family History   Problem Relation Age of Onset    Osteoarthritis  Mother     Depression Mother     Lupus Sister         sister is estranged from family    No Known Problems Father     Rheum arthritis Neg Hx     Psoriasis Neg Hx     Inflammatory bowel disease Neg Hx     Melanoma Neg Hx      Social History     Tobacco Use    Smoking status: Former Smoker     Last attempt to quit: 10/18/2005     Years since quittin.6    Smokeless tobacco: Never Used    Tobacco comment: Disability due to depression and other issues;     Substance Use Topics    Alcohol use: Yes     Comment: Social, rarely    Drug use: No     Comment: never     Review of Systems   Constitutional: Negative for fever.   HENT: Negative for congestion.    Respiratory: Negative for cough and shortness of breath.    Cardiovascular: Negative for chest pain.   Gastrointestinal: Negative for abdominal pain and nausea.   Genitourinary: Negative for dysuria.   Skin: Negative for color change.   Neurological: Positive for seizures. Negative for dizziness.       Physical Exam     Initial Vitals [20 1110]   BP Pulse Resp Temp SpO2   133/64 88 18 98.1 °F (36.7 °C) 97 %      MAP       --         Physical Exam    Constitutional: She appears well-developed and well-nourished. She is not diaphoretic. No distress.   HENT:   Head: Normocephalic and atraumatic.   Eyes: Conjunctivae and EOM are normal. Pupils are equal, round, and reactive to light. Right eye exhibits no discharge. Left eye exhibits no discharge. No scleral icterus.   Neck: Normal range of motion. Neck supple.   Cardiovascular: Normal rate and regular rhythm. Exam reveals no friction rub.    No murmur heard.  Pulmonary/Chest: Breath sounds normal. No respiratory distress. She has no wheezes. She has no rales.   Abdominal: Soft. Bowel sounds are normal. She exhibits no distension. There is no abdominal tenderness. There is no rebound and no guarding.   Musculoskeletal: Normal range of motion.   Neurological: She is alert and oriented to person, place,  and time. No cranial nerve deficit or sensory deficit. GCS score is 15. GCS eye subscore is 4. GCS verbal subscore is 5. GCS motor subscore is 6.   Normal rapid alternating movements  Negative Romberg  Normal finger-to-nose     Skin: Skin is warm and dry. No erythema. No pallor.         ED Course   Procedures  Labs Reviewed   CBC W/ AUTO DIFFERENTIAL - Abnormal; Notable for the following components:       Result Value    Platelets 93 (*)     Gran # (ANC) 8.8 (*)     Immature Grans (Abs) 0.05 (*)     Lymph # 0.7 (*)     Gran% 87.0 (*)     Lymph% 6.7 (*)     All other components within normal limits   BASIC METABOLIC PANEL - Abnormal; Notable for the following components:    Anion Gap 6 (*)     All other components within normal limits   LACTIC ACID, PLASMA - Abnormal; Notable for the following components:    Lactate (Lactic Acid) 2.3 (*)     All other components within normal limits   URINALYSIS, REFLEX TO URINE CULTURE    Narrative:     Preferred Collection Type->Urine, Clean Catch   LACTIC ACID, PLASMA          Imaging Results          CT Head Without Contrast (Final result)  Result time 06/08/20 12:09:27    Final result by Alvin Dodge MD (06/08/20 12:09:27)                 Impression:      No acute large vascular territory infarct or intracranial hemorrhage identified.      Electronically signed by: Alvin Dodge MD  Date:    06/08/2020  Time:    12:09             Narrative:    EXAMINATION:  CT HEAD WITHOUT CONTRAST    CLINICAL HISTORY:  Seizures new or progressive;    TECHNIQUE:  Low dose axial CT images obtained throughout the head without intravenous contrast. Sagittal and coronal reconstructions were performed.    COMPARISON:  MRI brain most recent 06/20/2018    FINDINGS:  Intracranial compartment:    Ventricles and sulci are normal in size for age without evidence of hydrocephalus. No extra-axial blood or fluid collections.    Mild degree of patchy hypoattenuation of the subcortical and periventricular  white matter, likely sequela of chronic small vessel ischemic change.  No parenchymal mass, hemorrhage, edema or major vascular distribution infarct.    Skull/extracranial contents (limited evaluation): No fracture. Mastoid air cells and paranasal sinuses are essentially clear.  Imaged portions of the orbits are within normal limits.                                 Medical Decision Making:   History:   Old Medical Records: I decided to obtain old medical records.  Initial Assessment:   64-year-old past medical history of seizure-like activity presenting with seizure-like activity.  Vital signs stable. PE unremarkable neurologically intact.  Differential Diagnosis:   DX includes seizures, electrolyte abnormality, UTI, intracranial bleed, intracranial mass  Clinical Tests:   Lab Tests: Ordered and Reviewed  Radiological Study: Ordered and Reviewed  ED Management:  Plan:  Obtain basic labs, CT head, reassess.                   ED Course as of Jun 08 1350   Mon Jun 08, 2020   1326 Patient very well-appearing neurologically intact awaiting UA and repeat lactate results will continue to reassess.    [DC]   1349 Repeat lactate normal patient very well-appearing will discharge home with follow-up instructions and return precautions workup negative patient advised to follow-up with neurologist.    [DC]      ED Course User Index  [DC] Best Sheldon Jr., MD                Clinical Impression:       ICD-10-CM ICD-9-CM   1. Seizure-like activity R56.9 780.39             ED Disposition Condition    Discharge Stable        ED Prescriptions     None        Follow-up Information    None                                    Best Sheldon Jr., MD  06/08/20 1351       Best Sheldon Jr., MD  08/01/20 [id number]

## 2020-06-08 NOTE — ED NOTES
Pt returned from CT. CT tech states pt IV came out while she was there, bandaged with gauze and coban.

## 2020-06-09 ENCOUNTER — PES CALL (OUTPATIENT)
Dept: ADMINISTRATIVE | Facility: CLINIC | Age: 65
End: 2020-06-09

## 2020-06-11 ENCOUNTER — OFFICE VISIT (OUTPATIENT)
Dept: PSYCHIATRY | Facility: CLINIC | Age: 65
End: 2020-06-11
Payer: COMMERCIAL

## 2020-06-11 DIAGNOSIS — F43.0 STRESS DISORDER, ACUTE: Primary | ICD-10-CM

## 2020-06-11 PROCEDURE — 99214 OFFICE O/P EST MOD 30 MIN: CPT | Mod: 95,,, | Performed by: PSYCHIATRY & NEUROLOGY

## 2020-06-11 PROCEDURE — 99214 PR OFFICE/OUTPT VISIT, EST, LEVL IV, 30-39 MIN: ICD-10-PCS | Mod: 95,,, | Performed by: PSYCHIATRY & NEUROLOGY

## 2020-06-11 RX ORDER — ALPRAZOLAM 0.25 MG/1
TABLET ORAL
Qty: 30 TABLET | Refills: 0 | Status: SHIPPED | OUTPATIENT
Start: 2020-06-11 | End: 2020-07-13 | Stop reason: SDUPTHER

## 2020-06-11 NOTE — PROGRESS NOTES
"Outpatient Psychiatry Follow-Up Visit (MD/NP)    06/11/2020 Phone visit.(patient could not get into [facility] system) 14"  Clinical Status of Patient:  Outpatient (Ambulatory)    Chief Complaint:  stress    Interval History and Content of Current Session:Patient still finds herself too irritable and easily angered which continues to embarrass and upset her. She denies depression or thoughts of harming herself. She had a recent dizzy spell, which she states she has suffered for many years, and that led to an ED visit. Patient is in good self control now and has no symptoms of delirium or psychosis or pradeep. She focused on her irritability. Patient could not take Buspar due to side effects of teeth clenching and tremors which are not now present based on her report.    Compliance:  good    Side effects:  teert clenching and shaking on Buspar which she stopped.  Musculoskeletal: patient has no abnormal motor movements at this time.    Risk Parameters:not active danger to self or others at this time.    Patient's Response to Intervention:accepting  Progress Toward Goals and Other Mental Status Changes:not good at this time     Vital signs this date were not reviewed.     Mental Status Evaluation     Appearance:  not assessed   Behavior:  cooperative                             Speech normal   Mood:  anxious   Affect:  anxious   Thought Process:  linear, logical   Thought Content:  organizednormal   Sensorium:  alert and oriented to person, place, time and situation   Attention Span & Concentration able to focus   Cognition:  Grossly intactgrossly intact   Insight:  has awareness of illness   Judgment:  behavior is adequate to circumstances       Diagnosis:    Stress disorder, acute [F43.0]      I reviewed the side effects of the patient's medicines and advised a call if the patient had problems with the medicine or clinical condition.    Plan:(Medication and Therapy Recommendation)  Additional Notes: Patient agrees to " start Xanax 0.125 mg bid prn stress. She has stopped the Buspar due to side effects.Return to Clinic:3 weeks

## 2020-07-02 ENCOUNTER — OFFICE VISIT (OUTPATIENT)
Dept: PSYCHIATRY | Facility: CLINIC | Age: 65
End: 2020-07-02
Payer: COMMERCIAL

## 2020-07-02 DIAGNOSIS — F43.0 STRESS DISORDER, ACUTE: Primary | ICD-10-CM

## 2020-07-02 PROCEDURE — 99214 PR OFFICE/OUTPT VISIT, EST, LEVL IV, 30-39 MIN: ICD-10-PCS | Mod: 95,,, | Performed by: PSYCHIATRY & NEUROLOGY

## 2020-07-02 PROCEDURE — 99214 OFFICE O/P EST MOD 30 MIN: CPT | Mod: 95,,, | Performed by: PSYCHIATRY & NEUROLOGY

## 2020-07-02 NOTE — PROGRESS NOTES
"Outpatient Psychiatry Follow-Up Visit (MD/NP)    07/02/2020 Phone visit..(10")    Clinical Status of Patient:  Outpatient (Ambulatory)    Chief Complaint:  Anxiety and stress.    Interval History and Content of Current Session:Patient remains nervous re the isolation due to the virus pandemic. 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 Patient is in good self control and has no active thoughts of harming herself or others. Patient is not taking the Xanax regularly and was advised to increase the dose to twice daily each day as an effort to reduce the anxiety.    Compliance:  fair    Side effects:none  Musculoskeletal:patient had no complaints of abnormal motor movements of any kind.      Risk Parameters:not active danger to self or others at this time.    Patient's Response to Intervention:accepting    Progress Toward Goals and Other Mental Status Changes:minimal     Vital signs this date were notreviewed.     Mental Status Evaluation     Appearance:  not assessed   Behavior:  cooperative                             Speech normal   Mood:  anxious   Affect:  anxious   Thought Process:  linear, logical   Thought Content:  organizednormal   Sensorium:  alert and oriented to person, place, time and situation   Attention Span & Concentration able to focus   Cognition:  Grossly intactgrossly intact   Insight:  has awareness of illness   Judgment:  behavior is adequate to circumstances       Diagnosis:    Stress disorder, acute [F43.0]      I reviewed the side effects of the patient's medicines and advised a call if the patient had problems with the medicine or clinical condition.    Plan:(Medication and Therapy Recommendation)  Additional Notes: Patient agrees to take Xanax 0.125 mg bid on regular basis.  Return to Clinic:6 weeks    "

## 2020-07-15 ENCOUNTER — OFFICE VISIT (OUTPATIENT)
Dept: NEUROLOGY | Facility: CLINIC | Age: 65
End: 2020-07-15
Payer: MEDICARE

## 2020-07-15 ENCOUNTER — TELEPHONE (OUTPATIENT)
Dept: NEUROLOGY | Facility: CLINIC | Age: 65
End: 2020-07-15

## 2020-07-15 VITALS
WEIGHT: 149.25 LBS | BODY MASS INDEX: 28.2 KG/M2 | DIASTOLIC BLOOD PRESSURE: 72 MMHG | HEART RATE: 66 BPM | SYSTOLIC BLOOD PRESSURE: 122 MMHG

## 2020-07-15 DIAGNOSIS — R56.9 SEIZURE: ICD-10-CM

## 2020-07-15 DIAGNOSIS — R26.89 IMBALANCE: ICD-10-CM

## 2020-07-15 DIAGNOSIS — H81.90 VESTIBULAR DISORDER, UNSPECIFIED LATERALITY: Primary | ICD-10-CM

## 2020-07-15 PROCEDURE — 99999 PR PBB SHADOW E&M-EST. PATIENT-LVL III: CPT | Mod: PBBFAC,,, | Performed by: PSYCHIATRY & NEUROLOGY

## 2020-07-15 PROCEDURE — 3074F PR MOST RECENT SYSTOLIC BLOOD PRESSURE < 130 MM HG: ICD-10-PCS | Mod: CPTII,S$GLB,, | Performed by: PSYCHIATRY & NEUROLOGY

## 2020-07-15 PROCEDURE — 3078F PR MOST RECENT DIASTOLIC BLOOD PRESSURE < 80 MM HG: ICD-10-PCS | Mod: CPTII,S$GLB,, | Performed by: PSYCHIATRY & NEUROLOGY

## 2020-07-15 PROCEDURE — 3008F BODY MASS INDEX DOCD: CPT | Mod: CPTII,S$GLB,, | Performed by: PSYCHIATRY & NEUROLOGY

## 2020-07-15 PROCEDURE — 3074F SYST BP LT 130 MM HG: CPT | Mod: CPTII,S$GLB,, | Performed by: PSYCHIATRY & NEUROLOGY

## 2020-07-15 PROCEDURE — 3008F PR BODY MASS INDEX (BMI) DOCUMENTED: ICD-10-PCS | Mod: CPTII,S$GLB,, | Performed by: PSYCHIATRY & NEUROLOGY

## 2020-07-15 PROCEDURE — 99999 PR PBB SHADOW E&M-EST. PATIENT-LVL III: ICD-10-PCS | Mod: PBBFAC,,, | Performed by: PSYCHIATRY & NEUROLOGY

## 2020-07-15 PROCEDURE — 99214 OFFICE O/P EST MOD 30 MIN: CPT | Mod: S$GLB,,, | Performed by: PSYCHIATRY & NEUROLOGY

## 2020-07-15 PROCEDURE — 3078F DIAST BP <80 MM HG: CPT | Mod: CPTII,S$GLB,, | Performed by: PSYCHIATRY & NEUROLOGY

## 2020-07-15 PROCEDURE — 99214 PR OFFICE/OUTPT VISIT, EST, LEVL IV, 30-39 MIN: ICD-10-PCS | Mod: S$GLB,,, | Performed by: PSYCHIATRY & NEUROLOGY

## 2020-07-15 NOTE — LETTER
July 15, 2020      Britney Hdz MD  [address]  [place] [address]           [facility]  [address]  [place] [address]  Phone: [phone]  Fax: [phone]          Patient: Mary Choi   MR Number: [id number]   YOB: 1955   Date of Visit: 7/15/2020       Dear Dr. Britney Hdz:    Thank you for referring Mary Choi to me for evaluation. Attached you will find relevant portions of my assessment and plan of care.    If you have questions, please do not hesitate to call me. I look forward to following Mary Choi along with you.    Sincerely,    Shiva West III, MD    Enclosure  CC:  No Recipients    If you would like to receive this communication electronically, please contact [email or web address] or [phone] to request more information on [facility] access.    For providers and/or their staff who would like to refer a patient to [facility], please contact us through our one-stop-shop provider referral line, [facility], at [phone].    If you feel you have received this communication in error or would no longer like to receive these types of communications, please e-mail [email or web address]

## 2020-07-15 NOTE — PROGRESS NOTES
Subjective:       Patient ID: Mary Choi is a 64 y.o. female.    Reason for Consult: Dizziness      Interval History:  Mary Choi is here for follow up. Their condition has returned.  Just like in 2016, in 2018 the patient had another episode of unexplainable dizziness.  The patient has a hard time describing it but feels as if she had a deep sensation of dizziness in her brain while at [facility].  This was unprovoked from any outside experience and she notes that she lost consciousness at that time.  Apparently she also had some possible seizure-like activity as witnessed by workers at [facility] and she was incontinent of bowel.  She had a CT scan at the counter [facility] which did not have any signs of intracranial bleed.  She has seen her psychiatrist since has tried to adjust some of her psychiatric medication.  Of note the patient is quite anxious and angry on today's visit with me.  The patient becomes tearful several times during our visit today.  I have spoken to her about are negative workups in the past including MRI MRA and EEG.  I have discussed that the bowel incontinence is particularly concerning to me today for organic causes of her spell.      Objective:     Vitals:    07/15/20 0951   BP: 122/72   Pulse: 66     Patient is awake alert oriented to person place and time.  Moves all 4 extremities against gravity.  Gait and station within normal limits.  Cranial nerves 2-12 were without focal deficits.  Focused examination was undertaken today. Over 50% of face to face time of 25 minute visit time was in giving guidance, counseling and discussing treatment options.    Results for orders placed or performed during the hospital encounter of 06/08/20   CT Head Without Contrast    Narrative    EXAMINATION:  CT HEAD WITHOUT CONTRAST    CLINICAL HISTORY:  Seizures new or progressive;    TECHNIQUE:  Low dose axial CT images obtained throughout the head without intravenous contrast. Sagittal and coronal  reconstructions were performed.    COMPARISON:  MRI brain most recent 06/20/2018    FINDINGS:  Intracranial compartment:    Ventricles and sulci are normal in size for age without evidence of hydrocephalus. No extra-axial blood or fluid collections.    Mild degree of patchy hypoattenuation of the subcortical and periventricular white matter, likely sequela of chronic small vessel ischemic change.  No parenchymal mass, hemorrhage, edema or major vascular distribution infarct.    Skull/extracranial contents (limited evaluation): No fracture. Mastoid air cells and paranasal sinuses are essentially clear.  Imaged portions of the orbits are within normal limits.      Impression    No acute large vascular territory infarct or intracranial hemorrhage identified.      Electronically signed by: Alvin Dodge MD  Date:    06/08/2020  Time:    12:09   Results for orders placed or performed during the hospital encounter of 06/20/18   MRA Brain without contrast    Narrative    EXAMINATION:  MRA BRAIN WITHOUT CONTRAST    CLINICAL HISTORY:  Stroke;Unspecified disorder of vestibular function, unspecified ear    TECHNIQUE:  3D Time-of-flight Images were performed over the brain.  Contrast was not administered    COMPARISON:  None    FINDINGS:  The petrous and the cavernous internal carotid arteries demonstrate no significant stenotic disease or occlusive disease or aneurysms.  The origins of the ophthalmic arteries are visualized best on the source images, without associated aneurysms or occlusive disease.    Bilateral dominant posterior communicating arteries with suggestion of fetal origins of the posterior cerebral arteries noted bilaterally.  Severely hypoplastic or absent P1 segments of the posterior cerebral arteries.  In the anterior circulation there is no significant stenotic disease of the middle or the anterior cerebral arteries.  No anterior circulation aneurysms noted.    In the posterior circulation there is a dominant  right vertebral artery.  The left vertebral artery is small in its caliber and appears to terminate in the left posterior inferior cerebellar artery.  The basilar artery is patent and small in its caliber.  The superior cerebellar arteries and the posterior cerebral arteries are unremarkable.      Impression    1. No significant occlusive disease of the intracranial vasculature.  2. Dominant posterior communicating arteries with suggestion of fetal origins of the posterior cerebral arteries bilaterally.  3. 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      Electronically signed by: Natalio Frank MD  Date:    06/20/2018  Time:    10:30   Results for orders placed or performed during the hospital encounter of 06/20/18   MRI Brain Without Contrast    Narrative    EXAMINATION:  MRI BRAIN WITHOUT CONTRAST    CLINICAL HISTORY:  Stroke; Unspecified disorder of vestibular function, unspecified ear    TECHNIQUE:  Multiplanar multisequence MR imaging of the brain was performed without contrast.    COMPARISON:  None.    FINDINGS:  Sagittal T1 weighted sequences not obtained.    There are symmetric lateral ventricles without hydrocephalus.  No abnormal extra-axial fluid collections or subdural hematomas or subarachnoid hemorrhage noted.    In the subcortical and deep periventricular white matter, mainly in the frontal lobes there are a few subcentimeter T2 hyperintensities without restricted diffusion or abnormal mass effects.  These most likely represent changes from chronic microvascular ischemia.  However these may be related also to migraine type syndromes.  No definite signs for acute infarctions or neoplasms or midline shift or parenchymal hemorrhage or herniations is noted.  Basal ganglia are symmetric and within normal limits.    In the posterior fossa there is a normal brainstem and cerebellar hemispheres and cisternal spaces.  There is a small caliber basilar artery with findings  suggestive of dominant bilateral posterior communicating arteries.  This may represent a congenital variant.  The flow void of the major vessels however is within normal limits.    There is no Chiari type malformations.  The ocular globes are symmetric and within normal limits.  Paranasal air sinuses are clear.      Impression    1. Few scattered T2 hyperintensities in the white matter of the frontal lobes most likely from chronic microvascular ischemia.  These may be related to migraine type syndromes.  Clinical correlation suggested.  2. No acute intracranial processes.      Electronically signed by: Natalio Frank MD  Date:    06/20/2018  Time:    10:12   Results for orders placed or performed during the hospital encounter of 12/06/16   MRI Brain W WO Contrast    Narrative    MRI OF THE BRAIN WITHOUT AND WITH IV CONTRAST:     Technique: Multiplanar, multisequence MR images of the brain were obtained before and after the administration of 7 cc gadolinium-based IV contrast. 3-D time-of-flight MRA images of the brain were also obtained.    Comparison: MRI brain 4/29/2014       Findings:    MRI brain:  There is no restricted diffusion to suggest acute infarction. There is redemonstration of multiple foci of T2/FLAIR signal hyperintensity within the supratentorial white matter, the overall extent and distribution of which appear stable from prior MRI examination of 4/29/2014. There is no corresponding restricted diffusion or post contrast enhancement within these regions of flair signal hyperintensity. Findings likely represent sequela of chronic microvascular ischemic change. There is no evidence of acute intracranial hemorrhage, hydrocephalus, midline shift or mass effect. No pathologic foci of enhancement are identified on postcontrast images.    The craniocervical junction and sellar region are unremarkable. Major skull base base T2 flow-voids are present. Visualized globes and orbits as well as the paranasal sinuses  are unremarkable.    MRA brain:  The distal cervical, petrous, cavernous and supraclinoid segments of the ICAs are patent bilaterally. The ACAs and MCAs are patent.    The distal right vertebral artery is dominant. The left vertebral artery is hypoplastic, essentially ending in PICA. The basilar artery is patent. 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 There is a small focal outpouching which appears to be contiguous with the left superior cerebellar artery, likely representing small infundibulum. There is slight narrowing of the pre-pontine segment of the left superior cerebellar artery.    Impression    1. No evidence of acute intracranial abnormality, specifically no evidence of acute infarct.    2. Relatively stable appearing foci of T2/FLAIR signal hyperintensity in the supratentorial white matter, which is nonspecific but likely represent sequela of chronic microvascular ischemic change.    3. Diminutive caliber of the basilar artery, likely secondary to essentially persistent bilateral fetal configuration of the PCAs. Small left superior cerebellar artery infundibulum. Nonspecific slight narrowing of the left pre-pontine segment of the left superior cerebellar artery.        Electronically signed by: BRENDAN BARRY  Date:     12/07/16  Time:    05:26        Assessment/Plan:     Problem List Items Addressed This Visit        ENT    Vestibular disorder, unspecified laterality - Primary    Overview     Multiple ENT visits yield no specific treatable entity, dizziness not strictly peripheral, question supratentorial              Other    Imbalance    Overview     MRI/MRA, EEG, Carotids negative during workup so far in 2016, and again in 2018  New symptoms of vague symptoms feeling out of body and having no motor control for 8-10 minutes at a time, without chronic neurologic deficits, suggests  imbalance    Same symptoms occurred in 2020 at [facility]    Will refer to Epilepsy and Psychiatry           Other Visit Diagnoses     Seizure        Relevant Orders    Ambulatory referral/consult to Neurology        64-year-old female presents for evaluation of unexplained dizziness and loss of consciousness with bowel incontinence.  At this time I will refer her to 1 of my colleagues in [place] who is an epilepsy specialist for further evaluation of the possibility of organic seizures.  I recommended she continue working with her psychiatrist and have an honest and open dialogue about the adverse effect of the medication that she has.  She may be a possible candidate for some of the novel therapies that have been FDA approved in the last year or 2.  I have also recommended that she continue working with her psychotherapist as well as based on the tearful spells that she has in her office today she has significant unresolved anger and anxiety issues.  She may see me back on an as-needed basis.    The patient verbalizes understanding and agreement with the treatment plan. I have discussed risks, benefits and alternatives to the treatment plan. Questions were sought and answered to her stated verbal satisfaction.        Dillon West MD    This note is dictated on M*Modal Fluency Direct word recognition program. There are word recognition mistakes that are occasionally missed on review.

## 2020-08-13 ENCOUNTER — HOSPITAL ENCOUNTER (OUTPATIENT)
Facility: HOSPITAL | Age: 65
Discharge: HOME | End: 2020-08-14
Attending: EMERGENCY MEDICINE | Admitting: EMERGENCY MEDICINE
Payer: MEDICARE

## 2020-08-13 DIAGNOSIS — N30.00 ACUTE CYSTITIS WITHOUT HEMATURIA: ICD-10-CM

## 2020-08-13 DIAGNOSIS — R55 SYNCOPE: ICD-10-CM

## 2020-08-13 DIAGNOSIS — R55 SYNCOPE: Primary | ICD-10-CM

## 2020-08-13 LAB
ALBUMIN SERPL BCP-MCNC: 4.3 G/DL (ref 3.5–5.2)
ALP SERPL-CCNC: 72 U/L (ref 55–135)
ALT SERPL W/O P-5'-P-CCNC: 12 U/L (ref 10–44)
ANION GAP SERPL CALC-SCNC: 12 MMOL/L (ref 8–16)
AST SERPL-CCNC: 26 U/L (ref 10–40)
BACTERIA #/AREA URNS HPF: ABNORMAL /HPF
BASOPHILS # BLD AUTO: 0.03 K/UL (ref 0–0.2)
BASOPHILS NFR BLD: 0.2 % (ref 0–1.9)
BILIRUB SERPL-MCNC: 0.4 MG/DL (ref 0.1–1)
BILIRUB UR QL STRIP: NEGATIVE
BUN SERPL-MCNC: 23 MG/DL (ref 8–23)
CALCIUM SERPL-MCNC: 9.2 MG/DL (ref 8.7–10.5)
CHLORIDE SERPL-SCNC: 108 MMOL/L (ref 95–110)
CLARITY UR: CLEAR
CO2 SERPL-SCNC: 21 MMOL/L (ref 23–29)
COLOR UR: YELLOW
CREAT SERPL-MCNC: 1.2 MG/DL (ref 0.5–1.4)
DIFFERENTIAL METHOD: ABNORMAL
EOSINOPHIL # BLD AUTO: 0 K/UL (ref 0–0.5)
EOSINOPHIL NFR BLD: 0.1 % (ref 0–8)
ERYTHROCYTE [DISTWIDTH] IN BLOOD BY AUTOMATED COUNT: 12.2 % (ref 11.5–14.5)
EST. GFR  (AFRICAN AMERICAN): 55 ML/MIN/1.73 M^2
EST. GFR  (NON AFRICAN AMERICAN): 48 ML/MIN/1.73 M^2
GLUCOSE SERPL-MCNC: 91 MG/DL (ref 70–110)
GLUCOSE UR QL STRIP: NEGATIVE
HCT VFR BLD AUTO: 37 % (ref 37–48.5)
HGB BLD-MCNC: 12.4 G/DL (ref 12–16)
HGB UR QL STRIP: NEGATIVE
HYALINE CASTS #/AREA URNS LPF: 2 /LPF
IMM GRANULOCYTES # BLD AUTO: 0.03 K/UL (ref 0–0.04)
IMM GRANULOCYTES NFR BLD AUTO: 0.2 % (ref 0–0.5)
KETONES UR QL STRIP: NEGATIVE
LEUKOCYTE ESTERASE UR QL STRIP: ABNORMAL
LYMPHOCYTES # BLD AUTO: 0.8 K/UL (ref 1–4.8)
LYMPHOCYTES NFR BLD: 5.1 % (ref 18–48)
MAGNESIUM SERPL-MCNC: 2.1 MG/DL (ref 1.6–2.6)
MCH RBC QN AUTO: 30.1 PG (ref 27–31)
MCHC RBC AUTO-ENTMCNC: 33.5 G/DL (ref 32–36)
MCV RBC AUTO: 90 FL (ref 82–98)
MICROSCOPIC COMMENT: ABNORMAL
MONOCYTES # BLD AUTO: 0.7 K/UL (ref 0.3–1)
MONOCYTES NFR BLD: 4.8 % (ref 4–15)
NEUTROPHILS # BLD AUTO: 13.2 K/UL (ref 1.8–7.7)
NEUTROPHILS NFR BLD: 89.6 % (ref 38–73)
NITRITE UR QL STRIP: NEGATIVE
NRBC BLD-RTO: 0 /100 WBC
PH UR STRIP: 5 [PH] (ref 5–8)
PLATELET # BLD AUTO: 119 K/UL (ref 150–350)
PMV BLD AUTO: 12.8 FL (ref 9.2–12.9)
POTASSIUM SERPL-SCNC: 3.7 MMOL/L (ref 3.5–5.1)
PROT SERPL-MCNC: 7.3 G/DL (ref 6–8.4)
PROT UR QL STRIP: NEGATIVE
RBC # BLD AUTO: 4.12 M/UL (ref 4–5.4)
RBC #/AREA URNS HPF: 1 /HPF (ref 0–4)
SARS-COV-2 RDRP RESP QL NAA+PROBE: NEGATIVE
SODIUM SERPL-SCNC: 141 MMOL/L (ref 136–145)
SP GR UR STRIP: 1.02 (ref 1–1.03)
SQUAMOUS #/AREA URNS HPF: 7 /HPF
TROPONIN I SERPL DL<=0.01 NG/ML-MCNC: 0.05 NG/ML (ref 0–0.03)
TROPONIN I SERPL DL<=0.01 NG/ML-MCNC: <0.006 NG/ML (ref 0–0.03)
URN SPEC COLLECT METH UR: ABNORMAL
UROBILINOGEN UR STRIP-ACNC: NEGATIVE EU/DL
WBC # BLD AUTO: 14.71 K/UL (ref 3.9–12.7)
WBC #/AREA URNS HPF: 5 /HPF (ref 0–5)

## 2020-08-13 PROCEDURE — 83735 ASSAY OF MAGNESIUM: CPT

## 2020-08-13 PROCEDURE — 96365 THER/PROPH/DIAG IV INF INIT: CPT

## 2020-08-13 PROCEDURE — 80053 COMPREHEN METABOLIC PANEL: CPT

## 2020-08-13 PROCEDURE — 63600175 PHARM REV CODE 636 W HCPCS: Performed by: EMERGENCY MEDICINE

## 2020-08-13 PROCEDURE — 80307 DRUG TEST PRSMV CHEM ANLYZR: CPT

## 2020-08-13 PROCEDURE — 99285 EMERGENCY DEPT VISIT HI MDM: CPT | Mod: 25

## 2020-08-13 PROCEDURE — 25000003 PHARM REV CODE 250: Performed by: EMERGENCY MEDICINE

## 2020-08-13 PROCEDURE — 85025 COMPLETE CBC W/AUTO DIFF WBC: CPT

## 2020-08-13 PROCEDURE — G0378 HOSPITAL OBSERVATION PER HR: HCPCS

## 2020-08-13 PROCEDURE — U0002 COVID-19 LAB TEST NON-CDC: HCPCS

## 2020-08-13 PROCEDURE — 93010 EKG 12-LEAD: ICD-10-PCS | Mod: ,,, | Performed by: INTERNAL MEDICINE

## 2020-08-13 PROCEDURE — 93010 ELECTROCARDIOGRAM REPORT: CPT | Mod: ,,, | Performed by: INTERNAL MEDICINE

## 2020-08-13 PROCEDURE — 81000 URINALYSIS NONAUTO W/SCOPE: CPT | Mod: 59

## 2020-08-13 PROCEDURE — 93005 ELECTROCARDIOGRAM TRACING: CPT

## 2020-08-13 PROCEDURE — 84484 ASSAY OF TROPONIN QUANT: CPT

## 2020-08-13 RX ORDER — LOSARTAN POTASSIUM 50 MG/1
100 TABLET ORAL DAILY
Status: DISCONTINUED | OUTPATIENT
Start: 2020-08-14 | End: 2020-08-14 | Stop reason: HOSPADM

## 2020-08-13 RX ORDER — NITROFURANTOIN 25; 75 MG/1; MG/1
100 CAPSULE ORAL 2 TIMES DAILY
Qty: 10 CAPSULE | Refills: 0 | Status: SHIPPED | OUTPATIENT
Start: 2020-08-13 | End: 2020-08-14 | Stop reason: SDUPTHER

## 2020-08-13 RX ORDER — BACLOFEN 10 MG/1
20 TABLET ORAL 2 TIMES DAILY
Status: DISCONTINUED | OUTPATIENT
Start: 2020-08-13 | End: 2020-08-14 | Stop reason: HOSPADM

## 2020-08-13 RX ORDER — SODIUM CHLORIDE 0.9 % (FLUSH) 0.9 %
10 SYRINGE (ML) INJECTION
Status: DISCONTINUED | OUTPATIENT
Start: 2020-08-13 | End: 2020-08-14 | Stop reason: HOSPADM

## 2020-08-13 RX ADMIN — CEFTRIAXONE 1 G: 1 INJECTION, SOLUTION INTRAVENOUS at 03:08

## 2020-08-13 RX ADMIN — BACLOFEN 20 MG: 10 TABLET ORAL at 10:08

## 2020-08-13 NOTE — ED NOTES
Received report from Stefani Montoya RN.  Awaiting rapid Covid test before transferring to CDU bed 25

## 2020-08-13 NOTE — ED NOTES
Pt ambulatory to the restroom with standby assist.  Reports she does not feel as dizzy as she did prior to arrival to ED

## 2020-08-13 NOTE — ED NOTES
APPEARANCE: Alert, oriented and in no acute distress.  CARDIAC: Normal rate and rhythm, no murmur heard.   PERIPHERAL VASCULAR: peripheral pulses present. Normal cap refill. No edema. Warm to touch.    RESPIRATORY:Normal rate and effort, breath sounds clear bilaterally throughout chest. Respirations are equal and unlabored no obvious signs of distress.  GASTRO: soft, bowel sounds normal, no tenderness, no abdominal distention.  MUSC: Full ROM. No bony tenderness or soft tissue tenderness. No obvious deformity.  SKIN: Skin is warm and dry, normal skin turgor, mucous membranes moist.  NEURO: 5/5 strength major flexors/extensors bilaterally. Sensory intact to light touch bilaterally. [place] coma scale: eyes open spontaneously-4, oriented & converses-5, obeys commands-6. No neurological abnormalities. Reports dizziness.   MENTAL STATUS: awake, alert and aware of environment.  EYE: PERRL, both eyes: pupils brisk and reactive to light. Normal size.  ENT: EARS: no obvious drainage. NOSE: no active bleeding.   Pt reports dizziness and possible syncope today.

## 2020-08-13 NOTE — ED PROVIDER NOTES
Encounter Date: 8/13/2020    SCRIBE #1 NOTE: I, Aleksander Coats, am scribing for, and in the presence of, Brijesh Rivera MD.     I, Dr. Brijesh Rivera MD, personally performed the services described in this documentation. All medical record entries made by the scribe were at my direction and in my presence.  I have reviewed the chart and agree that the record reflects my personal performance and is accurate and complete. Brijesh Rivera MD.    History     Chief Complaint   Patient presents with    Loss of Consciousness     Patient states that she began to feel dizzy and lightheaded and possibly passed out vs. seizure. States she has seen neurology in past for possible seizure but has never been medicated. States over past few days she has felt generally weak. No fever, no URI symptoms.      CHIEF COMPLAINT: LOC    HISTORY OF PRESENT ILLNESS: Mary Choi who is a 64 y.o. presents to the emergency department today via EMS with complaint of syncope while shopping prior to arrival. Patient recalls feeling a feeling deep inside her prior to passing out. She describes it as a deep feeling of pressure.  She is unsure if she had any seizure activity. She denies any preceeding or current room spinning, dizziness, chest pain, SOB, N/V/D, or bowel/badder incontinence. Patient reports prior history of syncope in June 2020. She denies history of CHF. States she eats well and drinks plenty of fluids. She has an appointment scheduled with Neurology sometime in the upcomiing future for possible seizure  Patient also reports some intermittent throat scratchiness that is currently resolved. She denies other concerns at this time.    ALLERGIES REVIEWED  MEDICATIONS REVIEWED  PMH/PSH/SOC/FH REVIEWED     Nursing/Ancillary staff note reviewed.      The history is provided by the patient.     Review of patient's allergies indicates:   Allergen Reactions    Ativan  [lorazepam]      Other reaction(s): Swelling    Duloxetine Nausea And  Vomiting     Other reaction(s): Nausea     Past Medical History:   Diagnosis Date    Acid reflux     Anxiety     Arthritis     Cataract     Depression     Dry eyes     Dry mouth     Essential hypertension 2017    Rotator cuff tendinitis 4/3/2014    Thyroid disease     Ulcer      Past Surgical History:   Procedure Laterality Date    CHOLECYSTECTOMY      FRACTURE SURGERY      right ankle     INJECTION OF ANESTHETIC AGENT AROUND MEDIAL BRANCH NERVES INNERVATING CERVICAL FACET JOINT Bilateral 2019    Procedure: Cervical Medial Branch Block, Bilateral, C3,4,5;  Surgeon: Ruben Espinoza Jr., MD;  [facility];  Service: Pain Management;  Laterality: Bilateral;  Pt instructed to hold ASA x 3 days prior to procedure.      SMALL INTESTINE SURGERY      sub total gastiectomy     Family History   Problem Relation Age of Onset    Osteoarthritis Mother     Depression Mother     Lupus Sister         sister is estranged from family    No Known Problems Father     Rheum arthritis Neg Hx     Psoriasis Neg Hx     Inflammatory bowel disease Neg Hx     Melanoma Neg Hx      Social History     Tobacco Use    Smoking status: Former Smoker     Quit date: 10/18/2005     Years since quittin.8    Smokeless tobacco: Never Used    Tobacco comment: Disability due to depression and other issues;     Substance Use Topics    Alcohol use: Yes     Comment: Social, rarely    Drug use: No     Comment: never     Review of Systems   Constitutional: Negative for activity change, appetite change, chills, diaphoresis and fever.   HENT: Positive for sore throat (currently resolved). Negative for congestion, drooling, ear pain, mouth sores, rhinorrhea, sinus pain and trouble swallowing.    Eyes: Negative for pain and discharge.   Respiratory: Negative for cough, chest tightness, shortness of breath, wheezing and stridor.    Cardiovascular: Negative for chest pain, palpitations and leg swelling.    Gastrointestinal: Negative for abdominal distention, abdominal pain, blood in stool, constipation, diarrhea, nausea and vomiting.   Genitourinary: Negative for difficulty urinating, dysuria, flank pain, frequency, hematuria and urgency.   Musculoskeletal: Negative for arthralgias, back pain and myalgias.   Skin: Negative for pallor, rash and wound.   Neurological: Positive for seizures (questionable), syncope (questionable) and light-headedness. Negative for dizziness, weakness and numbness.   All other systems reviewed and are negative.      Physical Exam     Initial Vitals [08/13/20 1210]   BP Pulse Resp Temp SpO2   (!) 125/55 102 17 98.6 °F (37 °C) 95 %      MAP       --         Physical Exam    Nursing note and vitals reviewed.  Constitutional: She appears well-developed and well-nourished.   HENT:   Head: Normocephalic and atraumatic.   Right Ear: External ear normal.   Left Ear: External ear normal.   Nose: Nose normal.   Mouth/Throat: Oropharynx is clear and moist.   Eyes: Conjunctivae and EOM are normal. Pupils are equal, round, and reactive to light. No scleral icterus.   Neck: Normal range of motion. Neck supple. No JVD present.   Cardiovascular: Normal rate, regular rhythm, normal heart sounds and intact distal pulses. Exam reveals no gallop and no friction rub.    No murmur heard.  Pulmonary/Chest: Breath sounds normal. No stridor. No respiratory distress. She has no wheezes. She exhibits no tenderness.   Abdominal: Soft. Bowel sounds are normal. She exhibits no distension and no mass. There is no abdominal tenderness. There is no rebound and no guarding.   Musculoskeletal: Normal range of motion. No tenderness or edema.      Comments: Back is nontender to palpation.    Neurological: She is alert and oriented to person, place, and time. She has normal strength. No cranial nerve deficit.   Skin: Skin is warm and dry. Capillary refill takes less than 2 seconds. No rash noted. No pallor.   Psychiatric:  She has a normal mood and affect. Thought content normal.         ED Course   Procedures  Labs Reviewed   CBC W/ AUTO DIFFERENTIAL - Abnormal; Notable for the following components:       Result Value    WBC 14.71 (*)     Platelets 119 (*)     Gran # (ANC) 13.2 (*)     Lymph # 0.8 (*)     Gran% 89.6 (*)     Lymph% 5.1 (*)     All other components within normal limits   COMPREHENSIVE METABOLIC PANEL - Abnormal; Notable for the following components:    CO2 21 (*)     eGFR if  55 (*)     eGFR if non  48 (*)     All other components within normal limits   URINALYSIS, REFLEX TO URINE CULTURE - Abnormal; Notable for the following components:    Leukocytes, UA 1+ (*)     All other components within normal limits    Narrative:     Specimen Source->Urine   URINALYSIS MICROSCOPIC - Abnormal; Notable for the following components:    Bacteria Few (*)     Hyaline Casts, UA 2 (*)     All other components within normal limits    Narrative:     Specimen Source->Urine   TROPONIN I - Abnormal; Notable for the following components:    Troponin I 0.052 (*)     All other components within normal limits   CBC W/ AUTO DIFFERENTIAL - Abnormal; Notable for the following components:    RBC 3.89 (*)     Hemoglobin 11.6 (*)     Hematocrit 34.4 (*)     Platelets 101 (*)     All other components within normal limits   BASIC METABOLIC PANEL - Abnormal; Notable for the following components:    Chloride 111 (*)     CO2 22 (*)     All other components within normal limits   TROPONIN I   MAGNESIUM   DRUG SCREEN PANEL, URINE EMERGENCY   DRUG SCREEN PANEL, URINE EMERGENCY    Narrative:     Specimen Source->Urine   SARS-COV-2 RNA AMPLIFICATION, QUAL   TROPONIN I        ECG Results          EKG 12-lead (Final result)  Result time 08/13/20 16:22:45    Final result by Interface, Lab In [facility] (08/13/20 16:22:45)                 Narrative:    Test Reason : R55,    Vent. Rate : 080 BPM     Atrial Rate : 080 BPM     P-R Int :  156 ms          QRS Dur : 074 ms      QT Int : 370 ms       P-R-T Axes : 059 035 038 degrees     QTc Int : 426 ms    Normal sinus rhythm  Septal infarct ,age undetermined  Abnormal ECG  When compared with ECG of 21-AUG-2007 08:18,  Septal infarct is now Present  Confirmed by Joseph MCLAUGHLIN MD, Cory CALDERON ([id number]) on 8/13/2020 4:22:31 PM    Referred By: [email or web address]   SELF           Confirmed By:Cory Ruiz III, MD                            Imaging Results          US Carotid Bilateral (Final result)  Result time 08/13/20 17:16:31    Final result by Alvin Dodge MD (08/13/20 17:16:31)                 Impression:      Likely 50-69% stenosis at the bilateral carotid bifurcations.      Electronically signed by: Alvin Dodge MD  Date:    08/13/2020  Time:    17:16             Narrative:    EXAMINATION:  US CAROTID BILATERAL    CLINICAL HISTORY:  Syncope and collapse    TECHNIQUE:  Grayscale and color Doppler ultrasound examination of the carotid and vertebral artery systems bilaterally.  Stenosis estimates are per the NASCET measurement criteria.    COMPARISON:  Carotid ultrasound 05/10/2017    FINDINGS:  Right:    Internal Carotid Artery (ICA) peak systolic velocity 143 cm/sec    ICA/CCA peak systolic velocity ratio: 1.27    Plaque formation: Homogeneous    Vertebral artery: Antegrade flow and normal waveform.    Left: Tortuous left ICA.    Internal Carotid Artery (ICA)  peak systolic velocity 188 cm/sec    ICA/CCA peak systolic velocity ratio: 1.73    Plaque formation: Homogeneous    Vertebral artery: Antegrade flow and normal waveform.                               CT Head Without Contrast (Final result)  Result time 08/13/20 14:53:18    Final result by Ben Fuentes MD (08/13/20 14:53:18)                 Impression:      No acute intracranial findings.      Electronically signed by: Ben Fuentes  Date:    08/13/2020  Time:    14:53             Narrative:    EXAMINATION:  CT HEAD WITHOUT CONTRAST    CLINICAL  HISTORY:  Syncope, recurrent;    TECHNIQUE:  Low dose axial images were obtained through the head.  Coronal and sagittal reformations were also performed. Contrast was not administered.    COMPARISON:  Head CT performed 06/08/2020    FINDINGS:  No acute intracranial hemorrhage, mass effect, or midline shift.  No evidence of acute transcortical infarct by noncontrast CT.  Will lacunar infarct right caudate.  No hydrocephalus.  Basal cisterns remain patent.  No extra-axial fluid collection appreciated.    No aggressive osseous lesion is seen.  No depressed fracture.  Mastoid air cells and visualized paranasal sinuses appear clear.  Extracranial soft tissue structures appear unremarkable.                                 Medical Decision Making:   History:   Old Medical Records: I decided to obtain old medical records.  Old Records Summarized: other records.       <> Summary of Records: Seen in June for seizure vs syncopal episode.   Initial Assessment:   Mary Choi presents to the ED today after a syncopal episode. They did not have chest pain. They do not have a h/o CHF. They had questionable prodrome. Will obtain labs, EKG, CXR, monitor and reassess.     Differential Diagnosis:   Arrhythmia, pacemaker dysfunction, obstructive cardiomyopathy, structural disease, aortic dissection, PE, pulmonary hypertension, carotid sinus syndrome, situational, vasovagal, cerebrovascular, neurogenic, psychogenic, drug-induced, autonomic failure, dehydration.   Clinical Tests:   Lab Tests: Reviewed  Radiological Study: Reviewed  Medical Tests: Reviewed  ED Management:  This is a 63 yo female who presents to the ED with a syncopal episode. Pt is very worried and concerned. She wants to know why this is her 2nd episode of having syncopal episode - her workup in the ED was unremarkable. The pt is more comfortable with observation for further workup as she has called her insurance and they will cover the admission. Given her repeat  nature will admit to the EDOU for cardiac monitoring, serial cardiac enzymes, echocardiogram, carotid ultrasound and cardiology consult. The pt is happy with admission.   Other:   I have discussed this case with another health care provider.       <> Summary of the Discussion: I discussed the pts presentation and workup with Dr Sosa with cardiology, they will follow along.                    ED Course as of Aug 14 1205   Thu Aug 13, 2020   1516 Will place on ABX.    Leukocytes, UA(!): 1+ [JA]      ED Course User Index  [JA] Brijesh Rivera MD                Clinical Impression:       ICD-10-CM ICD-9-CM   1. Syncope  R55 780.2   2. Acute cystitis without hematuria  N30.00 595.0   3. Syncope  R55 780.2               ED Disposition Condition    Observation                           Brijesh Rivera MD  08/14/20 1211

## 2020-08-14 ENCOUNTER — TELEPHONE (OUTPATIENT)
Dept: CARDIOLOGY | Facility: CLINIC | Age: 65
End: 2020-08-14

## 2020-08-14 VITALS
RESPIRATION RATE: 20 BRPM | WEIGHT: 148 LBS | HEIGHT: 61 IN | TEMPERATURE: 98 F | DIASTOLIC BLOOD PRESSURE: 82 MMHG | SYSTOLIC BLOOD PRESSURE: 134 MMHG | BODY MASS INDEX: 27.94 KG/M2 | OXYGEN SATURATION: 99 % | HEART RATE: 68 BPM

## 2020-08-14 DIAGNOSIS — R55 SYNCOPE, UNSPECIFIED SYNCOPE TYPE: Primary | ICD-10-CM

## 2020-08-14 LAB
AMPHET+METHAMPHET UR QL: NEGATIVE
ANION GAP SERPL CALC-SCNC: 8 MMOL/L (ref 8–16)
AORTIC ROOT ANNULUS: 2.57 CM
ASCENDING AORTA: 2.54 CM
AV INDEX (PROSTH): 0.84
AV MEAN GRADIENT: 4 MMHG
AV PEAK GRADIENT: 6 MMHG
AV VALVE AREA: 2.47 CM2
AV VELOCITY RATIO: 0.82
BARBITURATES UR QL SCN>200 NG/ML: NEGATIVE
BASOPHILS # BLD AUTO: 0.02 K/UL (ref 0–0.2)
BASOPHILS NFR BLD: 0.3 % (ref 0–1.9)
BENZODIAZ UR QL SCN>200 NG/ML: NEGATIVE
BSA FOR ECHO PROCEDURE: 1.7 M2
BUN SERPL-MCNC: 17 MG/DL (ref 8–23)
BZE UR QL SCN: NEGATIVE
CALCIUM SERPL-MCNC: 8.7 MG/DL (ref 8.7–10.5)
CANNABINOIDS UR QL SCN: NEGATIVE
CHLORIDE SERPL-SCNC: 111 MMOL/L (ref 95–110)
CO2 SERPL-SCNC: 22 MMOL/L (ref 23–29)
CREAT SERPL-MCNC: 1 MG/DL (ref 0.5–1.4)
CREAT UR-MCNC: 105.4 MG/DL (ref 15–325)
CV ECHO LV RWT: 0.39 CM
DIFFERENTIAL METHOD: ABNORMAL
DOP CALC AO PEAK VEL: 1.21 M/S
DOP CALC AO VTI: 30.01 CM
DOP CALC LVOT AREA: 3 CM2
DOP CALC LVOT DIAMETER: 1.94 CM
DOP CALC LVOT PEAK VEL: 0.99 M/S
DOP CALC LVOT STROKE VOLUME: 74.27 CM3
DOP CALCLVOT PEAK VEL VTI: 25.14 CM
E WAVE DECELERATION TIME: 214.21 MSEC
E/A RATIO: 0.87
E/E' RATIO: 9.88 M/S
ECHO LV POSTERIOR WALL: 0.81 CM (ref 0.6–1.1)
EOSINOPHIL # BLD AUTO: 0.1 K/UL (ref 0–0.5)
EOSINOPHIL NFR BLD: 0.8 % (ref 0–8)
ERYTHROCYTE [DISTWIDTH] IN BLOOD BY AUTOMATED COUNT: 12.2 % (ref 11.5–14.5)
EST. GFR  (AFRICAN AMERICAN): >60 ML/MIN/1.73 M^2
EST. GFR  (NON AFRICAN AMERICAN): 60 ML/MIN/1.73 M^2
FRACTIONAL SHORTENING: 44 % (ref 28–44)
GLUCOSE SERPL-MCNC: 110 MG/DL (ref 70–110)
HCT VFR BLD AUTO: 34.4 % (ref 37–48.5)
HGB BLD-MCNC: 11.6 G/DL (ref 12–16)
IMM GRANULOCYTES # BLD AUTO: 0.01 K/UL (ref 0–0.04)
IMM GRANULOCYTES NFR BLD AUTO: 0.2 % (ref 0–0.5)
INTERVENTRICULAR SEPTUM: 0.61 CM (ref 0.6–1.1)
LA MAJOR: 4.1 CM
LA MINOR: 4.16 CM
LA WIDTH: 3.47 CM
LEFT ATRIUM SIZE: 2.68 CM
LEFT ATRIUM VOLUME INDEX: 19.6 ML/M2
LEFT ATRIUM VOLUME: 32.64 CM3
LEFT INTERNAL DIMENSION IN SYSTOLE: 2.32 CM (ref 2.1–4)
LEFT VENTRICLE DIASTOLIC VOLUME INDEX: 45.36 ML/M2
LEFT VENTRICLE DIASTOLIC VOLUME: 75.4 ML
LEFT VENTRICLE MASS INDEX: 51 G/M2
LEFT VENTRICLE SYSTOLIC VOLUME INDEX: 11.2 ML/M2
LEFT VENTRICLE SYSTOLIC VOLUME: 18.54 ML
LEFT VENTRICULAR INTERNAL DIMENSION IN DIASTOLE: 4.13 CM (ref 3.5–6)
LEFT VENTRICULAR MASS: 84.22 G
LV LATERAL E/E' RATIO: 8.78 M/S
LV SEPTAL E/E' RATIO: 11.29 M/S
LYMPHOCYTES # BLD AUTO: 1.5 K/UL (ref 1–4.8)
LYMPHOCYTES NFR BLD: 23.7 % (ref 18–48)
MCH RBC QN AUTO: 29.8 PG (ref 27–31)
MCHC RBC AUTO-ENTMCNC: 33.7 G/DL (ref 32–36)
MCV RBC AUTO: 88 FL (ref 82–98)
METHADONE UR QL SCN>300 NG/ML: NEGATIVE
MONOCYTES # BLD AUTO: 0.6 K/UL (ref 0.3–1)
MONOCYTES NFR BLD: 10.2 % (ref 4–15)
MV PEAK A VEL: 0.91 M/S
MV PEAK E VEL: 0.79 M/S
MV STENOSIS PRESSURE HALF TIME: 62.12 MS
MV VALVE AREA P 1/2 METHOD: 3.54 CM2
NEUTROPHILS # BLD AUTO: 4.1 K/UL (ref 1.8–7.7)
NEUTROPHILS NFR BLD: 64.8 % (ref 38–73)
NRBC BLD-RTO: 0 /100 WBC
OPIATES UR QL SCN: NEGATIVE
PCP UR QL SCN>25 NG/ML: NEGATIVE
PISA TR MAX VEL: 1.45 M/S
PLATELET # BLD AUTO: 101 K/UL (ref 150–350)
PMV BLD AUTO: 12.4 FL (ref 9.2–12.9)
POTASSIUM SERPL-SCNC: 3.6 MMOL/L (ref 3.5–5.1)
RA MAJOR: 4.21 CM
RA PRESSURE: 3 MMHG
RA WIDTH: 2.37 CM
RBC # BLD AUTO: 3.89 M/UL (ref 4–5.4)
SODIUM SERPL-SCNC: 141 MMOL/L (ref 136–145)
STJ: 2.62 CM
TDI LATERAL: 0.09 M/S
TDI SEPTAL: 0.07 M/S
TDI: 0.08 M/S
TOXICOLOGY INFORMATION: NORMAL
TR MAX PG: 8 MMHG
TRICUSPID ANNULAR PLANE SYSTOLIC EXCURSION: 2.22 CM
TROPONIN I SERPL DL<=0.01 NG/ML-MCNC: 0.02 NG/ML (ref 0–0.03)
TV REST PULMONARY ARTERY PRESSURE: 11 MMHG
WBC # BLD AUTO: 6.29 K/UL (ref 3.9–12.7)

## 2020-08-14 PROCEDURE — G0378 HOSPITAL OBSERVATION PER HR: HCPCS

## 2020-08-14 PROCEDURE — 80048 BASIC METABOLIC PNL TOTAL CA: CPT

## 2020-08-14 PROCEDURE — 99219 PR INITIAL OBSERVATION CARE,LEVL II: CPT | Mod: GC,,, | Performed by: INTERNAL MEDICINE

## 2020-08-14 PROCEDURE — 25000003 PHARM REV CODE 250: Performed by: EMERGENCY MEDICINE

## 2020-08-14 PROCEDURE — 99219 PR INITIAL OBSERVATION CARE,LEVL II: ICD-10-PCS | Mod: GC,,, | Performed by: INTERNAL MEDICINE

## 2020-08-14 PROCEDURE — 84484 ASSAY OF TROPONIN QUANT: CPT

## 2020-08-14 PROCEDURE — 85025 COMPLETE CBC W/AUTO DIFF WBC: CPT

## 2020-08-14 RX ORDER — NITROFURANTOIN 25; 75 MG/1; MG/1
100 CAPSULE ORAL 2 TIMES DAILY
Qty: 10 CAPSULE | Refills: 0 | Status: SHIPPED | OUTPATIENT
Start: 2020-08-14 | End: 2020-08-19

## 2020-08-14 RX ADMIN — BACLOFEN 20 MG: 10 TABLET ORAL at 09:08

## 2020-08-14 RX ADMIN — LEVOTHYROXINE SODIUM 125 MCG: 25 TABLET ORAL at 06:08

## 2020-08-14 RX ADMIN — LOSARTAN POTASSIUM 100 MG: 50 TABLET ORAL at 09:08

## 2020-08-14 NOTE — ED NOTES
Pt resting with eyes closed resp even unlabored. SR up Bed locked and low CB in reach. Pt on CM cont pulse ox and automatic BP cuff.

## 2020-08-14 NOTE — ASSESSMENT & PLAN NOTE
- presented with syncopal episode preceeded by dizziness  - symptoms seem consistent with vasovagal etiology  - orthostatics negative; EKG with no acute STTWC changes; troponin <.[phone]- mild elevation likely related to elevated BP in the 190s-200s while in the ER  - agree with echocardiogram for further evaluation; if echocardiogram normal then suitable for discharge from cardiac standpoint with outpatient follow up; if echocardiogram abnormal then will re evaluate to determine if further cardiac workup needed

## 2020-08-14 NOTE — ED NOTES
Pt awake for lab draw, no complaints voice. Pt on CM cont pulse ox and automatic BP cuff. SR up Bed locked and low CB in reach.

## 2020-08-14 NOTE — TELEPHONE ENCOUNTER
Called the pt in regards to the 48 santos mon   The pt did not answer but I left a detailed voice message as well as a call back number  Stated the appointment for the 48 Holter Monitor is on Tuesday August 18, 2020 for 9:30 am.    Provided a call back number    ND

## 2020-08-14 NOTE — ASSESSMENT & PLAN NOTE
- BP 190s-200s initially  - trended down overnight with SBP 100s-120s  - on Losartan 100mg po daily at home; will continue

## 2020-08-14 NOTE — PLAN OF CARE
CHIEF COMPLAINT: LOC     HISTORY OF PRESENT ILLNESS: Mary Choi who is a 64 y.o. presents to the emergency department today via EMS with complaint of syncope while shopping prior to arrival. Patient recalls feeling a feeling deep inside her prior to passing out. She describes it as a deep feeling of pressure.  She is unsure if she had any seizure activity. She denies any preceeding or current room spinning, dizziness, chest pain, SOB, N/V/D, or bowel/badder incontinence. Patient reports prior history of syncope in June 2020. She denies history of CHF. States she eats well and drinks plenty of fluids. She has an appointment scheduled with Neurology sometime in the upcomii future for possible seizure  Patient also reports some intermittent throat scratchiness that is currently resolved. She denies other concerns at this time.        08/14/20 0922   Discharge Assessment   Assessment Type Discharge Planning Assessment   Confirmed/corrected address and phone number on facesheet? Yes   Assessment information obtained from? Patient   Prior to hospitilization cognitive status: Alert/Oriented   Prior to hospitalization functional status: Independent   Current cognitive status: Alert/Oriented   Current Functional Status: Independent   Lives With alone   Able to Return to Prior Arrangements yes   Is patient able to care for self after discharge? Yes   Who are your caregiver(s) and their phone number(s)? Eddie Ledesma(wife)[phone]   Patient's perception of discharge disposition home or selfcare   Readmission Within the Last 30 Days no previous admission in last 30 days   Patient currently being followed by outpatient case management? No   Patient currently receives any other outside agency services? No   Equipment Currently Used at Home cane, straight   Do you have any problems affording any of your prescribed medications? No  (the pt receives her meds affordably at [facility] in [place])   Is the patient taking  medications as prescribed? yes   Does the patient have transportation home? Yes   Transportation Anticipated family or friend will provide   Does the patient receive services at the [facility]? No   Discharge Plan A Home   Discharge Plan B Home with family   DME Needed Upon Discharge  none   Patient/Family in Agreement with Plan yes

## 2020-08-14 NOTE — SUBJECTIVE & OBJECTIVE
Past Medical History:   Diagnosis Date    Acid reflux     Anxiety     Arthritis     Cataract     Depression     Dry eyes     Dry mouth     Essential hypertension 5/8/2017    Rotator cuff tendinitis 4/3/2014    Thyroid disease     Ulcer        Past Surgical History:   Procedure Laterality Date    CHOLECYSTECTOMY      FRACTURE SURGERY      right ankle     INJECTION OF ANESTHETIC AGENT AROUND MEDIAL BRANCH NERVES INNERVATING CERVICAL FACET JOINT Bilateral 4/30/2019    Procedure: Cervical Medial Branch Block, Bilateral, C3,4,5;  Surgeon: Ruben Espinoza Jr., MD;  Location: [facility];  Service: Pain Management;  Laterality: Bilateral;  Pt instructed to hold ASA x 3 days prior to procedure.      SMALL INTESTINE SURGERY  2002    sub total gastiectomy       Review of patient's allergies indicates:   Allergen Reactions    Ativan  [lorazepam]      Other reaction(s): Swelling    Duloxetine Nausea And Vomiting     Other reaction(s): Nausea       Current Facility-Administered Medications on File Prior to Encounter   Medication    sodium hyaluronate (EUFLEXXA) 10 mg/mL(mw 2.4 -3.6 million) Syrg 20 mg    sodium hyaluronate (EUFLEXXA) 10 mg/mL(mw 2.4 -3.6 million) Syrg 20 mg    sodium hyaluronate (EUFLEXXA) 10 mg/mL(mw 2.4 -3.6 million) Syrg 20 mg    sodium hyaluronate (EUFLEXXA) 10 mg/mL(mw 2.4 -3.6 million) Syrg 20 mg     Current Outpatient Medications on File Prior to Encounter   Medication Sig    ALPRAZolam (XANAX) 0.25 MG tablet Take 1/2 tab bid prn    aspirin (ECOTRIN) 81 MG EC tablet Take 81 mg by mouth Daily.    baclofen (LIORESAL) 10 MG tablet TAKE 2 TABLETS BY MOUTH TWICE DAILY    cholecalciferol, vitamin D3, 1,000 unit capsule Take 1,000 mg by mouth Daily.    diclofenac sodium 1 % Gel Apply 2 g topically 4 (four) times daily.    ESTRACE 0.01 % (0.1 mg/gram) vaginal cream Place vaginally once daily.     gabapentin (NEURONTIN) 300 MG capsule TAKE 1 CAPSULE BY MOUTH EVERY MORNING AND TAKE 2  CAPSULES BY MOUTH AT NIGHT    losartan (COZAAR) 100 MG tablet Take 100 mg by mouth once daily.     raloxifene (EVISTA) 60 mg tablet Take 60 mg by mouth once daily.    ropinirole (REQUIP) 0.5 MG tablet Take 0.5 mg by mouth Daily.    SYNTHROID 125 mcg tablet     ZOSTAVAX, PF, 19,400 unit/0.65 mL injection      Family History     Problem Relation (Age of Onset)    Depression Mother    Lupus Sister    No Known Problems Father    Osteoarthritis Mother        Tobacco Use    Smoking status: Former Smoker     Quit date: 10/18/2005     Years since quittin.8    Smokeless tobacco: Never Used    Tobacco comment: Disability due to depression and other issues;     Substance and Sexual Activity    Alcohol use: Yes     Comment: Social, rarely    Drug use: No     Comment: never    Sexual activity: Yes     Partners: Male     Review of Systems   Constitution: Negative for chills, decreased appetite, diaphoresis, fever, malaise/fatigue, weight gain and weight loss.   Cardiovascular: Positive for syncope. Negative for chest pain, claudication, cyanosis, dyspnea on exertion, irregular heartbeat, leg swelling, near-syncope, orthopnea, palpitations and paroxysmal nocturnal dyspnea.   Respiratory: Negative for cough, shortness of breath, snoring, sputum production and wheezing.    Endocrine: Negative for cold intolerance, heat intolerance, polydipsia, polyphagia and polyuria.   Skin: Negative for color change, dry skin, itching, nail changes and poor wound healing.   Musculoskeletal: Negative for back pain, gout, joint pain and joint swelling.   Gastrointestinal: Negative for bloating, abdominal pain, constipation, diarrhea, hematemesis, hematochezia, melena, nausea and vomiting.   Genitourinary: Negative for dysuria, hematuria and nocturia.   Neurological: Negative for dizziness, headaches, light-headedness, numbness, paresthesias and weakness.   Psychiatric/Behavioral: Negative for altered mental status, depression and  memory loss.     Objective:     Vital Signs (Most Recent):  Temp: 97.9 °F (36.6 °C) (08/14/20 0752)  Pulse: 77 (08/14/20 0752)  Resp: 20 (08/14/20 0752)  BP: 129/63 (08/14/20 0752)  SpO2: 99 % (08/14/20 0752) Vital Signs (24h Range):  Temp:  [97.9 °F (36.6 °C)-98.6 °F (37 °C)] 97.9 °F (36.6 °C)  Pulse:  [] 77  Resp:  [17-58] 20  SpO2:  [95 %-100 %] 99 %  BP: (101-206)/(54-91) 129/63     Weight: 67.1 kg (148 lb)  Body mass index is 27.96 kg/m².    SpO2: 99 %  O2 Device (Oxygen Therapy): room air      Intake/Output Summary (Last 24 hours) at 8/14/2020 0921  Last data filed at 8/14/2020 0045  Gross per 24 hour   Intake 225 ml   Output 300 ml   Net -75 ml       Lines/Drains/Airways     Peripheral Intravenous Line                 Peripheral IV - Single Lumen 08/13/20 1210 Anterior;Right Forearm less than 1 day         Peripheral IV - Single Lumen 08/13/20 1346 20 G Left Antecubital less than 1 day                Physical Exam   Constitutional: She is oriented to person, place, and time. She appears well-developed and well-nourished. No distress.   Neck: No JVD present.   Cardiovascular: Normal rate and regular rhythm. Exam reveals no gallop.   No murmur heard.  Pulmonary/Chest: Effort normal and breath sounds normal. No respiratory distress. She has no wheezes.   Abdominal: Soft. Bowel sounds are normal. She exhibits no distension. There is no abdominal tenderness.   Musculoskeletal:         General: No edema.   Neurological: She is alert and oriented to person, place, and time.   Skin: Skin is warm and dry.       Significant Labs:     Recent Labs   Lab 08/14/20  0133   WBC 6.29   RBC 3.89*   HGB 11.6*   HCT 34.4*   *   MCV 88   MCH 29.8   MCHC 33.7     Recent Labs   Lab 08/13/20  1346 08/14/20  0133    141   K 3.7 3.6    111*   CO2 21* 22*   BUN 23 17   CREATININE 1.2 1.0   MG 2.1  --      Recent Labs   Lab 08/14/20  0133   TROPONINI 0.016       Significant Imaging: TTE 8/14/2020 with pending  results
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
## 2020-08-14 NOTE — HOSPITAL COURSE
8/13/2020 Presented to the ER with dizziness/syncope. Initial troponin <.006 and EKG NSR with normal axis and no STTWC. Orthostatics done and negative (lying HR 93 /82 sitting HR? /91 standing HR 85 /84)   8/14/2020 Admitted to EDOU. Repeat troponin .052-.016. Carotid ultrasound with bilateral carotid stenosis with 50-69% stenosis. HR and BP stable overnight. No arrhythmias noted on telemetry. Cardiology consulted for syncope

## 2020-08-14 NOTE — DISCHARGE SUMMARY
[facility] ED Observation Unit  Discharge Summary      History of Present Illness:  Mary Choi is a 64 y.o. female who  has a past medical history of Acid reflux, Anxiety, Arthritis, Cataract, Depression, Dry eyes, Dry mouth, Essential hypertension (5/8/2017), Rotator cuff tendinitis (4/3/2014), Thyroid disease, and Ulcer.    Patient presented to ED due to syncopal episode that occurred while shopping prior to arrival. Patient recalls feeling a feeling deep inside her prior to passing out. She describes it as a deep feeling of pressure.  She is unsure if she had any seizure activity. She denies any preceeding or current room spinning, dizziness, chest pain, SOB, N/V/D, or bowel/badder incontinence. Patient reports prior history of syncope in June 2020. She denies history of CHF. States she eats well and drinks plenty of fluids. [facility]  Patient also reports some intermittent throat scratchiness that is currently resolved. She denies other concerns at this time.     Patient was placed in EDOU for further management of syncope and UTI.    Observation Course:    Medications:   Medications   baclofen tablet 20 mg (20 mg Oral Given 8/14/20 0947)   losartan tablet 100 mg (100 mg Oral Given 8/14/20 0947)   levothyroxine tablet 125 mcg (125 mcg Oral Given 8/14/20 0603)   sodium chloride 0.9% flush 10 mL (has no administration in time range)   cefTRIAXone (ROCEPHIN) 1 g/50 mL D5W IVPB (0 g Intravenous Stopped 8/13/20 1556)       Labs:   Labs Reviewed   CBC W/ AUTO DIFFERENTIAL - Abnormal; Notable for the following components:       Result Value    WBC 14.71 (*)     Platelets 119 (*)     Gran # (ANC) 13.2 (*)     Lymph # 0.8 (*)     Gran% 89.6 (*)     Lymph% 5.1 (*)     All other components within normal limits   COMPREHENSIVE METABOLIC PANEL - Abnormal; Notable for the following components:    CO2 21 (*)     eGFR if   American 55 (*)     eGFR if non  48 (*)     All other components within normal limits   URINALYSIS, REFLEX TO URINE CULTURE - Abnormal; Notable for the following components:    Leukocytes, UA 1+ (*)     All other components within normal limits    Narrative:     Specimen Source->Urine   URINALYSIS MICROSCOPIC - Abnormal; Notable for the following components:    Bacteria Few (*)     Hyaline Casts, UA 2 (*)     All other components within normal limits    Narrative:     Specimen Source->Urine   TROPONIN I - Abnormal; Notable for the following components:    Troponin I 0.052 (*)     All other components within normal limits   CBC W/ AUTO DIFFERENTIAL - Abnormal; Notable for the following components:    RBC 3.89 (*)     Hemoglobin 11.6 (*)     Hematocrit 34.4 (*)     Platelets 101 (*)     All other components within normal limits   BASIC METABOLIC PANEL - Abnormal; Notable for the following components:    Chloride 111 (*)     CO2 22 (*)     All other components within normal limits   TROPONIN I   MAGNESIUM   DRUG SCREEN PANEL, URINE EMERGENCY   SARS-COV-2 RNA AMPLIFICATION, QUAL   TROPONIN I   DRUG SCREEN PANEL, URINE EMERGENCY       Imaging:   Imaging Results          US Carotid Bilateral (Final result)  Result time 08/13/20 17:16:31    Final result by Alvin Dodge MD (08/13/20 17:16:31)                 Impression:      Likely 50-69% stenosis at the bilateral carotid bifurcations.      Electronically signed by: Alvin Dodge MD  Date:    08/13/2020  Time:    17:16             Narrative:    EXAMINATION:  US CAROTID BILATERAL    CLINICAL HISTORY:  Syncope and collapse    TECHNIQUE:  Grayscale and color Doppler ultrasound examination of the carotid and vertebral artery systems bilaterally.  Stenosis estimates are per the NASCET measurement criteria.    COMPARISON:  Carotid ultrasound 05/10/2017    FINDINGS:  Right:    Internal Carotid Artery (ICA) peak systolic velocity 143 cm/sec    ICA/CCA peak  systolic velocity ratio: 1.27    Plaque formation: Homogeneous    Vertebral artery: Antegrade flow and normal waveform.    Left: Tortuous left ICA.    Internal Carotid Artery (ICA)  peak systolic velocity 188 cm/sec    ICA/CCA peak systolic velocity ratio: 1.73    Plaque formation: Homogeneous    Vertebral artery: Antegrade flow and normal waveform.                               CT Head Without Contrast (Final result)  Result time 08/13/20 14:53:18    Final result by Ben Fuentes MD (08/13/20 14:53:18)                 Impression:      No acute intracranial findings.      Electronically signed by: Ben Fuentes  Date:    08/13/2020  Time:    14:53             Narrative:    EXAMINATION:  CT HEAD WITHOUT CONTRAST    CLINICAL HISTORY:  Syncope, recurrent;    TECHNIQUE:  Low dose axial images were obtained through the head.  Coronal and sagittal reformations were also performed. Contrast was not administered.    COMPARISON:  Head CT performed 06/08/2020    FINDINGS:  No acute intracranial hemorrhage, mass effect, or midline shift.  No evidence of acute transcortical infarct by noncontrast CT.  Will lacunar infarct right caudate.  No hydrocephalus.  Basal cisterns remain patent.  No extra-axial fluid collection appreciated.    No aggressive osseous lesion is seen.  No depressed fracture.  Mastoid air cells and visualized paranasal sinuses appear clear.  Extracranial soft tissue structures appear unremarkable.                                Consultants:    Cardiology:  Syncope  - presented with syncopal episode preceeded by dizziness  - symptoms seem consistent with vasovagal etiology  - orthostatics negative; EKG with no acute STTWC changes; troponin <.006-.052-.016- mild elevation likely related to elevated BP in the 190s-200s while in the ER  - agree with echocardiogram for further evaluation; if echocardiogram normal then suitable for discharge from cardiac standpoint with outpatient follow up; if echocardiogram  abnormal then will re evaluate to determine if further cardiac workup needed     Essential hypertension  - BP 190s-200s initially  - trended down overnight with SBP 100s-120s  - on Losartan 100mg po daily at home; will continue       Final Diagnosis:  1. Syncope    2. Acute cystitis without hematuria    3. Syncope        Discharge Condition:   Good    Disposition:   Home or Self Care     Time spent on the discharge of the patient, including review of hospital course with the patient, reviewing discharge medications, and arranging follow-up care, was 35 minutes.    Patient was seen and examined on 08/14/2020 and determined to be suitable for discharge.    Medications:   Luann Choi   Home Medication Instructions SAMUEL:[id number]    Printed on:08/14/20 3497   Medication Information                      ALPRAZolam (XANAX) 0.25 MG tablet  Take 1/2 tab bid prn             aspirin (ECOTRIN) 81 MG EC tablet  Take 81 mg by mouth Daily.             baclofen (LIORESAL) 10 MG tablet  TAKE 2 TABLETS BY MOUTH TWICE DAILY             cholecalciferol, vitamin D3, 1,000 unit capsule  Take 1,000 mg by mouth Daily.             diclofenac sodium 1 % Gel  Apply 2 g topically 4 (four) times daily.             ESTRACE 0.01 % (0.1 mg/gram) vaginal cream  Place vaginally once daily.              gabapentin (NEURONTIN) 300 MG capsule  TAKE 1 CAPSULE BY MOUTH EVERY MORNING AND TAKE 2 CAPSULES BY MOUTH AT NIGHT             losartan (COZAAR) 100 MG tablet  Take 100 mg by mouth once daily.              nitrofurantoin, macrocrystal-monohydrate, (MACROBID) 100 MG capsule  Take 1 capsule (100 mg total) by mouth 2 (two) times daily. for 5 days             raloxifene (EVISTA) 60 mg tablet  Take 60 mg by mouth once daily.             ropinirole (REQUIP) 0.5 MG tablet  Take 0.5 mg by mouth Daily.             SYNTHROID 125 mcg tablet               ZOSTAVAX, PF, 19,400 unit/0.65 mL injection                   Current Discharge Medication List       START taking these medications    Details   nitrofurantoin, macrocrystal-monohydrate, (MACROBID) 100 MG capsule Take 1 capsule (100 mg total) by mouth 2 (two) times daily. for 5 days  Qty: 10 capsule, Refills: 0             Follow Up:  Future Appointments   Date Time Provider Department Center   8/18/2020  9:30 AM [facility], [facility] C[facility]   8/20/2020  4:30 PM Conrad Olivia Jr., MD [facility] PSYCH [facility]   11/23/2020  3:20 PM Steffen Davidson MD [facility] NEURO [facility]
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
## 2020-08-14 NOTE — ED NOTES
Recd report, assumed care at this time. Pt lying in bed AAOx4, Skin warm dry intact, BBS CTA, Abdomen soft, non-distended with BS x4 quads. Peripheral pulses palpable, no edema. Pt voices no c/o dizziness or pain at this time. Pt on CM cont pulse ox and automatic BP cuff. Will continue to monitor.

## 2020-08-14 NOTE — HPI
65yo female with syncope, antiphospholipid antibody +, myalgia/myositis, FM, HTN and paresthesia who presented to the ER with syncope. Yesterday while shopping she began to feel dizzy. She describes it as a deep feeling in the top of her head followed by an off balance feeling. She denies any chest pain, SOB, palpitations and does not think she experiences nausea or flushing but is not completely certain. She states she feels as if she is going to pass out and often cannot find anywhere to sit down.  She reports having these symptoms since 2002 and has been seen by a few neurologists with no neurological abnormalities noted. She drinks a fair amount of water daily and does not consume an excessive amount of caffeine. Prior to the pandemic, she would walk at the park with no chest pain or SOB

## 2020-08-14 NOTE — PLAN OF CARE
CHIEF COMPLAINT: LOC     HISTORY OF PRESENT ILLNESS: Mary Choi who is a 64 y.o. presents to the emergency department today via EMS with complaint of syncope while shopping prior to arrival. Patient recalls feeling a feeling deep inside her prior to passing out. She describes it as a deep feeling of pressure.  She is unsure if she had any seizure activity. She denies any preceeding or current room spinning, dizziness, chest pain, SOB, N/V/D, or bowel/badder incontinence. Patient reports prior history of syncope in June 2020. She denies history of CHF. States she eats well and drinks plenty of fluids. She has an appointment scheduled with Neurology sometime in the upcomiing future for possible seizure  Patient also reports some intermittent throat scratchiness that is currently resolved. She denies other concerns at this time.    The pt is currently in CDU. The pt lives alone at [address] [place] [address] . The pt's  Eddie Ledesma [phone] lives in [place] but they visit one another frequently. The pt's car is in the parking lot and she will drive herself home. The pt has a lot of friends who are very supportive. The pt receives a monthly disability check. The pt has no needs currently. The Sw completed the white board in the pt's room and gave her a d/c brochure. The Sw encouraged her to call if she has any questions or concerns. The Sw will continue to follow the pt throughout her transitions of care and will assist with any d./c needs.

## 2020-08-14 NOTE — CONSULTS
[facility]  Cardiology  Consult Note    Patient Name: Mary Choi  MRN: [id number]  Admission Date: 8/13/2020  Hospital Length of Stay: 0 days  Code Status: Full Code   Attending Provider: No att. providers found   Consulting Provider: APRIL Birmingham, ANKIT  Primary Care Physician: Britney Hdz MD  Principal Problem:Syncope    Patient information was obtained from patient, past medical records and ER records.     Inpatient consult to [facility]  Consult performed by: APRIL Hale, ANKIT  Consult ordered by: Brijesh Rivera MD  Reason for consult: syncope        Subjective:     Chief Complaint:  Syncope      HPI:   63yo female with syncope, antiphospholipid antibody +, myalgia/myositis, FM, HTN and paresthesia who presented to the ER with syncope. Yesterday while shopping she began to feel dizzy. She describes it as a deep feeling in the top of her head followed by an off balance feeling. She denies any chest pain, SOB, palpitations and does not think she experiences nausea or flushing but is not completely certain. She states she feels as if she is going to pass out and often cannot find anywhere to sit down.  She reports having these symptoms since 2002 and has been seen by a few neurologists with no neurological abnormalities noted. She drinks a fair amount of water daily and does not consume an excessive amount of caffeine. Prior to the pandemic, she would walk at the park with no chest pain or SOB     Hospital Course:    8/13/2020 Presented to the ER with dizziness/syncope. Initial troponin <.006 and EKG NSR with normal axis and no STTWC. Orthostatics done and negative (lying HR 93 /82 sitting HR? /91 standing HR 85 /84)   8/14/2020 Admitted to EDOU. Repeat troponin .052-.016. Carotid ultrasound with bilateral carotid stenosis with 50-69% stenosis. HR and BP stable overnight. No arrhythmias noted on telemetry. Cardiology consulted for syncope      Past Medical History:   Diagnosis Date    Acid reflux     Anxiety     Arthritis     Cataract     Depression     Dry eyes     Dry mouth     Essential hypertension 5/8/2017    Rotator cuff tendinitis 4/3/2014    Thyroid disease     Ulcer        Past Surgical History:   Procedure Laterality Date    CHOLECYSTECTOMY      FRACTURE SURGERY      right ankle     INJECTION OF ANESTHETIC AGENT AROUND MEDIAL BRANCH NERVES INNERVATING CERVICAL FACET JOINT Bilateral 4/30/2019    Procedure: Cervical Medial Branch Block, Bilateral, C3,4,5;  Surgeon: Ruben Espinoza Jr., MD;  Location: [facility];  Service: Pain Management;  Laterality: Bilateral;  Pt instructed to hold ASA x 3 days prior to procedure.      SMALL INTESTINE SURGERY  2002    sub total gastiectomy       Review of patient's allergies indicates:   Allergen Reactions    Ativan  [lorazepam]      Other reaction(s): Swelling    Duloxetine Nausea And Vomiting     Other reaction(s): Nausea       Current Facility-Administered Medications on File Prior to Encounter   Medication    sodium hyaluronate (EUFLEXXA) 10 mg/mL(mw 2.4 -3.6 million) Syrg 20 mg    sodium hyaluronate (EUFLEXXA) 10 mg/mL(mw 2.4 -3.6 million) Syrg 20 mg    sodium hyaluronate (EUFLEXXA) 10 mg/mL(mw 2.4 -3.6 million) Syrg 20 mg    sodium hyaluronate (EUFLEXXA) 10 mg/mL(mw 2.4 -3.6 million) Syrg 20 mg     Current Outpatient Medications on File Prior to Encounter   Medication Sig    ALPRAZolam (XANAX) 0.25 MG tablet Take 1/2 tab bid prn    aspirin (ECOTRIN) 81 MG EC tablet Take 81 mg by mouth Daily.    baclofen (LIORESAL) 10 MG tablet TAKE 2 TABLETS BY MOUTH TWICE DAILY    cholecalciferol, vitamin D3, 1,000 unit capsule Take 1,000 mg by mouth Daily.    diclofenac sodium 1 % Gel Apply 2 g topically 4 (four) times daily.    ESTRACE 0.01 % (0.1 mg/gram) vaginal cream Place vaginally once daily.     gabapentin (NEURONTIN) 300 MG capsule TAKE 1 CAPSULE BY MOUTH EVERY MORNING AND  TAKE 2 CAPSULES BY MOUTH AT NIGHT    losartan (COZAAR) 100 MG tablet Take 100 mg by mouth once daily.     raloxifene (EVISTA) 60 mg tablet Take 60 mg by mouth once daily.    ropinirole (REQUIP) 0.5 MG tablet Take 0.5 mg by mouth Daily.    SYNTHROID 125 mcg tablet     ZOSTAVAX, PF, 19,400 unit/0.65 mL injection      Family History     Problem Relation (Age of Onset)    Depression Mother    Lupus Sister    No Known Problems Father    Osteoarthritis Mother        Tobacco Use    Smoking status: Former Smoker     Quit date: 10/18/2005     Years since quittin.8    Smokeless tobacco: Never Used    Tobacco comment: Disability due to depression and other issues;     Substance and Sexual Activity    Alcohol use: Yes     Comment: Social, rarely    Drug use: No     Comment: never    Sexual activity: Yes     Partners: Male     Review of Systems   Constitution: Negative for chills, decreased appetite, diaphoresis, fever, malaise/fatigue, weight gain and weight loss.   Cardiovascular: Positive for syncope. Negative for chest pain, claudication, cyanosis, dyspnea on exertion, irregular heartbeat, leg swelling, near-syncope, orthopnea, palpitations and paroxysmal nocturnal dyspnea.   Respiratory: Negative for cough, shortness of breath, snoring, sputum production and wheezing.    Endocrine: Negative for cold intolerance, heat intolerance, polydipsia, polyphagia and polyuria.   Skin: Negative for color change, dry skin, itching, nail changes and poor wound healing.   Musculoskeletal: Negative for back pain, gout, joint pain and joint swelling.   Gastrointestinal: Negative for bloating, abdominal pain, constipation, diarrhea, hematemesis, hematochezia, melena, nausea and vomiting.   Genitourinary: Negative for dysuria, hematuria and nocturia.   Neurological: Negative for dizziness, headaches, light-headedness, numbness, paresthesias and weakness.   Psychiatric/Behavioral: Negative for altered mental status, depression  and memory loss.     Objective:     Vital Signs (Most Recent):  Temp: 97.9 °F (36.6 °C) (08/14/20 0752)  Pulse: 77 (08/14/20 0752)  Resp: 20 (08/14/20 0752)  BP: 129/63 (08/14/20 0752)  SpO2: 99 % (08/14/20 0752) Vital Signs (24h Range):  Temp:  [97.9 °F (36.6 °C)-98.6 °F (37 °C)] 97.9 °F (36.6 °C)  Pulse:  [] 77  Resp:  [17-58] 20  SpO2:  [95 %-100 %] 99 %  BP: (101-206)/(54-91) 129/63     Weight: 67.1 kg (148 lb)  Body mass index is 27.96 kg/m².    SpO2: 99 %  O2 Device (Oxygen Therapy): room air      Intake/Output Summary (Last 24 hours) at 8/14/2020 0921  Last data filed at 8/14/2020 0045  Gross per 24 hour   Intake 225 ml   Output 300 ml   Net -75 ml       Lines/Drains/Airways     Peripheral Intravenous Line                 Peripheral IV - Single Lumen 08/13/20 1210 Anterior;Right Forearm less than 1 day         Peripheral IV - Single Lumen 08/13/20 1346 20 G Left Antecubital less than 1 day                Physical Exam   Constitutional: She is oriented to person, place, and time. She appears well-developed and well-nourished. No distress.   Neck: No JVD present.   Cardiovascular: Normal rate and regular rhythm. Exam reveals no gallop.   No murmur heard.  Pulmonary/Chest: Effort normal and breath sounds normal. No respiratory distress. She has no wheezes.   Abdominal: Soft. Bowel sounds are normal. She exhibits no distension. There is no abdominal tenderness.   Musculoskeletal:         General: No edema.   Neurological: She is alert and oriented to person, place, and time.   Skin: Skin is warm and dry.       Significant Labs:     Recent Labs   Lab 08/14/20  0133   WBC 6.29   RBC 3.89*   HGB 11.6*   HCT 34.4*   *   MCV 88   MCH 29.8   MCHC 33.7     Recent Labs   Lab 08/13/20  1346 08/14/20  0133    141   K 3.7 3.6    111*   CO2 21* 22*   BUN 23 17   CREATININE 1.2 1.0   MG 2.1  --      Recent Labs   Lab 08/14/20  0133   TROPONINI 0.016       Significant Imaging: TTE 8/14/2020 with  pending results     Assessment and Plan:     * Syncope  - presented with syncopal episode preceeded by dizziness  - symptoms seem consistent with vasovagal etiology  - orthostatics negative; EKG with no acute STTWC changes; troponin <.006-.052-.016- mild elevation likely related to elevated BP in the 190s-200s while in the ER  - agree with echocardiogram for further evaluation; if echocardiogram normal then suitable for discharge from cardiac standpoint with outpatient follow up; if echocardiogram abnormal then will re evaluate to determine if further cardiac workup needed    Essential hypertension  - BP 190s-200s initially  - trended down overnight with SBP 100s-120s  - on Losartan 100mg po daily at home; will continue         VTE Risk Mitigation (From admission, onward)         Ordered     Place sequential compression device  Until discontinued      08/13/20 1541     IP VTE LOW RISK PATIENT  Once      08/13/20 1541                Thank you for your consult.     APRIL Birmingham, ANP  Cardiology   [facility]

## 2020-08-14 NOTE — ED NOTES
Pt resting with eyes closed resp even unlabored. Pt on CM cont  Pulse ox and automatic BP cuff. SR up Bed jacquelyn and low CB in reach.

## 2020-08-18 ENCOUNTER — HOSPITAL ENCOUNTER (OUTPATIENT)
Dept: CARDIOLOGY | Facility: HOSPITAL | Age: 65
Discharge: HOME | End: 2020-08-18
Attending: NURSE PRACTITIONER
Payer: MEDICARE

## 2020-08-18 DIAGNOSIS — R55 SYNCOPE, UNSPECIFIED SYNCOPE TYPE: ICD-10-CM

## 2020-08-18 PROCEDURE — 93227 XTRNL ECG REC<48 HR R&I: CPT | Mod: ,,, | Performed by: INTERNAL MEDICINE

## 2020-08-18 PROCEDURE — 93227 HOLTER MONITOR - 48 HOUR (CUPID ONLY): ICD-10-PCS | Mod: ,,, | Performed by: INTERNAL MEDICINE

## 2020-08-18 PROCEDURE — 93225 XTRNL ECG REC<48 HRS REC: CPT

## 2020-08-20 ENCOUNTER — OFFICE VISIT (OUTPATIENT)
Dept: PSYCHIATRY | Facility: CLINIC | Age: 65
End: 2020-08-20
Payer: COMMERCIAL

## 2020-08-20 DIAGNOSIS — F43.0 STRESS DISORDER, ACUTE: Primary | ICD-10-CM

## 2020-08-20 PROCEDURE — 99214 OFFICE O/P EST MOD 30 MIN: CPT | Mod: 95,,, | Performed by: PSYCHIATRY & NEUROLOGY

## 2020-08-20 PROCEDURE — 99214 PR OFFICE/OUTPT VISIT, EST, LEVL IV, 30-39 MIN: ICD-10-PCS | Mod: 95,,, | Performed by: PSYCHIATRY & NEUROLOGY

## 2020-08-20 NOTE — PROGRESS NOTES
"Outpatient Psychiatry Follow-Up Visit (MD/NP)    08/20/2020 Phone visit(10"). Patient cannot use the virtual system due to a deficiency in her phone.    Clinical Status of Patient:  Outpatient (Ambulatory)    Chief Complaint:  stress    Interval History and Content of Current Session:Patient continues not to do well.  She expressed her frustration re not being able to find out why she 'has lost consciousness" recently and had to be hospitalized. Despite this stress she is in good self control and has no thoughts of harming herself or others and has no signs of pradeep or psychosis. We discussed her frustration. I urged her to try and get a neurology appointment sooner than November if possible. She will also be seen by Cardiology in the near future. I advised that she stay off the Xanax completely at this time and that I would not add another psych med now until we have a clearer picture of the cause of he problems physically. Patient agreed with that plan. She is not agitated and even showed some sense of humor despite the stress. She does not have signs of a clinical depression.      Compliance:  good    Side effects:  None  Musculoskeletal: patient had no complaints of abnormal motor movements of any kind,other than the recent episodes as what she described as losing consciousness.    Risk Parameters:not active danger to self or others at this time.    Patient's Response to Intervention:accepting.    Progress Toward Goals and Other Mental Status Changes:not good at this time     Vital signs this date were not reviewed.     Mental Status Evaluation     Appearance:  not assessed   Behavior:  cooperative                             Speech normal   Mood:  anxious, dysthymic   Affect:  dysphoric   Thought Process:  linear, logical   Thought Content:  organizednormal   Sensorium:  alert and oriented to person, place, time and situation   Attention Span & Concentration able to focus   Cognition:  Grossly intactgrossly " intact   Insight:  has awareness of illness   Judgment:  behavior is adequate to circumstances       Diagnosis:    Stress disorder, acute [F43.0]      I reviewed the side effects of the patient's medicines and advised a call if the patient had problems with the medicine or clinical condition.    Plan:(Medication and Therapy Recommendation)  Additional Notes: Patient agreed to not take Xanax or psych meds for now.(has not had Xanax for a week) and to schedule next appointment in October this year.  Return to Clinic:2 months

## 2020-08-24 LAB
OHS CV EVENT MONITOR DAY: 0
OHS CV HOLTER LENGTH DECIMAL HOURS: 47.98
OHS CV HOLTER LENGTH HOURS: 47
OHS CV HOLTER LENGTH MINUTES: 59

## 2020-08-26 ENCOUNTER — TELEPHONE (OUTPATIENT)
Dept: CARDIOLOGY | Facility: CLINIC | Age: 65
End: 2020-08-26

## 2020-08-26 NOTE — TELEPHONE ENCOUNTER
----- Message from APRIL Hale, ANKIT sent at 8/25/2020  7:44 AM CDT -----  Holter monitor normal. Follow up with PCP as scheduled     Thanks  BH

## 2020-10-12 NOTE — ED NOTES
Pt resting with eyes closed resp even unlabored. SR up Bed locked and low CB in reach. Pt on CM cont pulse ox and automatic BP cuff.   Never smoker

## 2021-08-14 NOTE — TELEPHONE ENCOUNTER
Spoke with  and offered her 6/18 as the soonest appt. The pt accepted and was very grateful.   DISPLAY PLAN FREE TEXT
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
## (undated) DEVICE — DRESSING LEUKOPLAST FLEX 1X3IN